# Patient Record
Sex: MALE | Race: WHITE | NOT HISPANIC OR LATINO | URBAN - METROPOLITAN AREA
[De-identification: names, ages, dates, MRNs, and addresses within clinical notes are randomized per-mention and may not be internally consistent; named-entity substitution may affect disease eponyms.]

---

## 2019-09-30 ENCOUNTER — OUTPATIENT (OUTPATIENT)
Dept: OUTPATIENT SERVICES | Facility: HOSPITAL | Age: 69
LOS: 1 days | End: 2019-09-30
Payer: MEDICARE

## 2019-09-30 ENCOUNTER — APPOINTMENT (OUTPATIENT)
Dept: RADIOLOGY | Facility: HOSPITAL | Age: 69
End: 2019-09-30
Payer: COMMERCIAL

## 2019-09-30 DIAGNOSIS — Z00.8 ENCOUNTER FOR OTHER GENERAL EXAMINATION: ICD-10-CM

## 2019-09-30 PROCEDURE — 73562 X-RAY EXAM OF KNEE 3: CPT | Mod: 26,LT

## 2019-09-30 PROCEDURE — 73562 X-RAY EXAM OF KNEE 3: CPT

## 2021-03-24 ENCOUNTER — INPATIENT (INPATIENT)
Facility: HOSPITAL | Age: 71
LOS: 8 days | Discharge: HOME | DRG: 177 | End: 2021-04-02
Attending: FAMILY MEDICINE | Admitting: INTERNAL MEDICINE
Payer: MEDICARE

## 2021-03-24 VITALS
HEIGHT: 69 IN | OXYGEN SATURATION: 95 % | TEMPERATURE: 98 F | RESPIRATION RATE: 16 BRPM | DIASTOLIC BLOOD PRESSURE: 73 MMHG | SYSTOLIC BLOOD PRESSURE: 186 MMHG | HEART RATE: 89 BPM | WEIGHT: 315 LBS

## 2021-03-24 DIAGNOSIS — R09.02 HYPOXEMIA: ICD-10-CM

## 2021-03-24 DIAGNOSIS — Z95.0 PRESENCE OF CARDIAC PACEMAKER: Chronic | ICD-10-CM

## 2021-03-24 LAB
ALBUMIN SERPL ELPH-MCNC: 2.5 G/DL — LOW (ref 3.3–5)
ALBUMIN SERPL ELPH-MCNC: 2.5 G/DL — LOW (ref 3.3–5)
ALP SERPL-CCNC: 83 U/L — SIGNIFICANT CHANGE UP (ref 40–120)
ALP SERPL-CCNC: 83 U/L — SIGNIFICANT CHANGE UP (ref 40–120)
ALT FLD-CCNC: 75 U/L — HIGH (ref 10–45)
ALT FLD-CCNC: 75 U/L — HIGH (ref 10–45)
ANION GAP SERPL CALC-SCNC: 7 MMOL/L — SIGNIFICANT CHANGE UP (ref 5–17)
AST SERPL-CCNC: 50 U/L — HIGH (ref 10–40)
AST SERPL-CCNC: 50 U/L — HIGH (ref 10–40)
BASOPHILS # BLD AUTO: 0.01 K/UL — SIGNIFICANT CHANGE UP (ref 0–0.2)
BASOPHILS NFR BLD AUTO: 0.1 % — SIGNIFICANT CHANGE UP (ref 0–2)
BILIRUB DIRECT SERPL-MCNC: 0.1 MG/DL — SIGNIFICANT CHANGE UP (ref 0–0.2)
BILIRUB INDIRECT FLD-MCNC: 0.2 MG/DL — SIGNIFICANT CHANGE UP (ref 0.2–1)
BILIRUB SERPL-MCNC: 0.3 MG/DL — SIGNIFICANT CHANGE UP (ref 0.2–1.2)
BILIRUB SERPL-MCNC: 0.3 MG/DL — SIGNIFICANT CHANGE UP (ref 0.2–1.2)
BUN SERPL-MCNC: 19 MG/DL — SIGNIFICANT CHANGE UP (ref 7–23)
CALCIUM SERPL-MCNC: 8 MG/DL — LOW (ref 8.4–10.5)
CHLORIDE SERPL-SCNC: 101 MMOL/L — SIGNIFICANT CHANGE UP (ref 96–108)
CO2 SERPL-SCNC: 27 MMOL/L — SIGNIFICANT CHANGE UP (ref 22–31)
CREAT SERPL-MCNC: 1.08 MG/DL — SIGNIFICANT CHANGE UP (ref 0.5–1.3)
CREAT SERPL-MCNC: 1.08 MG/DL — SIGNIFICANT CHANGE UP (ref 0.5–1.3)
CRP SERPL-MCNC: 54 MG/L — HIGH
D DIMER BLD IA.RAPID-MCNC: 298 NG/ML DDU — HIGH
EOSINOPHIL # BLD AUTO: 0.12 K/UL — SIGNIFICANT CHANGE UP (ref 0–0.5)
EOSINOPHIL NFR BLD AUTO: 1.6 % — SIGNIFICANT CHANGE UP (ref 0–6)
GLUCOSE BLDC GLUCOMTR-MCNC: 147 MG/DL — HIGH (ref 70–99)
GLUCOSE BLDC GLUCOMTR-MCNC: 169 MG/DL — HIGH (ref 70–99)
GLUCOSE SERPL-MCNC: 110 MG/DL — HIGH (ref 70–99)
HCT VFR BLD CALC: 40 % — SIGNIFICANT CHANGE UP (ref 39–50)
HGB BLD-MCNC: 13.2 G/DL — SIGNIFICANT CHANGE UP (ref 13–17)
IMM GRANULOCYTES NFR BLD AUTO: 0.4 % — SIGNIFICANT CHANGE UP (ref 0–1.5)
INR BLD: 1.18 RATIO — HIGH (ref 0.88–1.16)
LYMPHOCYTES # BLD AUTO: 0.85 K/UL — LOW (ref 1–3.3)
LYMPHOCYTES # BLD AUTO: 11.1 % — LOW (ref 13–44)
MCHC RBC-ENTMCNC: 29.7 PG — SIGNIFICANT CHANGE UP (ref 27–34)
MCHC RBC-ENTMCNC: 33 GM/DL — SIGNIFICANT CHANGE UP (ref 32–36)
MCV RBC AUTO: 89.9 FL — SIGNIFICANT CHANGE UP (ref 80–100)
MONOCYTES # BLD AUTO: 0.92 K/UL — HIGH (ref 0–0.9)
MONOCYTES NFR BLD AUTO: 12 % — SIGNIFICANT CHANGE UP (ref 2–14)
NEUTROPHILS # BLD AUTO: 5.73 K/UL — SIGNIFICANT CHANGE UP (ref 1.8–7.4)
NEUTROPHILS NFR BLD AUTO: 74.8 % — SIGNIFICANT CHANGE UP (ref 43–77)
NRBC # BLD: 0 /100 WBCS — SIGNIFICANT CHANGE UP (ref 0–0)
PLATELET # BLD AUTO: 205 K/UL — SIGNIFICANT CHANGE UP (ref 150–400)
POTASSIUM SERPL-MCNC: 3.6 MMOL/L — SIGNIFICANT CHANGE UP (ref 3.5–5.3)
POTASSIUM SERPL-SCNC: 3.6 MMOL/L — SIGNIFICANT CHANGE UP (ref 3.5–5.3)
PROCALCITONIN SERPL-MCNC: 0.11 NG/ML — HIGH
PROT SERPL-MCNC: 6.6 G/DL — SIGNIFICANT CHANGE UP (ref 6–8.3)
PROT SERPL-MCNC: 6.6 G/DL — SIGNIFICANT CHANGE UP (ref 6–8.3)
PROTHROM AB SERPL-ACNC: 14.2 SEC — HIGH (ref 10.6–13.6)
RBC # BLD: 4.45 M/UL — SIGNIFICANT CHANGE UP (ref 4.2–5.8)
RBC # FLD: 13.8 % — SIGNIFICANT CHANGE UP (ref 10.3–14.5)
SARS-COV-2 RNA SPEC QL NAA+PROBE: DETECTED
SODIUM SERPL-SCNC: 135 MMOL/L — SIGNIFICANT CHANGE UP (ref 135–145)
WBC # BLD: 7.66 K/UL — SIGNIFICANT CHANGE UP (ref 3.8–10.5)
WBC # FLD AUTO: 7.66 K/UL — SIGNIFICANT CHANGE UP (ref 3.8–10.5)

## 2021-03-24 PROCEDURE — 99223 1ST HOSP IP/OBS HIGH 75: CPT | Mod: CS

## 2021-03-24 PROCEDURE — 71045 X-RAY EXAM CHEST 1 VIEW: CPT | Mod: 26

## 2021-03-24 PROCEDURE — 93010 ELECTROCARDIOGRAM REPORT: CPT

## 2021-03-24 PROCEDURE — 99285 EMERGENCY DEPT VISIT HI MDM: CPT | Mod: CS

## 2021-03-24 RX ORDER — SODIUM CHLORIDE 9 MG/ML
1000 INJECTION, SOLUTION INTRAVENOUS
Refills: 0 | Status: DISCONTINUED | OUTPATIENT
Start: 2021-03-24 | End: 2021-04-02

## 2021-03-24 RX ORDER — GLUCAGON INJECTION, SOLUTION 0.5 MG/.1ML
1 INJECTION, SOLUTION SUBCUTANEOUS ONCE
Refills: 0 | Status: DISCONTINUED | OUTPATIENT
Start: 2021-03-24 | End: 2021-04-02

## 2021-03-24 RX ORDER — ENOXAPARIN SODIUM 100 MG/ML
40 INJECTION SUBCUTANEOUS EVERY 12 HOURS
Refills: 0 | Status: DISCONTINUED | OUTPATIENT
Start: 2021-03-24 | End: 2021-03-28

## 2021-03-24 RX ORDER — CHOLECALCIFEROL (VITAMIN D3) 125 MCG
4000 CAPSULE ORAL DAILY
Refills: 0 | Status: DISCONTINUED | OUTPATIENT
Start: 2021-03-24 | End: 2021-04-02

## 2021-03-24 RX ORDER — POLYETHYLENE GLYCOL 3350 17 G/17G
17 POWDER, FOR SOLUTION ORAL DAILY
Refills: 0 | Status: DISCONTINUED | OUTPATIENT
Start: 2021-03-24 | End: 2021-04-02

## 2021-03-24 RX ORDER — AZITHROMYCIN 500 MG/1
500 TABLET, FILM COATED ORAL ONCE
Refills: 0 | Status: COMPLETED | OUTPATIENT
Start: 2021-03-24 | End: 2021-03-24

## 2021-03-24 RX ORDER — ZINC SULFATE TAB 220 MG (50 MG ZINC EQUIVALENT) 220 (50 ZN) MG
220 TAB ORAL DAILY
Refills: 0 | Status: DISCONTINUED | OUTPATIENT
Start: 2021-03-24 | End: 2021-04-02

## 2021-03-24 RX ORDER — INSULIN LISPRO 100/ML
VIAL (ML) SUBCUTANEOUS
Refills: 0 | Status: DISCONTINUED | OUTPATIENT
Start: 2021-03-24 | End: 2021-03-28

## 2021-03-24 RX ORDER — ASCORBIC ACID 60 MG
500 TABLET,CHEWABLE ORAL DAILY
Refills: 0 | Status: DISCONTINUED | OUTPATIENT
Start: 2021-03-24 | End: 2021-04-02

## 2021-03-24 RX ORDER — SENNA PLUS 8.6 MG/1
2 TABLET ORAL AT BEDTIME
Refills: 0 | Status: DISCONTINUED | OUTPATIENT
Start: 2021-03-24 | End: 2021-04-02

## 2021-03-24 RX ORDER — DEXAMETHASONE 0.5 MG/5ML
10 ELIXIR ORAL ONCE
Refills: 0 | Status: COMPLETED | OUTPATIENT
Start: 2021-03-24 | End: 2021-03-24

## 2021-03-24 RX ORDER — DEXTROSE 50 % IN WATER 50 %
12.5 SYRINGE (ML) INTRAVENOUS ONCE
Refills: 0 | Status: DISCONTINUED | OUTPATIENT
Start: 2021-03-24 | End: 2021-04-02

## 2021-03-24 RX ORDER — DEXTROSE 50 % IN WATER 50 %
25 SYRINGE (ML) INTRAVENOUS ONCE
Refills: 0 | Status: DISCONTINUED | OUTPATIENT
Start: 2021-03-24 | End: 2021-04-02

## 2021-03-24 RX ORDER — CEFTRIAXONE 500 MG/1
1000 INJECTION, POWDER, FOR SOLUTION INTRAMUSCULAR; INTRAVENOUS ONCE
Refills: 0 | Status: COMPLETED | OUTPATIENT
Start: 2021-03-24 | End: 2021-03-24

## 2021-03-24 RX ORDER — FAMOTIDINE 10 MG/ML
20 INJECTION INTRAVENOUS DAILY
Refills: 0 | Status: DISCONTINUED | OUTPATIENT
Start: 2021-03-24 | End: 2021-04-02

## 2021-03-24 RX ORDER — DEXTROSE 50 % IN WATER 50 %
15 SYRINGE (ML) INTRAVENOUS ONCE
Refills: 0 | Status: DISCONTINUED | OUTPATIENT
Start: 2021-03-24 | End: 2021-04-02

## 2021-03-24 RX ORDER — DEXAMETHASONE 0.5 MG/5ML
6 ELIXIR ORAL DAILY
Refills: 0 | Status: DISCONTINUED | OUTPATIENT
Start: 2021-03-24 | End: 2021-04-02

## 2021-03-24 RX ADMIN — Medication 1 TABLET(S): at 17:02

## 2021-03-24 RX ADMIN — SENNA PLUS 2 TABLET(S): 8.6 TABLET ORAL at 21:53

## 2021-03-24 RX ADMIN — ENOXAPARIN SODIUM 40 MILLIGRAM(S): 100 INJECTION SUBCUTANEOUS at 17:01

## 2021-03-24 RX ADMIN — Medication 4000 UNIT(S): at 17:04

## 2021-03-24 RX ADMIN — Medication 102 MILLIGRAM(S): at 12:13

## 2021-03-24 RX ADMIN — Medication 6 MILLIGRAM(S): at 17:02

## 2021-03-24 RX ADMIN — POLYETHYLENE GLYCOL 3350 17 GRAM(S): 17 POWDER, FOR SOLUTION ORAL at 17:01

## 2021-03-24 RX ADMIN — CEFTRIAXONE 1000 MILLIGRAM(S): 500 INJECTION, POWDER, FOR SOLUTION INTRAMUSCULAR; INTRAVENOUS at 12:22

## 2021-03-24 RX ADMIN — AZITHROMYCIN 255 MILLIGRAM(S): 500 TABLET, FILM COATED ORAL at 12:22

## 2021-03-24 RX ADMIN — AZITHROMYCIN 500 MILLIGRAM(S): 500 TABLET, FILM COATED ORAL at 13:20

## 2021-03-24 RX ADMIN — Medication 10 MILLIGRAM(S): at 12:22

## 2021-03-24 RX ADMIN — Medication 500 MILLIGRAM(S): at 17:02

## 2021-03-24 RX ADMIN — FAMOTIDINE 20 MILLIGRAM(S): 10 INJECTION INTRAVENOUS at 17:01

## 2021-03-24 RX ADMIN — ZINC SULFATE TAB 220 MG (50 MG ZINC EQUIVALENT) 220 MILLIGRAM(S): 220 (50 ZN) TAB at 17:01

## 2021-03-24 RX ADMIN — CEFTRIAXONE 100 MILLIGRAM(S): 500 INJECTION, POWDER, FOR SOLUTION INTRAMUSCULAR; INTRAVENOUS at 12:14

## 2021-03-24 NOTE — ED PROVIDER NOTE - ATTENDING CONTRIBUTION TO CARE
52 yr old male with no pmhx presents for covid swab s/p recent travel from [place]. Pt reports needing swab to return to work. Denies any current symptoms. No fever, chills, chest pain, cough, sob or any other symptoms.    well appearing, unremarkable exam   covid swab and stable for dc  Dr. Quinones:  I have reviewed and discussed with the PA/ resident the case specifics, including the history, physical assessment, evaluation, conclusion, laboratory results, and medical plan. I agree with the contents, and conclusions. I have personally examined, and interviewed the patient.

## 2021-03-24 NOTE — PATIENT PROFILE ADULT - NSTOBACCONEVERSMOKERY/N_GEN_A
"This worker again spoke to [facility]'s pharmacy, whose pharmacy tech stated that he had recently received two separate prescriptions of phenobarbital--  one from Dr. Sol, and one from patient's \"restricted provider\" at   [facility].   This technician stated that he would reach out to provider at [facility] to see if that prescription was still valid, or if it was to be discontinued in consideration pf Dr. Sol's discharge medication order.  Awaiting return call.  " No

## 2021-03-24 NOTE — H&P ADULT - NSHPPHYSICALEXAM_GEN_ALL_CORE
Vital Signs Last 24 Hrs  T(C): 36.9 (24 Mar 2021 10:37), Max: 36.9 (24 Mar 2021 10:37)  T(F): 98.4 (24 Mar 2021 10:37), Max: 98.4 (24 Mar 2021 10:37)  HR: 81 (24 Mar 2021 12:22) (81 - 89)  BP: 132/94 (24 Mar 2021 12:22) (132/94 - 186/73)  BP(mean): 101 (24 Mar 2021 12:22) (101 - 101)  RR: 19 (24 Mar 2021 12:22) (16 - 19)  SpO2: 97% (24 Mar 2021 12:22) (95% - 97%) Vital Signs Last 24 Hrs  T(C): 36.9 (24 Mar 2021 10:37), Max: 36.9 (24 Mar 2021 10:37)  T(F): 98.4 (24 Mar 2021 10:37), Max: 98.4 (24 Mar 2021 10:37)  HR: 81 (24 Mar 2021 12:22) (81 - 89)  BP: 132/94 (24 Mar 2021 12:22) (132/94 - 186/73)  BP(mean): 101 (24 Mar 2021 12:22) (101 - 101)  RR: 19 (24 Mar 2021 12:22) (16 - 19)  SpO2: 97% (24 Mar 2021 12:22) (95% - 97%)    GENERAL: Not in distress. Alert . morbid obesity  HEENT:  Normocephalic and atraumatic. PEARLA,EOMI  NECK: Supple.  No JVD.    CARDIOVASCULAR: RRR S1, S2. No murmur/rubs/gallop  LUNGS: BLAE+, no rales, no wheezing, no rhonchi.    ABDOMEN: ND. Soft,  NT, no guarding / rebound / rigidity. BS normoactive. No CVA tenderness.    EXTREMITIES: no cyanosis, no clubbing, no edema. no leg or calf TP  SKIN: no rash.   NEUROLOGIC: AAO*3.strength is symmetric, sensation intact, speech fluent.    PSYCHIATRIC: Calm.  No agitation.

## 2021-03-24 NOTE — ED PROVIDER NOTE - OBJECTIVE STATEMENT
70 yr old male with pacemaker, positive covid, dx 1 week ago, presents today with cough, and shortness of breath for the last 2 days, worse at night and unable to sleep. Pt reports seen by pmd last week and was given prednisone, zpack and inhaler which he has completed. Laure any current fever, chills, n/v/d. Pt reports he has been taking 02 at home which has been in mid 90's

## 2021-03-24 NOTE — H&P ADULT - HISTORY OF PRESENT ILLNESS
cough and sob , tested positive for covid on Wednesday. found to have covid pneumonia hypoxia.  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 He along with his entire family was covid positive. he tested postive 1 week ago. Pt reports seen by pmd last week and was given prednisone, zpack, tensilon catrachito and inhaler which he has completed. but did not help. His O2 sat went down to 89% yesterday then mid 90s.  In ER his VS is table. O2 sat was 95% at RA. CXR showed b/l PNA. wbc normal. procal 0.11. LFT and Dddimer elevated. covid +. One doses each: dexamethasone, Ctx and zithromax was given and medical admission was called.  patient currently reported constipation, cough and mild SOB.

## 2021-03-24 NOTE — H&P ADULT - NSHPLABSRESULTS_GEN_ALL_CORE
13.2   7.66  )-----------( 205      ( 24 Mar 2021 11:32 )             40.0       03-24    135  |  101  |  19  ----------------------------<  110<H>  3.6   |  27  |  1.08    Ca    8.0<L>      24 Mar 2021 11:32    TPro  6.6  /  Alb  2.5<L>  /  TBili  0.3  /  DBili  x   /  AST  50<H>  /  ALT  75<H>  /  AlkPhos  83  03-24    < from: Xray Chest 1 View AP/PA (03.24.21 @ 10:59) >    There is mild new scattered interstitial infiltrate consistent with pneumonia or pneumonitis without segmental consolidation, layering effusion or cavitation. The heart is normal in size. A pacemaker remains in place.    < end of copied text >

## 2021-03-24 NOTE — PATIENT PROFILE ADULT - NSPROPTRIGHTNOTIFY_GEN_A_NUR
[Clinical] : TNM Stage: c [IB] : IB [TTNM] : 1b [NTNM] : 0 [MTNM] : 0 [de-identified] : 5250cGy [de-identified] : 7330cGy [de-identified] : Cervix declines

## 2021-03-24 NOTE — ED ADULT NURSE NOTE - NS ED NOTE ABUSE SUSPICION NEGLECT YN
Problem: Adult Inpatient Plan of Care  Goal: Plan of Care Review  Outcome: Ongoing (interventions implemented as appropriate)  Pt free of fall this shift. Pain assessed and pt c/o on BLE; prn norco administered and pt verbalized moderate relief. Cbg monitored to manage pt DM2 and no insulin given this shift. Cardiac monitor in place and no abnormal rhythm noted. No s/s of infection noted. Pt aaox4. Afebrile. Vss. Pt is getting discharge.       No

## 2021-03-24 NOTE — ED PROVIDER NOTE - CLINICAL SUMMARY MEDICAL DECISION MAKING FREE TEXT BOX
70 yr old male with pacemaker, positive covid, dx 1 week ago, presents today with cough, and shortness of breath for the last 2 days, worse at night and unable to sleep. Pt reports seen by pmd last week and was given prednisone, zpack and inhaler which he has completed. Laure any current fever, chills, n/v/d. Pt reports he has been taking 02 at home which has been in mid 90's   diffuse decrease lung sounds, 02 dropping to 89 on RA, covid labs sent, plan to admit to medicine for hypoxia

## 2021-03-24 NOTE — H&P ADULT - NSHPREVIEWOFSYSTEMS_GEN_ALL_CORE
no fever/chills/CP//palpitation/dizziness/ abd pain/nausea/vomiting/diarrhea/constipation/headaches. all other ROS neg

## 2021-03-24 NOTE — ED ADULT NURSE NOTE - OBJECTIVE STATEMENT
Pt presents to ED from home for SOB and cough. Pt was diagnosed with covid last Wednesday and has felt SOB x3 days worsening with activity. Denies fever. Yesterday he finished prednisone and azithromycin treatment prescribed by his PMD.

## 2021-03-24 NOTE — ED PROVIDER NOTE - CARE PLAN
Principal Discharge DX:	Hypoxia  Secondary Diagnosis:	COVID-19   Principal Discharge DX:	Hypoxia  Secondary Diagnosis:	COVID-19  Secondary Diagnosis:	Pneumonia

## 2021-03-25 LAB
A1C WITH ESTIMATED AVERAGE GLUCOSE RESULT: 6.4 % — HIGH (ref 4–5.6)
ALBUMIN SERPL ELPH-MCNC: 2.5 G/DL — LOW (ref 3.3–5)
ALBUMIN SERPL ELPH-MCNC: 2.5 G/DL — LOW (ref 3.3–5)
ALP SERPL-CCNC: 86 U/L — SIGNIFICANT CHANGE UP (ref 40–120)
ALP SERPL-CCNC: 86 U/L — SIGNIFICANT CHANGE UP (ref 40–120)
ALT FLD-CCNC: 75 U/L — HIGH (ref 10–45)
ALT FLD-CCNC: 75 U/L — HIGH (ref 10–45)
ANION GAP SERPL CALC-SCNC: 8 MMOL/L — SIGNIFICANT CHANGE UP (ref 5–17)
AST SERPL-CCNC: 43 U/L — HIGH (ref 10–40)
AST SERPL-CCNC: 43 U/L — HIGH (ref 10–40)
BASOPHILS # BLD AUTO: 0.01 K/UL — SIGNIFICANT CHANGE UP (ref 0–0.2)
BASOPHILS NFR BLD AUTO: 0.3 % — SIGNIFICANT CHANGE UP (ref 0–2)
BILIRUB DIRECT SERPL-MCNC: 0.1 MG/DL — SIGNIFICANT CHANGE UP (ref 0–0.2)
BILIRUB INDIRECT FLD-MCNC: 0.1 MG/DL — LOW (ref 0.2–1)
BILIRUB SERPL-MCNC: 0.2 MG/DL — SIGNIFICANT CHANGE UP (ref 0.2–1.2)
BILIRUB SERPL-MCNC: 0.2 MG/DL — SIGNIFICANT CHANGE UP (ref 0.2–1.2)
BUN SERPL-MCNC: 19 MG/DL — SIGNIFICANT CHANGE UP (ref 7–23)
CALCIUM SERPL-MCNC: 8.9 MG/DL — SIGNIFICANT CHANGE UP (ref 8.4–10.5)
CHLORIDE SERPL-SCNC: 103 MMOL/L — SIGNIFICANT CHANGE UP (ref 96–108)
CO2 SERPL-SCNC: 29 MMOL/L — SIGNIFICANT CHANGE UP (ref 22–31)
COVID-19 SPIKE DOMAIN AB INTERP: NEGATIVE — SIGNIFICANT CHANGE UP
COVID-19 SPIKE DOMAIN ANTIBODY RESULT: 0.4 U/ML — SIGNIFICANT CHANGE UP
CREAT SERPL-MCNC: 1.05 MG/DL — SIGNIFICANT CHANGE UP (ref 0.5–1.3)
CREAT SERPL-MCNC: 1.05 MG/DL — SIGNIFICANT CHANGE UP (ref 0.5–1.3)
CRP SERPL-MCNC: 67 MG/L — HIGH
EOSINOPHIL # BLD AUTO: 0 K/UL — SIGNIFICANT CHANGE UP (ref 0–0.5)
EOSINOPHIL NFR BLD AUTO: 0 % — SIGNIFICANT CHANGE UP (ref 0–6)
ESTIMATED AVERAGE GLUCOSE: 137 MG/DL — HIGH (ref 68–114)
FERRITIN SERPL-MCNC: 1116 NG/ML — HIGH (ref 30–400)
FERRITIN SERPL-MCNC: 937 NG/ML — HIGH (ref 30–400)
GLUCOSE BLDC GLUCOMTR-MCNC: 129 MG/DL — HIGH (ref 70–99)
GLUCOSE BLDC GLUCOMTR-MCNC: 131 MG/DL — HIGH (ref 70–99)
GLUCOSE BLDC GLUCOMTR-MCNC: 140 MG/DL — HIGH (ref 70–99)
GLUCOSE BLDC GLUCOMTR-MCNC: 154 MG/DL — HIGH (ref 70–99)
GLUCOSE SERPL-MCNC: 146 MG/DL — HIGH (ref 70–99)
HCT VFR BLD CALC: 42.5 % — SIGNIFICANT CHANGE UP (ref 39–50)
HCV AB S/CO SERPL IA: 0.08 S/CO — SIGNIFICANT CHANGE UP (ref 0–0.99)
HCV AB SERPL-IMP: SIGNIFICANT CHANGE UP
HGB BLD-MCNC: 13.8 G/DL — SIGNIFICANT CHANGE UP (ref 13–17)
IMM GRANULOCYTES NFR BLD AUTO: 0.8 % — SIGNIFICANT CHANGE UP (ref 0–1.5)
INR BLD: 1.14 RATIO — SIGNIFICANT CHANGE UP (ref 0.88–1.16)
LDH SERPL L TO P-CCNC: 491 U/L — HIGH (ref 50–242)
LYMPHOCYTES # BLD AUTO: 0.45 K/UL — LOW (ref 1–3.3)
LYMPHOCYTES # BLD AUTO: 11.4 % — LOW (ref 13–44)
MAGNESIUM SERPL-MCNC: 2.2 MG/DL — SIGNIFICANT CHANGE UP (ref 1.6–2.6)
MCHC RBC-ENTMCNC: 28.9 PG — SIGNIFICANT CHANGE UP (ref 27–34)
MCHC RBC-ENTMCNC: 32.5 GM/DL — SIGNIFICANT CHANGE UP (ref 32–36)
MCV RBC AUTO: 89.1 FL — SIGNIFICANT CHANGE UP (ref 80–100)
MONOCYTES # BLD AUTO: 0.63 K/UL — SIGNIFICANT CHANGE UP (ref 0–0.9)
MONOCYTES NFR BLD AUTO: 15.9 % — HIGH (ref 2–14)
NEUTROPHILS # BLD AUTO: 2.84 K/UL — SIGNIFICANT CHANGE UP (ref 1.8–7.4)
NEUTROPHILS NFR BLD AUTO: 71.6 % — SIGNIFICANT CHANGE UP (ref 43–77)
NRBC # BLD: 0 /100 WBCS — SIGNIFICANT CHANGE UP (ref 0–0)
NT-PROBNP SERPL-SCNC: 794 PG/ML — HIGH (ref 0–300)
PLATELET # BLD AUTO: 210 K/UL — SIGNIFICANT CHANGE UP (ref 150–400)
POTASSIUM SERPL-MCNC: 4.5 MMOL/L — SIGNIFICANT CHANGE UP (ref 3.5–5.3)
POTASSIUM SERPL-SCNC: 4.5 MMOL/L — SIGNIFICANT CHANGE UP (ref 3.5–5.3)
PROCALCITONIN SERPL-MCNC: 0.09 NG/ML — HIGH
PROT SERPL-MCNC: 7.3 G/DL — SIGNIFICANT CHANGE UP (ref 6–8.3)
PROT SERPL-MCNC: 7.3 G/DL — SIGNIFICANT CHANGE UP (ref 6–8.3)
PROTHROM AB SERPL-ACNC: 13.7 SEC — HIGH (ref 10.6–13.6)
RBC # BLD: 4.77 M/UL — SIGNIFICANT CHANGE UP (ref 4.2–5.8)
RBC # FLD: 14 % — SIGNIFICANT CHANGE UP (ref 10.3–14.5)
SARS-COV-2 IGG+IGM SERPL QL IA: 0.4 U/ML — SIGNIFICANT CHANGE UP
SARS-COV-2 IGG+IGM SERPL QL IA: NEGATIVE — SIGNIFICANT CHANGE UP
SODIUM SERPL-SCNC: 140 MMOL/L — SIGNIFICANT CHANGE UP (ref 135–145)
TROPONIN I SERPL-MCNC: 0.02 NG/ML — SIGNIFICANT CHANGE UP (ref 0.02–0.06)
WBC # BLD: 3.96 K/UL — SIGNIFICANT CHANGE UP (ref 3.8–10.5)
WBC # FLD AUTO: 3.96 K/UL — SIGNIFICANT CHANGE UP (ref 3.8–10.5)

## 2021-03-25 PROCEDURE — 99222 1ST HOSP IP/OBS MODERATE 55: CPT

## 2021-03-25 RX ORDER — REMDESIVIR 5 MG/ML
INJECTION INTRAVENOUS
Refills: 0 | Status: COMPLETED | OUTPATIENT
Start: 2021-03-25 | End: 2021-03-29

## 2021-03-25 RX ORDER — REMDESIVIR 5 MG/ML
200 INJECTION INTRAVENOUS EVERY 24 HOURS
Refills: 0 | Status: COMPLETED | OUTPATIENT
Start: 2021-03-25 | End: 2021-03-25

## 2021-03-25 RX ORDER — REMDESIVIR 5 MG/ML
100 INJECTION INTRAVENOUS EVERY 24 HOURS
Refills: 0 | Status: COMPLETED | OUTPATIENT
Start: 2021-03-26 | End: 2021-03-29

## 2021-03-25 RX ADMIN — ENOXAPARIN SODIUM 40 MILLIGRAM(S): 100 INJECTION SUBCUTANEOUS at 17:09

## 2021-03-25 RX ADMIN — Medication 500 MILLIGRAM(S): at 11:54

## 2021-03-25 RX ADMIN — POLYETHYLENE GLYCOL 3350 17 GRAM(S): 17 POWDER, FOR SOLUTION ORAL at 11:54

## 2021-03-25 RX ADMIN — REMDESIVIR 200 MILLIGRAM(S): 5 INJECTION INTRAVENOUS at 08:19

## 2021-03-25 RX ADMIN — Medication 4000 UNIT(S): at 11:54

## 2021-03-25 RX ADMIN — Medication 2: at 12:34

## 2021-03-25 RX ADMIN — ENOXAPARIN SODIUM 40 MILLIGRAM(S): 100 INJECTION SUBCUTANEOUS at 06:07

## 2021-03-25 RX ADMIN — FAMOTIDINE 20 MILLIGRAM(S): 10 INJECTION INTRAVENOUS at 11:54

## 2021-03-25 RX ADMIN — Medication 1 TABLET(S): at 11:54

## 2021-03-25 RX ADMIN — Medication 6 MILLIGRAM(S): at 07:07

## 2021-03-25 RX ADMIN — ZINC SULFATE TAB 220 MG (50 MG ZINC EQUIVALENT) 220 MILLIGRAM(S): 220 (50 ZN) TAB at 11:54

## 2021-03-25 NOTE — CHART NOTE - NSCHARTNOTEFT_GEN_A_CORE
Reviewed prior progress notes, labs and imaging.    70 yr old male with h/o bradycardia s/p pacemaker, not on any meds, p/w cough, and shortness of breath for the last 2 days. Patient and family tested positive for COVID 1 week ago, he was seen by PMD outpatient last week and was given prednisone, zpack, tessalon pearles and inhaler which did not help. His O2 sat went down to 89% yesterday then mid 90s.  In ER his VS were stable. O2 sat was 95% at RA. CXR showed b/l PNA. WBC normal. procal 0.11. LFT and D-dimer elevated. He was treated with one doses of dexamethasone, ceftriaxone and azithromycin     #Pneumonia due to COVID 19  - Supplemental O2 PRN, currently on 2L nasal cannula  - Remdesivir day 1/5  - Decadron day 2/10  - MVT, Vit c, Vit D, Zinc  - check biomarkers  - incentive spirometry    #Wide complex tachycardia, resolved  - Appreciate cardiology recs  - Monitor tele    #Elevated ddimer  #Transaminitis  - Likely due to covid  - Monitor    #Elevated BP  - no h/o HTN  - DASH diet  - anti-HTN if persistently elevated    #Constipation  - senna, miralax  - encouraged PO intake    #H/o bradycardia s/p PPM  - monitor    #Morbid obesity  - weight loss    DVT-P: lovenox BID            Primary Diagnosis:      Plan:        Anticipated Discharge:

## 2021-03-25 NOTE — PROGRESS NOTE ADULT - SUBJECTIVE AND OBJECTIVE BOX
Patient is a 70y old  Male who presents with a chief complaint of covid PNA, hypoxia (24 Mar 2021 12:56)      Patient seen and examined at bedside. + wheezing. pt reports some coughing but SOB is getting better. walking to bathroom and only has mild dyspnea.     ALLERGIES:  No Known Allergies    MEDICATIONS  (STANDING):  ascorbic acid 500 milliGRAM(s) Oral daily  cholecalciferol 4000 Unit(s) Oral daily  dexAMETHasone     Tablet 6 milliGRAM(s) Oral daily  dextrose 40% Gel 15 Gram(s) Oral once  dextrose 5%. 1000 milliLiter(s) (50 mL/Hr) IV Continuous <Continuous>  dextrose 5%. 1000 milliLiter(s) (100 mL/Hr) IV Continuous <Continuous>  dextrose 50% Injectable 25 Gram(s) IV Push once  dextrose 50% Injectable 12.5 Gram(s) IV Push once  dextrose 50% Injectable 25 Gram(s) IV Push once  enoxaparin Injectable 40 milliGRAM(s) SubCutaneous every 12 hours  famotidine    Tablet 20 milliGRAM(s) Oral daily  glucagon  Injectable 1 milliGRAM(s) IntraMuscular once  insulin lispro (ADMELOG) corrective regimen sliding scale   SubCutaneous three times a day before meals  multivitamin 1 Tablet(s) Oral daily  polyethylene glycol 3350 17 Gram(s) Oral daily  remdesivir  IVPB   IV Intermittent   senna 2 Tablet(s) Oral at bedtime  zinc sulfate 220 milliGRAM(s) Oral daily    MEDICATIONS  (PRN):    Vital Signs Last 24 Hrs  T(F): 97.6 (25 Mar 2021 09:08), Max: 98.4 (24 Mar 2021 10:37)  HR: 70 (25 Mar 2021 09:08) (70 - 89)  BP: 156/89 (25 Mar 2021 09:08) (126/68 - 186/73)  RR: 20 (25 Mar 2021 09:08) (16 - 23)  SpO2: 98% (25 Mar 2021 09:08) (95% - 100%)  I&O's Summary    PHYSICAL EXAM:  General: NAD, A/O x 3  ENT: MMM  Neck: Supple, No JVD  Lungs: diffuse expiratory wheezing. no crackles no rhonchi  Cardio: RRR, S1/S2, No murmurs  Abdomen: Soft, Nontender, Nondistended; Bowel sounds present  Extremities: No calf tenderness, 1-2+ pitting edema     LABS:                        13.8   3.96  )-----------( 210      ( 25 Mar 2021 06:15 )             42.5     03-25    140  |  103  |  19  ----------------------------<  146  4.5   |  29  |  1.05    Ca    8.9      25 Mar 2021 06:15    TPro  7.3  /  Alb  2.5  /  TBili  0.2  /  DBili  x   /  AST  43  /  ALT  75  /  AlkPhos  86  03-25    eGFR if Non African American: 72 mL/min/1.73M2 (03-25-21 @ 06:15)  eGFR if African American: 84 mL/min/1.73M2 (03-25-21 @ 06:15)    PT/INR - ( 25 Mar 2021 05:00 )   PT: 13.7 sec;   INR: 1.14 ratio                               POCT Blood Glucose.: 129 mg/dL (25 Mar 2021 08:43)  POCT Blood Glucose.: 169 mg/dL (24 Mar 2021 21:01)  POCT Blood Glucose.: 147 mg/dL (24 Mar 2021 16:58)            RADIOLOGY & ADDITIONAL TESTS:    Care Discussed with Consultants/Other Providers:

## 2021-03-25 NOTE — CHART NOTE - NSCHARTNOTEFT_GEN_A_CORE
Nutrition Initial Assessment    Nutrition Consult Received: Yes [   ]  No [ X  ]    Reason for Initial Nutrition Assessment: BMI > 40    Source of Information: Unable to conduct a fact to face interview due to limited contact restrictions related to pt's medical condition and isolation precautions. Information obtained from a phone call with the pt and from the EMR.    Admitting Diagnosis: 70y Male admitted for Hypoxia      PAST MEDICAL & SURGICAL HISTORY:  Bradycardia    Pacemaker    Pacemaker        Subjective Information:  Unable to reach pt. for interview. ate well this am as reported by RN. tolerates dash diet. no edema noted. skin intact. no n/v/ diarrhea noted.  pt. is obese. weight hx. unknown.      GI Issues: none    Current Nutrition Order: DASH  PO Intake:   Good (%) [ X  ]    Fair (50-75%) [   ]    Poor (<50%) [   ]    Skin Integrity: WDL    Labs:   03-25 Na140 mmol/L Glu 146 mg/dL<H> K+ 4.5 mmol/L Cr  1.05 mg/dL BUN 19 mg/dL Phos n/a   Alb 2.5 g/dL<L> PAB n/a           POCT Blood Glucose.: 129 mg/dL (03-25-21 @ 08:43)  POCT Blood Glucose.: 169 mg/dL (03-24-21 @ 21:01)  POCT Blood Glucose.: 147 mg/dL (03-24-21 @ 16:58)    Medications:  MEDICATIONS  (STANDING):  ascorbic acid 500 milliGRAM(s) Oral daily  cholecalciferol 4000 Unit(s) Oral daily  dexAMETHasone     Tablet 6 milliGRAM(s) Oral daily  dextrose 40% Gel 15 Gram(s) Oral once  dextrose 5%. 1000 milliLiter(s) (50 mL/Hr) IV Continuous <Continuous>  dextrose 5%. 1000 milliLiter(s) (100 mL/Hr) IV Continuous <Continuous>  dextrose 50% Injectable 25 Gram(s) IV Push once  dextrose 50% Injectable 12.5 Gram(s) IV Push once  dextrose 50% Injectable 25 Gram(s) IV Push once  enoxaparin Injectable 40 milliGRAM(s) SubCutaneous every 12 hours  famotidine    Tablet 20 milliGRAM(s) Oral daily  glucagon  Injectable 1 milliGRAM(s) IntraMuscular once  insulin lispro (ADMELOG) corrective regimen sliding scale   SubCutaneous three times a day before meals  multivitamin 1 Tablet(s) Oral daily  polyethylene glycol 3350 17 Gram(s) Oral daily  remdesivir  IVPB   IV Intermittent   senna 2 Tablet(s) Oral at bedtime  zinc sulfate 220 milliGRAM(s) Oral daily    MEDICATIONS  (PRN):    Admitted Anthropometrics:    Height (cm): 175.3 (03-24-21 @ 10:37)  Weight (kg): 145.1 (03-24-21 @ 10:37)  BMI (kg/m2): 47.2 (03-24-21 @ 10:37)    Nutrition Focused Physical Exam: Unable to complete due to limited isolation contact precautions at this time.     Estimated Energy Needs (_2175_ kcal/kg- _15__ kcal/kg):   Estimated Protein Needs (_87_ g/kg- _87__ g/kg):   Based on weight of: 145.1 kg.     [X  ] Nutrition Diagnosis: Obesity  [  ] No active nutrition diagnosis at this time  [  ] Current medical condition precludes nutrition intervention    Goal: maintain adequate nutrition/hydration    Nutrition Interventions:     Recommendations:      RD to follow-up per protocol.

## 2021-03-25 NOTE — PROGRESS NOTE ADULT - SUBJECTIVE AND OBJECTIVE BOX
Patient is a 70y old  Male who presents with a chief complaint of covid PNA, hypoxia (25 Mar 2021 09:51) Is feeling better than when he had come in. Breathing is easier. Finally slept. Appetite so so      FAMILY HISTORY:  FH: type 2 diabetes    FH: HTN (hypertension)      Vital Signs Last 24 Hrs  T(C): 36.4 (25 Mar 2021 09:08), Max: 36.9 (24 Mar 2021 10:37)  T(F): 97.6 (25 Mar 2021 09:08), Max: 98.4 (24 Mar 2021 10:37)  HR: 70 (25 Mar 2021 09:08) (70 - 89)  BP: 156/89 (25 Mar 2021 09:08) (126/68 - 186/73)  BP(mean): 101 (24 Mar 2021 12:22) (101 - 101)  RR: 20 (25 Mar 2021 09:08) (16 - 23)  SpO2: 98% (25 Mar 2021 09:08) (95% - 100%)  No Known Allergies    MEDICATIONS  (STANDING):  ascorbic acid 500 milliGRAM(s) Oral daily  cholecalciferol 4000 Unit(s) Oral daily  dexAMETHasone     Tablet 6 milliGRAM(s) Oral daily  dextrose 40% Gel 15 Gram(s) Oral once  dextrose 5%. 1000 milliLiter(s) (50 mL/Hr) IV Continuous <Continuous>  dextrose 5%. 1000 milliLiter(s) (100 mL/Hr) IV Continuous <Continuous>  dextrose 50% Injectable 25 Gram(s) IV Push once  dextrose 50% Injectable 12.5 Gram(s) IV Push once  dextrose 50% Injectable 25 Gram(s) IV Push once  enoxaparin Injectable 40 milliGRAM(s) SubCutaneous every 12 hours  famotidine    Tablet 20 milliGRAM(s) Oral daily  glucagon  Injectable 1 milliGRAM(s) IntraMuscular once  insulin lispro (ADMELOG) corrective regimen sliding scale   SubCutaneous three times a day before meals  multivitamin 1 Tablet(s) Oral daily  polyethylene glycol 3350 17 Gram(s) Oral daily  remdesivir  IVPB   IV Intermittent   senna 2 Tablet(s) Oral at bedtime  zinc sulfate 220 milliGRAM(s) Oral daily    MEDICATIONS  (PRN):      PHYSICAL EXAM:  GENERAL: NAD, well-groomed, well-developed  HEAD:  Atraumatic, Normocephalic  NERVOUS SYSTEM:  Alert & Oriented X3, Good concentration; Motor Strength 5/5 B/L upper and lower extremities; DTRs 2+ intact and symmetric  CHEST/LUNG: Wheezing at bases  HEART: Regular rate and rhythm; No murmurs, rubs, or gallops  ABDOMEN: Soft, Pendulous, Nontender, Nondistended; Bowel sounds present  EXTREMITIES:  2+ Peripheral Pulses, No clubbing, cyanosis, or edema  LYMPH: No lymphadenopathy noted  SKIN: No rashes or lesions    Consultant(s) Notes Reviewed:  [x ] YES  [ ] NO  Care Discussed with Consultants/Other Providers [ x] YES  [ ] NO    LABS:                        13.8   3.96  )-----------( 210      ( 25 Mar 2021 06:15 )             42.5     03-25    140  |  103  |  19  ----------------------------<  146<H>  4.5   |  29  |  1.05    Ca    8.9      25 Mar 2021 06:15    TPro  7.3  /  Alb  2.5<L>  /  TBili  0.2  /  DBili  x   /  AST  43<H>  /  ALT  75<H>  /  AlkPhos  86  03-25        PT/INR - ( 25 Mar 2021 05:00 )   PT: 13.7 sec;   INR: 1.14 ratio               RADIOLOGY & ADDITIONAL TESTS:    Imaging Personally Reviewed:  [ ] YES  [ x] NO

## 2021-03-26 LAB
ALBUMIN SERPL ELPH-MCNC: 2.4 G/DL — LOW (ref 3.3–5)
ALP SERPL-CCNC: 83 U/L — SIGNIFICANT CHANGE UP (ref 40–120)
ALT FLD-CCNC: 63 U/L — HIGH (ref 10–45)
ANION GAP SERPL CALC-SCNC: 6 MMOL/L — SIGNIFICANT CHANGE UP (ref 5–17)
AST SERPL-CCNC: 46 U/L — HIGH (ref 10–40)
BILIRUB DIRECT SERPL-MCNC: 0.1 MG/DL — SIGNIFICANT CHANGE UP (ref 0–0.2)
BILIRUB INDIRECT FLD-MCNC: 0.3 MG/DL — SIGNIFICANT CHANGE UP (ref 0.2–1)
BILIRUB SERPL-MCNC: 0.4 MG/DL — SIGNIFICANT CHANGE UP (ref 0.2–1.2)
BUN SERPL-MCNC: 26 MG/DL — HIGH (ref 7–23)
CALCIUM SERPL-MCNC: 8.5 MG/DL — SIGNIFICANT CHANGE UP (ref 8.4–10.5)
CHLORIDE SERPL-SCNC: 102 MMOL/L — SIGNIFICANT CHANGE UP (ref 96–108)
CO2 SERPL-SCNC: 30 MMOL/L — SIGNIFICANT CHANGE UP (ref 22–31)
CREAT SERPL-MCNC: 1.03 MG/DL — SIGNIFICANT CHANGE UP (ref 0.5–1.3)
CREAT SERPL-MCNC: 1.05 MG/DL — SIGNIFICANT CHANGE UP (ref 0.5–1.3)
GLUCOSE BLDC GLUCOMTR-MCNC: 125 MG/DL — HIGH (ref 70–99)
GLUCOSE BLDC GLUCOMTR-MCNC: 135 MG/DL — HIGH (ref 70–99)
GLUCOSE BLDC GLUCOMTR-MCNC: 94 MG/DL — SIGNIFICANT CHANGE UP (ref 70–99)
GLUCOSE SERPL-MCNC: 155 MG/DL — HIGH (ref 70–99)
HCT VFR BLD CALC: 41.9 % — SIGNIFICANT CHANGE UP (ref 39–50)
HGB BLD-MCNC: 13.5 G/DL — SIGNIFICANT CHANGE UP (ref 13–17)
INR BLD: 1.15 RATIO — SIGNIFICANT CHANGE UP (ref 0.88–1.16)
MAGNESIUM SERPL-MCNC: 1.9 MG/DL — SIGNIFICANT CHANGE UP (ref 1.6–2.6)
MCHC RBC-ENTMCNC: 29.3 PG — SIGNIFICANT CHANGE UP (ref 27–34)
MCHC RBC-ENTMCNC: 32.2 GM/DL — SIGNIFICANT CHANGE UP (ref 32–36)
MCV RBC AUTO: 90.9 FL — SIGNIFICANT CHANGE UP (ref 80–100)
NRBC # BLD: 0 /100 WBCS — SIGNIFICANT CHANGE UP (ref 0–0)
PHOSPHATE SERPL-MCNC: 2.9 MG/DL — SIGNIFICANT CHANGE UP (ref 2.5–4.5)
PLATELET # BLD AUTO: 238 K/UL — SIGNIFICANT CHANGE UP (ref 150–400)
POTASSIUM SERPL-MCNC: 4.4 MMOL/L — SIGNIFICANT CHANGE UP (ref 3.5–5.3)
POTASSIUM SERPL-SCNC: 4.4 MMOL/L — SIGNIFICANT CHANGE UP (ref 3.5–5.3)
PROT SERPL-MCNC: 6.9 G/DL — SIGNIFICANT CHANGE UP (ref 6–8.3)
PROTHROM AB SERPL-ACNC: 13.8 SEC — HIGH (ref 10.6–13.6)
RBC # BLD: 4.61 M/UL — SIGNIFICANT CHANGE UP (ref 4.2–5.8)
RBC # FLD: 14 % — SIGNIFICANT CHANGE UP (ref 10.3–14.5)
SODIUM SERPL-SCNC: 138 MMOL/L — SIGNIFICANT CHANGE UP (ref 135–145)
TROPONIN I SERPL-MCNC: 0.02 NG/ML — SIGNIFICANT CHANGE UP (ref 0.02–0.06)
WBC # BLD: 8.26 K/UL — SIGNIFICANT CHANGE UP (ref 3.8–10.5)
WBC # FLD AUTO: 8.26 K/UL — SIGNIFICANT CHANGE UP (ref 3.8–10.5)

## 2021-03-26 PROCEDURE — 93306 TTE W/DOPPLER COMPLETE: CPT | Mod: 26

## 2021-03-26 PROCEDURE — 93010 ELECTROCARDIOGRAM REPORT: CPT

## 2021-03-26 RX ORDER — METOPROLOL TARTRATE 50 MG
5 TABLET ORAL ONCE
Refills: 0 | Status: COMPLETED | OUTPATIENT
Start: 2021-03-26 | End: 2021-03-26

## 2021-03-26 RX ORDER — AMIODARONE HYDROCHLORIDE 400 MG/1
150 TABLET ORAL ONCE
Refills: 0 | Status: COMPLETED | OUTPATIENT
Start: 2021-03-26 | End: 2021-03-26

## 2021-03-26 RX ORDER — LOSARTAN POTASSIUM 100 MG/1
25 TABLET, FILM COATED ORAL DAILY
Refills: 0 | Status: ACTIVE | OUTPATIENT
Start: 2021-03-26 | End: 2022-02-22

## 2021-03-26 RX ORDER — MAGNESIUM SULFATE 500 MG/ML
1 VIAL (ML) INJECTION
Refills: 0 | Status: COMPLETED | OUTPATIENT
Start: 2021-03-26 | End: 2021-03-26

## 2021-03-26 RX ORDER — METOPROLOL TARTRATE 50 MG
25 TABLET ORAL
Refills: 0 | Status: DISCONTINUED | OUTPATIENT
Start: 2021-03-26 | End: 2021-03-27

## 2021-03-26 RX ADMIN — FAMOTIDINE 20 MILLIGRAM(S): 10 INJECTION INTRAVENOUS at 12:40

## 2021-03-26 RX ADMIN — Medication 500 MILLIGRAM(S): at 12:39

## 2021-03-26 RX ADMIN — ZINC SULFATE TAB 220 MG (50 MG ZINC EQUIVALENT) 220 MILLIGRAM(S): 220 (50 ZN) TAB at 12:39

## 2021-03-26 RX ADMIN — AMIODARONE HYDROCHLORIDE 618 MILLIGRAM(S): 400 TABLET ORAL at 19:51

## 2021-03-26 RX ADMIN — REMDESIVIR 500 MILLIGRAM(S): 5 INJECTION INTRAVENOUS at 06:59

## 2021-03-26 RX ADMIN — Medication 100 GRAM(S): at 21:51

## 2021-03-26 RX ADMIN — Medication 5 MILLIGRAM(S): at 17:28

## 2021-03-26 RX ADMIN — SENNA PLUS 2 TABLET(S): 8.6 TABLET ORAL at 21:51

## 2021-03-26 RX ADMIN — ENOXAPARIN SODIUM 40 MILLIGRAM(S): 100 INJECTION SUBCUTANEOUS at 17:30

## 2021-03-26 RX ADMIN — LOSARTAN POTASSIUM 25 MILLIGRAM(S): 100 TABLET, FILM COATED ORAL at 12:39

## 2021-03-26 RX ADMIN — Medication 100 GRAM(S): at 20:41

## 2021-03-26 RX ADMIN — ENOXAPARIN SODIUM 40 MILLIGRAM(S): 100 INJECTION SUBCUTANEOUS at 07:00

## 2021-03-26 RX ADMIN — Medication 6 MILLIGRAM(S): at 07:00

## 2021-03-26 RX ADMIN — Medication 4000 UNIT(S): at 12:39

## 2021-03-26 RX ADMIN — Medication 25 MILLIGRAM(S): at 20:41

## 2021-03-26 RX ADMIN — Medication 1 TABLET(S): at 12:39

## 2021-03-26 NOTE — PROVIDER CONTACT NOTE (EICU) - BACKGROUND
70 M admitted 3/24 for dyspnea in setting of COVID-19 pneumonia. On dexamethasone and Remdesivir. Saturating well on 2L. Had episode of NSVT associated with lightheadedness. Transferred to ICU for close monitoring.

## 2021-03-26 NOTE — CHART NOTE - NSCHARTNOTEFT_GEN_A_CORE
Called by nurse again for 40 bts of NSVT on tele monitor. Confirmed with Tele tech.   Pt. was lightheaded during episode, per nurse.  At bedside, pt sitting in recliner chair.  He denies chest pain, shortness of breath.     VS:  , 132/91, 16, afebrile.  Pt. is obese, non-labored breathing, on O2 via nc.  Lungs:  decreased breath sounds b/l  Heart:  tachycardia  Abd: obese, soft, nontender  Exts:  no pitting edema    #NSVT; multiple episodes  #possibly related to COVID infection  -Lopressor 5 mg. IVP x 1, then start on oral Lopressor if HR allows  -EKG done; noted  -spoke with Cardiology, if with persistent episodes of NSVT consider moving to ICU  -cycle trops, EKG in am  -Echo done today, normal LV function, +pulm htn

## 2021-03-26 NOTE — PROGRESS NOTE ADULT - SUBJECTIVE AND OBJECTIVE BOX
HPI: 70y Male , currently admitted to [facility] ICU with COVID-19 and NSVT    Interval Events:  -patient transferred to ICU from 2 [address] (patient is S/P right knee repalcement removal due to infection with anbx spacer in place at this time) for episode of non-sustained VT.  -per report and vital review never hypotensive during event, but did have light-headedness  -day team discussedcase with cardiology    Oxygen Support:  -Nasal canula            Vitalsigns/reviewed and physical exam performed where pertinent and urgently required.    Lab/radiology studies/ABG/Meds reviewed and interpreted into the assesment and treatment plan.    Assessment/Plan/Therapeutic interventions      Neuro: Awake and alert no current Issues    Cor:  patient sustained epiosdes of NSVT. BP maitnained during events, but did have lightheadedness. floor medical team gave patient lopressor. On ICu arival, patient with fluctuating HR, 130's donw to 80's. Day ICU team dicussed case with on-call cards, recommended amio 150 bolus, but not to start infusion at this time. Will matianin Po lopressor. If patient has repeat episodes of VT will initation amio infusion for complete 24 hour load.  -repeat labs on ICU arrival show K+ at goal, mag 1.9. Will give 2 grams of mag sulfate now    Pulm:  patient is admitted with COVID-19 PNA and is currently  on _____nasal canula____ oxygen support  -will actively titrate oxygen suppor for goal SaO2 >88%  -nebulizers as needed   -pulmonary toilet, encourage incetive spirometer use    [X   ] Decadron  [ X  ] Remdesevir    GI:  patient on __DASH______diet  [   ]protonix  [ X  ]pepcid    Renal: Renal function currently WNL,   -trend lytes/Scr daily with BMP  -I's and O's, goal UOP 0.5 cc/kg/hr      Heme:  Pharmacologic DVT Prophylaxis in addition to SCD's  [ X  ] Lovenox BID  [   ] Subcutaneous Heparin  [   ] Eliquis  [   ] Pharmacological prophylaxis currently contraindicated because of       Endo Aggressive glycemic control to limit FS glucose to < 180mg/dl.  [ X  ] Insulin sliding scale  [   ] Lantus  [   ] Currently patient does not require active management of blood sugars with insulin support       -patient will also need to be followed by ortho team while here regarding right knee anbx spacer, and subsequent surgical planning/dresing changes. Currently right leg in immobilizer        COVID 19 specific considerations and therapeuric options based on the available and rapidly changing literature    Goals of care considerations:  Ongoing assessment for patient specific treatment options based on progression or decline.  I have involved the family with updates and requests in guidance for medical decision making.      CRITICAL CARE TIME SPENT: 35 minutes of critical care time spent providing medical care for patient's acute illness/conditions that impairs at least one vital organ system and/or poses a high risk of imminent or life threatening deterioration in the patient's condition. It includes time spent evaluating and treating the patient's acute illness as well as time spent reviewing labs, radiology, discussing goals of care with patient and/or patient's family, and discussing the case with a multidisciplinary team, including the eICU, in an effort to prevent further life threatening deterioration or end organ damage. This time is independent of any procedures performed.      HPI: 70y Male , currently admitted to [facility] ICU with COVID-19 and NSVT    Interval Events:  -patient transferred to ICU from [address] [place] [address]  for episode of non-sustained VT.  -per report and vital review never hypotensive during event, but did have light-headedness  -day team discussedcase with cardiology    Oxygen Support:  -Nasal canula            Vitalsigns/reviewed and physical exam performed where pertinent and urgently required.    Lab/radiology studies/ABG/Meds reviewed and interpreted into the assesment and treatment plan.    Assessment/Plan/Therapeutic interventions      Neuro: Awake and alert no current Issues    Cor:  patient sustained epiosdes of NSVT. BP maitnained during events, but did have lightheadedness. floor medical team gave patient lopressor. On ICu arival, patient with fluctuating HR, 130's donw to 80's. Day ICU team dicussed case with on-call cards, recommended amio 150 bolus, but not to start infusion at this time. Will matianin Po lopressor. If patient has repeat episodes of VT will initation amio infusion for complete 24 hour load.  -repeat labs on ICU arrival show K+ at goal, mag 1.9. Will give 2 grams of mag sulfate now    Pulm:  patient is admitted with COVID-19 PNA and is currently  on _____nasal canula____ oxygen support  -will actively titrate oxygen suppor for goal SaO2 >88%  -nebulizers as needed   -pulmonary toilet, encourage incetive spirometer use    [X   ] Decadron  [ X  ] Remdesevir    GI:  patient on __DASH______diet  [   ]protonix  [ X  ]pepcid    Renal: Renal function currently WNL,   -trend lytes/Scr daily with BMP  -I's and O's, goal UOP 0.5 cc/kg/hr      Heme:  Pharmacologic DVT Prophylaxis in addition to SCD's  [ X  ] Lovenox BID  [   ] Subcutaneous Heparin  [   ] Eliquis  [   ] Pharmacological prophylaxis currently contraindicated because of       Endo Aggressive glycemic control to limit FS glucose to < 180mg/dl.  [ X  ] Insulin sliding scale  [   ] Lantus  [   ] Currently patient does not require active management of blood sugars with insulin support               COVID 19 specific considerations and therapeuric options based on the available and rapidly changing literature    Goals of care considerations:  Ongoing assessment for patient specific treatment options based on progression or decline.  I have involved the family with updates and requests in guidance for medical decision making.      CRITICAL CARE TIME SPENT: 35 minutes of critical care time spent providing medical care for patient's acute illness/conditions that impairs at least one vital organ system and/or poses a high risk of imminent or life threatening deterioration in the patient's condition. It includes time spent evaluating and treating the patient's acute illness as well as time spent reviewing labs, radiology, discussing goals of care with patient and/or patient's family, and discussing the case with a multidisciplinary team, including the eICU, in an effort to prevent further life threatening deterioration or end organ damage. This time is independent of any procedures performed.

## 2021-03-26 NOTE — CONSULT NOTE ADULT - ASSESSMENT
ASSESSMENT:  1. NSVT  2. COVID 19         PLAN:  - Transfer to ICU  - Zoll pads to chest  - Bedrest  - Amiodarone bolus, no gtt due to labile HR, discussed with cardiology who agrees  - Trend labs including COVID biomarker and supplement lytes  - 2-3L O2 via NC, wean as tolerated  - Covid appropriate isolation  - Chest PT/Pulm Toilet  - Consider adding bronchodilators  - Day 3 of Remdesivir  - Serial ECGs and Troponins  - Continue Lopressor with hold parameters  - PPX; Lovenox and Pepcid  - GOC: Full CODE

## 2021-03-26 NOTE — PROVIDER CONTACT NOTE (OTHER) - SITUATION
Pt told PCA that he felt dizzy, at the same time tele tech called to notify me that pt had 40 beat run of vtach
Pt was ambulating to and from bathroom, asymptomatic when tele called that patient had 18 beats Vtach.
Patient

## 2021-03-26 NOTE — CHART NOTE - NSCHARTNOTEFT_GEN_A_CORE
Reviewed prior progress notes, labs and imaging.    70 yr old male with h/o bradycardia s/p pacemaker, not on any meds, p/w cough, and shortness of breath for the last 2 days. Patient and family tested positive for COVID 1 week ago.    In ER his VS were stable. O2 sat was 95% at RA. CXR showed b/l PNA. WBC normal. procal 0.11.    #Pneumonia due to COVID 19  - Supplemental O2 PRN, currently on 2L nasal cannula sats 96%  - Remdesivir day 2/5  - Decadron day 3/10  - MVT, Vit c, Vit D, Zinc  - check biomarkers  - incentive spirometry    #Wide complex tachycardia, resolved  - Appreciate cardiology recs- no further intervention  - Monitor tele    #Elevated ddimer  #Transaminitis  - Likely due to covid  - Monitor    #Elevated BP  - no h/o HTN  - DASH diet  - anti-HTN if persistently elevated    #Constipation  - senna, miralax  - encouraged PO intake    #H/o bradycardia s/p PPM  - monitor    #Morbid obesity  - weight loss    DVT-P: lovenox BID            Primary Diagnosis:      Plan:        Anticipated Discharge:

## 2021-03-26 NOTE — CHART NOTE - NSCHARTNOTEFT_GEN_A_CORE
Called by Nurse; tele tech pt having 18 bts of NSVT.  Per nurse pt was exerting himself walking around in room and was short of breath.  Pt. here for COVID PNA, on oxygen supplementation.  Pt. now is NSR.  Spoke to Dr. Jade, Cardiology; they are following patient.  He suggested starting BB; ordered Metoprolol 25 mg. bid.  EKG and trop for the am.

## 2021-03-26 NOTE — PROGRESS NOTE ADULT - SUBJECTIVE AND OBJECTIVE BOX
Patient is a 70y old  Male who presents with a chief complaint of covid PNA, hypoxia (25 Mar 2021 16:10) Feels about the same. Still coughing. Energy level fair.     FAMILY HISTORY:  FH: type 2 diabetes    FH: HTN (hypertension)      Vital Signs Last 24 Hrs  T(C): 36.7 (26 Mar 2021 09:27), Max: 37.2 (25 Mar 2021 19:37)  T(F): 98 (26 Mar 2021 09:27), Max: 98.9 (25 Mar 2021 19:37)  HR: 74 (26 Mar 2021 09:27) (72 - 74)  BP: 158/98 (26 Mar 2021 09:27) (111/65 - 158/98)  BP(mean): --  RR: 16 (26 Mar 2021 09:27) (15 - 20)  SpO2: 97% (26 Mar 2021 09:27) (95% - 99%)  No Known Allergies    MEDICATIONS  (STANDING):  ascorbic acid 500 milliGRAM(s) Oral daily  cholecalciferol 4000 Unit(s) Oral daily  dexAMETHasone     Tablet 6 milliGRAM(s) Oral daily  dextrose 40% Gel 15 Gram(s) Oral once  dextrose 5%. 1000 milliLiter(s) (50 mL/Hr) IV Continuous <Continuous>  dextrose 5%. 1000 milliLiter(s) (100 mL/Hr) IV Continuous <Continuous>  dextrose 50% Injectable 25 Gram(s) IV Push once  dextrose 50% Injectable 12.5 Gram(s) IV Push once  dextrose 50% Injectable 25 Gram(s) IV Push once  enoxaparin Injectable 40 milliGRAM(s) SubCutaneous every 12 hours  famotidine    Tablet 20 milliGRAM(s) Oral daily  glucagon  Injectable 1 milliGRAM(s) IntraMuscular once  insulin lispro (ADMELOG) corrective regimen sliding scale   SubCutaneous three times a day before meals  multivitamin 1 Tablet(s) Oral daily  polyethylene glycol 3350 17 Gram(s) Oral daily  remdesivir  IVPB   IV Intermittent   remdesivir  IVPB 100 milliGRAM(s) IV Intermittent every 24 hours  senna 2 Tablet(s) Oral at bedtime  zinc sulfate 220 milliGRAM(s) Oral daily    MEDICATIONS  (PRN):      PHYSICAL EXAM:  GENERAL: NAD, well-groomed, well-developed  HEAD:  Atraumatic, Normocephalic  CHEST/LUNG: Decreased BS bases  HEART: Regular rate and rhythm; No murmurs, rubs, or gallops  ABDOMEN: Soft, Obese, Nontender, Nondistended; Bowel sounds present  EXTREMITIES:  2+ Peripheral Pulses, No clubbing, cyanosis, or edema  LYMPH: No lymphadenopathy noted  SKIN: No rashes or lesions    Consultant(s) Notes Reviewed:  [x ] YES  [ ] NO  Care Discussed with Consultants/Other Providers [ x] YES  [ ] NO    LABS:                        13.8   3.96  )-----------( 210      ( 25 Mar 2021 06:15 )             42.5     03-26    x   |  x   |  x   ----------------------------<  x   x    |  x   |  1.05    Ca    8.9      25 Mar 2021 06:15  Mg     2.2     03-25    TPro  6.9  /  Alb  2.4<L>  /  TBili  0.4  /  DBili  0.1  /  AST  46<H>  /  ALT  63<H>  /  AlkPhos  83  03-26        PT/INR - ( 26 Mar 2021 06:00 )   PT: 13.8 sec;   INR: 1.15 ratio               RADIOLOGY & ADDITIONAL TESTS:    Imaging Personally Reviewed:  [ ] YES  [ ] NO

## 2021-03-26 NOTE — PROVIDER CONTACT NOTE (OTHER) - ACTION/TREATMENT ORDERED:
VSS taken, BP WNL, HR sustaining 120s, Marina DIOP made aware, assessed pt at bedside, IVP metoprolol administered by Marina.  HR now in 60s, PO metoprolol to be given later.  Will continue to monitor
cont monitor
Marina DIOP called and made aware, pt asymptomatic, VSS, denies dizziness, lightheadedness, or CP.  Will continue to monitor the pt.

## 2021-03-26 NOTE — CONSULT NOTE ADULT - SUBJECTIVE AND OBJECTIVE BOX
Chief Complaint:     70 yr old male with /o bradycardia s/p pacemaker, not on any meds, p/w cough, and shortness of breath for the last 2 days, worse at night and unable to sleep. He along with his entire family was covid positive. he tested postive 1 week ago. Pt reports seen by pmd last week and was given prednisone, zpack, tensilon catrachito and inhaler which he has completed. but did not help. His O2 sat went down to 89% yesterday then mid 90s.  In ER his VS is table. O2 sat was 95% at RA. CXR showed b/l PNA. wbc normal. procal 0.11. LFT and Dddimer elevated. covid +. One doses each: dexamethasone, Ctx and zithromax was given and medical admission was called.  patient currently reported constipation, cough and mild SOB.     HPI:    PMH:   Bradycardia    Pacemaker    No pertinent past medical history      PSH:   Pacemaker    No significant past surgical history      Family History:  FAMILY HISTORY:  FH: type 2 diabetes    FH: HTN (hypertension)        Social History:  Smoking:  Alcohol:  Drugs:    Allergies:  No Known Allergies      Medications:  ascorbic acid 500 milliGRAM(s) Oral daily  cholecalciferol 4000 Unit(s) Oral daily  dexAMETHasone     Tablet 6 milliGRAM(s) Oral daily  dextrose 40% Gel 15 Gram(s) Oral once  dextrose 5%. 1000 milliLiter(s) IV Continuous <Continuous>  dextrose 5%. 1000 milliLiter(s) IV Continuous <Continuous>  dextrose 50% Injectable 25 Gram(s) IV Push once  dextrose 50% Injectable 12.5 Gram(s) IV Push once  dextrose 50% Injectable 25 Gram(s) IV Push once  enoxaparin Injectable 40 milliGRAM(s) SubCutaneous every 12 hours  famotidine    Tablet 20 milliGRAM(s) Oral daily  glucagon  Injectable 1 milliGRAM(s) IntraMuscular once  insulin lispro (ADMELOG) corrective regimen sliding scale   SubCutaneous three times a day before meals  multivitamin 1 Tablet(s) Oral daily  polyethylene glycol 3350 17 Gram(s) Oral daily  remdesivir  IVPB   IV Intermittent   senna 2 Tablet(s) Oral at bedtime  zinc sulfate 220 milliGRAM(s) Oral daily      REVIEW OF SYSTEMS:  CONSTITUTIONAL: No fever, weight loss, or fatigue  EYES: No eye pain, visual disturbances, or discharge  ENMT:  No difficulty hearing, tinnitus, vertigo; No sinus or throat pain  NECK: No pain or stiffness  BREASTS: No pain, masses, or nipple discharge  RESPIRATORY: No cough, wheezing, chills or hemoptysis; No shortness of breath  CARDIOVASCULAR: No chest pain, palpitations, dizziness, or leg swelling  GASTROINTESTINAL: No abdominal or epigastric pain. No nausea, vomiting, or hematemesis; No diarrhea or constipation. No melena or hematochezia.  GENITOURINARY: No dysuria, frequency, hematuria, or incontinence  NEUROLOGICAL: No headaches, memory loss, loss of strength, numbness, or tremors  SKIN: No itching, burning, rashes, or lesions   LYMPH NODES: No enlarged glands  ENDOCRINE: No heat or cold intolerance; No hair loss  MUSCULOSKELETAL: No joint pain or swelling; No muscle, back, or extremity pain  PSYCHIATRIC: No depression, anxiety, mood swings, or difficulty sleeping  HEME/LYMPH: No easy bruising, or bleeding gums  ALLERY AND IMMUNOLOGIC: No hives or eczema    Physical Exam:  T(C): 36.6 (03-25-21 @ 13:42), Max: 36.7 (03-24-21 @ 16:26)  HR: 72 (03-25-21 @ 13:42) (70 - 78)  BP: 143/70 (03-25-21 @ 13:42) (126/68 - 156/89)  RR: 20 (03-25-21 @ 13:42) (17 - 20)  SpO2: 99% (03-25-21 @ 13:42) (97% - 99%)  Wt(kg): --    GENERAL: NAD, obese  HEAD:  Atraumatic, Normocephalic  EYES: EOMI, conjunctiva and sclera clear  ENT: Moist mucous membranes,  NECK: Supple, No JVD, no bruits  CHEST/LUNG: Clear to percussion bilaterally; No rales, rhonchi, wheezing, or rubs  HEART: Regular rate and rhythm; No murmurs, rubs, or gallops PMI non displaced.  ABDOMEN: Soft, Nontender, Nondistended; Bowel sounds present  EXTREMITIES:  2+ Peripheral Pulses, No clubbing, cyanosis, or edema  SKIN: No rashes or lesions  NERVOUS SYSTEM:  Cranial Nerves II-XII intact     Cardiovascular Diagnostic Testing:  ECG:      < from: 12 Lead ECG (03.24.21 @ 10:52) >  Diagnosis Line Sinus rhythm with premature atrial complexes  Low voltage QRS  Otherwise normal ECG  No previous ECG available for comparison  Confirmed by JELYL PONCE MD ([id number]) on 3/25/2021 9:11:50 AM    < end of copied text >    ECHO:    10/17/19 nl lv hunter    Labs:                        13.8   3.96  )-----------( 210      ( 25 Mar 2021 06:15 )             42.5     03-25    140  |  103  |  19  ----------------------------<  146<H>  4.5   |  29  |  1.05    Ca    8.9      25 Mar 2021 06:15  Mg     2.2     03-25    TPro  7.3  /  Alb  2.5<L>  /  TBili  0.2  /  DBili  x   /  AST  43<H>  /  ALT  75<H>  /  AlkPhos  86  03-25    PT/INR - ( 25 Mar 2021 05:00 )   PT: 13.7 sec;   INR: 1.14 ratio         CARDIAC MARKERS ( 25 Mar 2021 06:15 )  .018 ng/mL / x     / x     / x     / x          Serum Pro-Brain Natriuretic Peptide: 794 pg/mL (03-25 @ 06:15)      Imaging:    < from: Xray Chest 1 View AP/PA (03.24.21 @ 10:59) >    There is mild new scattered interstitial infiltrate consistent with pneumonia or pneumonitis without segmental consolidation, layering effusion or cavitation. The heart is normal in size. A pacemaker remains in place.              SHANNAN CARUSO MD; Attending Radiologist  This document has been electronically signed. Mar 24 2021 11:43AM    < end of copied text >    discussion  wide complex rhythm idioventricular rhythm at 120 beats per min suggestive of underlying coronary disease vs metabolic derangement due to a covid pneumonia patient is asymptomatic would consider for ischemia evaluation when clinically stabilized from a covid pneumonia standpoint    pacer  will discuss recent interrogation dr harrison office    dw dr javier  
69 yo M with PMHx of obesity and PPM  presented on 3/24 for eval of cough and SOB. Patient reports his family,, including himself tested COVID-19 positive one week prior to presentation. Patient state his home physician prescribed him prednisone and zpack for symptoms. Despite therapy patient noted worsening symptoms. Patient was on the floor today when it was noted by telemetry that patient was having episodic NSVT, longest 40 beats. Patient reports only increased SOB on one occasion. Patient was also noted to have one event relasted to exertional activity. He reports having a device that measures his heart rate at home occasional has episodes of elevate heart rate that would return to baseline eventually. He denies fevers, sweats, chills, N/V/D, chest pain, abd pain, lightheadedness/dizziness.     Recent lab insignificant for acute findings. ECG without acute ischemic change. Hospitalist PA gave Lopressor under the instruction of Cardiology without improvement in condition leading to stat transfer.    PAST MEDICAL & SURGICAL HISTORY:  Bradycardia    Pacemaker    Pacemaker      MEDICATIONS  (STANDING):  ascorbic acid 500 milliGRAM(s) Oral daily  cholecalciferol 4000 Unit(s) Oral daily  dexAMETHasone     Tablet 6 milliGRAM(s) Oral daily  dextrose 40% Gel 15 Gram(s) Oral once  dextrose 5%. 1000 milliLiter(s) (50 mL/Hr) IV Continuous <Continuous>  dextrose 5%. 1000 milliLiter(s) (100 mL/Hr) IV Continuous <Continuous>  dextrose 50% Injectable 25 Gram(s) IV Push once  dextrose 50% Injectable 12.5 Gram(s) IV Push once  dextrose 50% Injectable 25 Gram(s) IV Push once  enoxaparin Injectable 40 milliGRAM(s) SubCutaneous every 12 hours  famotidine    Tablet 20 milliGRAM(s) Oral daily  glucagon  Injectable 1 milliGRAM(s) IntraMuscular once  insulin lispro (ADMELOG) corrective regimen sliding scale   SubCutaneous three times a day before meals  losartan 25 milliGRAM(s) Oral daily  metoprolol tartrate 25 milliGRAM(s) Oral two times a day  multivitamin 1 Tablet(s) Oral daily  polyethylene glycol 3350 17 Gram(s) Oral daily  remdesivir  IVPB   IV Intermittent   remdesivir  IVPB 100 milliGRAM(s) IV Intermittent every 24 hours  senna 2 Tablet(s) Oral at bedtime  zinc sulfate 220 milliGRAM(s) Oral daily    MEDICATIONS  (PRN):      Vital Signs Last 24 Hrs  T(C): 36.5 (26 Mar 2021 20:00), Max: 36.8 (26 Mar 2021 16:09)  T(F): 97.7 (26 Mar 2021 20:00), Max: 98.2 (26 Mar 2021 16:09)  HR: 62 (27 Mar 2021 06:00) (57 - 126)  BP: 138/81 (27 Mar 2021 06:00) (128/72 - 158/98)  BP(mean): 90 (27 Mar 2021 06:00) (82 - 96)  RR: 22 (27 Mar 2021 06:00) (16 - 29)  SpO2: 98% (27 Mar 2021 06:00) (92% - 99%)      PHYSICAL EXAM:  GENERAL: NAD, well-groomed, well-developed  HEAD:  Atraumatic, Normocephalic  NERVOUS SYSTEM:  Alert & Oriented X3, Good concentration; Motor Strength 5/5 B/L upper and lower extremities; DTRs 2+ intact and symmetric  CHEST/LUNGS: faint coarse breath sounDS  HEART: Regular rate and rhythm; No murmurs, rubs, or gallops  ABDOMEN: Soft,  Nontender, Nondistended; Bowel sounds present  EXTREMITIES:  2+ Peripheral Pulses, No clubbing, cyanosis, or edema  LYMPH: No lymphadenopathy noted  SKIN: No rashes or lesions

## 2021-03-27 LAB
ALBUMIN SERPL ELPH-MCNC: 2.1 G/DL — LOW (ref 3.3–5)
ALBUMIN SERPL ELPH-MCNC: 2.4 G/DL — LOW (ref 3.3–5)
ALP SERPL-CCNC: 68 U/L — SIGNIFICANT CHANGE UP (ref 40–120)
ALP SERPL-CCNC: 80 U/L — SIGNIFICANT CHANGE UP (ref 40–120)
ALT FLD-CCNC: 50 U/L — HIGH (ref 10–45)
ALT FLD-CCNC: 58 U/L — HIGH (ref 10–45)
ANION GAP SERPL CALC-SCNC: 6 MMOL/L — SIGNIFICANT CHANGE UP (ref 5–17)
AST SERPL-CCNC: 27 U/L — SIGNIFICANT CHANGE UP (ref 10–40)
AST SERPL-CCNC: 31 U/L — SIGNIFICANT CHANGE UP (ref 10–40)
BILIRUB DIRECT SERPL-MCNC: 0.1 MG/DL — SIGNIFICANT CHANGE UP (ref 0–0.2)
BILIRUB DIRECT SERPL-MCNC: 0.1 MG/DL — SIGNIFICANT CHANGE UP (ref 0–0.2)
BILIRUB INDIRECT FLD-MCNC: 0.2 MG/DL — SIGNIFICANT CHANGE UP (ref 0.2–1)
BILIRUB INDIRECT FLD-MCNC: 0.3 MG/DL — SIGNIFICANT CHANGE UP (ref 0.2–1)
BILIRUB SERPL-MCNC: 0.3 MG/DL — SIGNIFICANT CHANGE UP (ref 0.2–1.2)
BILIRUB SERPL-MCNC: 0.4 MG/DL — SIGNIFICANT CHANGE UP (ref 0.2–1.2)
BUN SERPL-MCNC: 21 MG/DL — SIGNIFICANT CHANGE UP (ref 7–23)
CALCIUM SERPL-MCNC: 7.6 MG/DL — LOW (ref 8.4–10.5)
CHLORIDE SERPL-SCNC: 109 MMOL/L — HIGH (ref 96–108)
CO2 SERPL-SCNC: 30 MMOL/L — SIGNIFICANT CHANGE UP (ref 22–31)
CREAT SERPL-MCNC: 0.94 MG/DL — SIGNIFICANT CHANGE UP (ref 0.5–1.3)
CREAT SERPL-MCNC: 1.11 MG/DL — SIGNIFICANT CHANGE UP (ref 0.5–1.3)
GLUCOSE BLDC GLUCOMTR-MCNC: 101 MG/DL — HIGH (ref 70–99)
GLUCOSE BLDC GLUCOMTR-MCNC: 109 MG/DL — HIGH (ref 70–99)
GLUCOSE BLDC GLUCOMTR-MCNC: 117 MG/DL — HIGH (ref 70–99)
GLUCOSE BLDC GLUCOMTR-MCNC: 118 MG/DL — HIGH (ref 70–99)
GLUCOSE SERPL-MCNC: 100 MG/DL — HIGH (ref 70–99)
HCT VFR BLD CALC: 40.9 % — SIGNIFICANT CHANGE UP (ref 39–50)
HGB BLD-MCNC: 13.3 G/DL — SIGNIFICANT CHANGE UP (ref 13–17)
INR BLD: 1.19 RATIO — HIGH (ref 0.88–1.16)
MAGNESIUM SERPL-MCNC: 2.2 MG/DL — SIGNIFICANT CHANGE UP (ref 1.6–2.6)
MCHC RBC-ENTMCNC: 29.5 PG — SIGNIFICANT CHANGE UP (ref 27–34)
MCHC RBC-ENTMCNC: 32.5 GM/DL — SIGNIFICANT CHANGE UP (ref 32–36)
MCV RBC AUTO: 90.7 FL — SIGNIFICANT CHANGE UP (ref 80–100)
NRBC # BLD: 0 /100 WBCS — SIGNIFICANT CHANGE UP (ref 0–0)
PHOSPHATE SERPL-MCNC: 3.1 MG/DL — SIGNIFICANT CHANGE UP (ref 2.5–4.5)
PLATELET # BLD AUTO: 238 K/UL — SIGNIFICANT CHANGE UP (ref 150–400)
POTASSIUM SERPL-MCNC: 4 MMOL/L — SIGNIFICANT CHANGE UP (ref 3.5–5.3)
POTASSIUM SERPL-SCNC: 4 MMOL/L — SIGNIFICANT CHANGE UP (ref 3.5–5.3)
PROT SERPL-MCNC: 5.9 G/DL — LOW (ref 6–8.3)
PROT SERPL-MCNC: 6.7 G/DL — SIGNIFICANT CHANGE UP (ref 6–8.3)
PROTHROM AB SERPL-ACNC: 14.3 SEC — HIGH (ref 10.6–13.6)
RBC # BLD: 4.51 M/UL — SIGNIFICANT CHANGE UP (ref 4.2–5.8)
RBC # FLD: 14.2 % — SIGNIFICANT CHANGE UP (ref 10.3–14.5)
SODIUM SERPL-SCNC: 145 MMOL/L — SIGNIFICANT CHANGE UP (ref 135–145)
TROPONIN I SERPL-MCNC: 0.03 NG/ML — SIGNIFICANT CHANGE UP (ref 0.02–0.06)
TROPONIN I SERPL-MCNC: 0.03 NG/ML — SIGNIFICANT CHANGE UP (ref 0.02–0.06)
WBC # BLD: 6.29 K/UL — SIGNIFICANT CHANGE UP (ref 3.8–10.5)
WBC # FLD AUTO: 6.29 K/UL — SIGNIFICANT CHANGE UP (ref 3.8–10.5)

## 2021-03-27 PROCEDURE — 93010 ELECTROCARDIOGRAM REPORT: CPT

## 2021-03-27 PROCEDURE — 99233 SBSQ HOSP IP/OBS HIGH 50: CPT

## 2021-03-27 RX ORDER — AMIODARONE HYDROCHLORIDE 400 MG/1
TABLET ORAL
Refills: 0 | Status: DISCONTINUED | OUTPATIENT
Start: 2021-03-27 | End: 2021-04-02

## 2021-03-27 RX ORDER — AMIODARONE HYDROCHLORIDE 400 MG/1
400 TABLET ORAL EVERY 8 HOURS
Refills: 0 | Status: COMPLETED | OUTPATIENT
Start: 2021-03-27 | End: 2021-03-31

## 2021-03-27 RX ORDER — METOPROLOL TARTRATE 50 MG
50 TABLET ORAL
Refills: 0 | Status: DISCONTINUED | OUTPATIENT
Start: 2021-03-27 | End: 2021-04-02

## 2021-03-27 RX ORDER — AMIODARONE HYDROCHLORIDE 400 MG/1
150 TABLET ORAL ONCE
Refills: 0 | Status: COMPLETED | OUTPATIENT
Start: 2021-03-27 | End: 2021-03-27

## 2021-03-27 RX ORDER — AMIODARONE HYDROCHLORIDE 400 MG/1
200 TABLET ORAL DAILY
Refills: 0 | Status: DISCONTINUED | OUTPATIENT
Start: 2021-03-31 | End: 2021-04-02

## 2021-03-27 RX ADMIN — Medication 6 MILLIGRAM(S): at 05:06

## 2021-03-27 RX ADMIN — REMDESIVIR 500 MILLIGRAM(S): 5 INJECTION INTRAVENOUS at 06:54

## 2021-03-27 RX ADMIN — ENOXAPARIN SODIUM 40 MILLIGRAM(S): 100 INJECTION SUBCUTANEOUS at 05:07

## 2021-03-27 RX ADMIN — Medication 4000 UNIT(S): at 16:23

## 2021-03-27 RX ADMIN — FAMOTIDINE 20 MILLIGRAM(S): 10 INJECTION INTRAVENOUS at 16:23

## 2021-03-27 RX ADMIN — Medication 500 MILLIGRAM(S): at 16:24

## 2021-03-27 RX ADMIN — LOSARTAN POTASSIUM 25 MILLIGRAM(S): 100 TABLET, FILM COATED ORAL at 05:13

## 2021-03-27 RX ADMIN — Medication 25 MILLIGRAM(S): at 05:08

## 2021-03-27 RX ADMIN — AMIODARONE HYDROCHLORIDE 206 MILLIGRAM(S): 400 TABLET ORAL at 22:48

## 2021-03-27 RX ADMIN — ENOXAPARIN SODIUM 40 MILLIGRAM(S): 100 INJECTION SUBCUTANEOUS at 18:17

## 2021-03-27 RX ADMIN — Medication 1 TABLET(S): at 16:24

## 2021-03-27 RX ADMIN — Medication 50 MILLIGRAM(S): at 18:17

## 2021-03-27 RX ADMIN — ZINC SULFATE TAB 220 MG (50 MG ZINC EQUIVALENT) 220 MILLIGRAM(S): 220 (50 ZN) TAB at 16:24

## 2021-03-27 RX ADMIN — AMIODARONE HYDROCHLORIDE 400 MILLIGRAM(S): 400 TABLET ORAL at 22:48

## 2021-03-27 NOTE — PROGRESS NOTE ADULT - SUBJECTIVE AND OBJECTIVE BOX
HPI: 70y Male , currently admitted to [facility] ICU with COVID-19 and NSVT    Interval Events:  -remains with intermittent cardiac arrythmias, cardiology has evaluated patient this evening     Oxygen Support:  -Nasal canula            Vitalsigns/reviewed and physical exam performed where pertinent and urgently required.    Lab/radiology studies/ABG/Meds reviewed and interpreted into the assesment and treatment plan.    Assessment/Plan/Therapeutic interventions      Neuro: Awake and alert no current Issues    Cor:  evaluated by cardiology today.  -case discussed with cardiologist Dr. lamas    -he feels that, as patient rersponded very well to intiial 150 mg bolus of amio and was actually in sinus rhythm post amio and now he is having continued arrythmia, with polymorphic Ventrivular beats, he feels patient would benefit from repeat bolus and tehn continue with PO amio load.    -I have placed these orders as discussed with cardiology.    Pulm:  patient is admitted with COVID-19 PNA and is currently  on _____nasal canula____ oxygen support  -will actively titrate oxygen suppor for goal SaO2 >88%  -nebulizers as needed   -pulmonary toilet, encourage incetive spirometer use    [X   ] Decadron  [ X  ] Remdesevir    GI:  patient on __DASH______diet  [   ]protonix  [ X  ]pepcid    Renal: Renal function currently WNL,   -trend lytes/Scr daily with BMP  -I's and O's, goal UOP 0.5 cc/kg/hr      Heme:  Pharmacologic DVT Prophylaxis in addition to SCD's  [ X  ] Lovenox BID  [   ] Subcutaneous Heparin  [   ] Eliquis  [   ] Pharmacological prophylaxis currently contraindicated because of       Endo Aggressive glycemic control to limit FS glucose to < 180mg/dl.  [ X  ] Insulin sliding scale  [   ] Lantus  [   ] Currently patient does not require active management of blood sugars with insulin support               COVID 19 specific considerations and therapeuric options based on the available and rapidly changing literature    Goals of care considerations:  Ongoing assessment for patient specific treatment options based on progression or decline.  I have involved the family with updates and requests in guidance for medical decision making.

## 2021-03-27 NOTE — PROGRESS NOTE ADULT - SUBJECTIVE AND OBJECTIVE BOX
SUBJ:    Home Medications:      MEDICATIONS  (STANDING):  ascorbic acid 500 milliGRAM(s) Oral daily  cholecalciferol 4000 Unit(s) Oral daily  dexAMETHasone     Tablet 6 milliGRAM(s) Oral daily  dextrose 40% Gel 15 Gram(s) Oral once  dextrose 5%. 1000 milliLiter(s) (50 mL/Hr) IV Continuous <Continuous>  dextrose 5%. 1000 milliLiter(s) (100 mL/Hr) IV Continuous <Continuous>  dextrose 50% Injectable 25 Gram(s) IV Push once  dextrose 50% Injectable 12.5 Gram(s) IV Push once  dextrose 50% Injectable 25 Gram(s) IV Push once  enoxaparin Injectable 40 milliGRAM(s) SubCutaneous every 12 hours  famotidine    Tablet 20 milliGRAM(s) Oral daily  glucagon  Injectable 1 milliGRAM(s) IntraMuscular once  insulin lispro (ADMELOG) corrective regimen sliding scale   SubCutaneous three times a day before meals  losartan 25 milliGRAM(s) Oral daily  metoprolol tartrate 50 milliGRAM(s) Oral two times a day  multivitamin 1 Tablet(s) Oral daily  polyethylene glycol 3350 17 Gram(s) Oral daily  remdesivir  IVPB   IV Intermittent   remdesivir  IVPB 100 milliGRAM(s) IV Intermittent every 24 hours  senna 2 Tablet(s) Oral at bedtime  zinc sulfate 220 milliGRAM(s) Oral daily    MEDICATIONS  (PRN):      Vital Signs Last 24 Hrs  T(C): 36.7 (27 Mar 2021 12:00), Max: 36.8 (26 Mar 2021 16:09)  T(F): 98.1 (27 Mar 2021 12:00), Max: 98.2 (26 Mar 2021 16:09)  HR: 103 (27 Mar 2021 14:00) (57 - 126)  BP: 168/104 (27 Mar 2021 12:00) (128/72 - 168/104)  BP(mean): 113 (27 Mar 2021 12:00) (82 - 113)  RR: 22 (27 Mar 2021 14:00) (16 - 31)  SpO2: 95% (27 Mar 2021 14:00) (92% - 99%)    REVIEW OF SYSTEMS:  As per HPI, otherwise unremarkable.     PHYSICAL EXAM:  · CONSTITUTIONAL: Well-developed, well nourished      · EYES: EOMI; PERRL; no drainage or redness  · NECK: No bruits; no thyromegaly or nodules  ·RESPIRATORY:   airway patent; breath sounds equal; good air movement; respirations non-labored; clear to auscultation bilaterally; no chest wall tenderness; no intercostal retractions; no rales,rhonchi or wheeze  · CARDIOVASCULAR: regular rate and rhythm  no rub  no murmur  normal PMI  . GASTROINTESTINAL:  no distention; no masses palpable; bowel sounds normal  · EXTREMITIES: No cyanosis, clubbing or edema  · VASCULAR:  Equal and normal pulses (radial, femoral)  ·NEUROLOGICAL:  Alert and oriented x 3; sensation intact; deep reflexes intact; cranial nerves intact; no spontaneous movement; superficial reflexes intact; normal strength  · SKIN: No lesions; no rash  . LYMPH NODES: No lymphadedenopathy  · MUSCULOSKELETAL:  No calf tenderness  no joint swelling	    TELEMETRY:    ECG:    TTE:    LABS:  CBC Full  -  ( 27 Mar 2021 05:00 )  WBC Count : 6.29 K/uL  RBC Count : 4.51 M/uL  Hemoglobin : 13.3 g/dL  Hematocrit : 40.9 %  Platelet Count - Automated : 238 K/uL  Mean Cell Volume : 90.7 fl  Mean Cell Hemoglobin : 29.5 pg  Mean Cell Hemoglobin Concentration : 32.5 gm/dL  Auto Neutrophil # : x  Auto Lymphocyte # : x  Auto Monocyte # : x  Auto Eosinophil # : x  Auto Basophil # : x  Auto Neutrophil % : x  Auto Lymphocyte % : x  Auto Monocyte % : x  Auto Eosinophil % : x  Auto Basophil % : x    03-27    145  |  109<H>  |  21  ----------------------------<  100<H>  4.0   |  30  |  0.94    Ca    7.6<L>      27 Mar 2021 05:00  Phos  3.1     03-27  Mg     2.2     03-27    TPro  5.9<L>  /  Alb  2.1<L>  /  TBili  0.3  /  DBili  0.1  /  AST  27  /  ALT  50<H>  /  AlkPhos  68  03-27    CARDIAC MARKERS ( 27 Mar 2021 05:00 )  .030 ng/mL / x     / x     / x     / x      CARDIAC MARKERS ( 27 Mar 2021 02:09 )  .030 ng/mL / x     / x     / x     / x      CARDIAC MARKERS ( 26 Mar 2021 18:15 )  .023 ng/mL / x     / x     / x     / x            RADIOLOGY & ADDITIONAL STUDIES:    IMPRESSION AND PLAN:        Pearl Kang MD, MPH, BECKY, RPVI, FAC  Cardiovascular Specialist   [facility] ([facility])  [facility]  c: [phone] (text preferred and/or call)  e: [email or web address]  For all [facility] Cardiology and Cardiovascular Surgery on-service contact/call information, go to [email or web address] and use "[email or web address]" to login.  For outpatient Cardiology appointments, call  [phone] to arrange with a colleague; I do not have outpatient Cardiology clinic.   SUBJ:  Noted increased ventricular ectopy, polymorphic, asymptomatic. Given increased frequency, transferred to cardiac ICU and given 150 mg amiodarone only with marked reduction in ectopy and maintaining rates 60s/70s. However, byt the end of the day, ectopy again increased in frequency and rate. Denies chest pain ad shortness of breath. Comfortable.     MEDICATIONS  (STANDING):  ascorbic acid 500 milliGRAM(s) Oral daily  cholecalciferol 4000 Unit(s) Oral daily  dexAMETHasone     Tablet 6 milliGRAM(s) Oral daily  dextrose 40% Gel 15 Gram(s) Oral once  dextrose 5%. 1000 milliLiter(s) (50 mL/Hr) IV Continuous <Continuous>  dextrose 5%. 1000 milliLiter(s) (100 mL/Hr) IV Continuous <Continuous>  dextrose 50% Injectable 25 Gram(s) IV Push once  dextrose 50% Injectable 12.5 Gram(s) IV Push once  dextrose 50% Injectable 25 Gram(s) IV Push once  enoxaparin Injectable 40 milliGRAM(s) SubCutaneous every 12 hours  famotidine    Tablet 20 milliGRAM(s) Oral daily  glucagon  Injectable 1 milliGRAM(s) IntraMuscular once  insulin lispro (ADMELOG) corrective regimen sliding scale   SubCutaneous three times a day before meals  losartan 25 milliGRAM(s) Oral daily  metoprolol tartrate 50 milliGRAM(s) Oral two times a day  multivitamin 1 Tablet(s) Oral daily  polyethylene glycol 3350 17 Gram(s) Oral daily  remdesivir  IVPB   IV Intermittent   remdesivir  IVPB 100 milliGRAM(s) IV Intermittent every 24 hours  senna 2 Tablet(s) Oral at bedtime  zinc sulfate 220 milliGRAM(s) Oral daily    Vital Signs Last 24 Hrs  T(C): 36.7 (27 Mar 2021 12:00), Max: 36.8 (26 Mar 2021 16:09)  T(F): 98.1 (27 Mar 2021 12:00), Max: 98.2 (26 Mar 2021 16:09)  HR: 103 (27 Mar 2021 14:00) (57 - 126)  BP: 168/104 (27 Mar 2021 12:00) (128/72 - 168/104)  BP(mean): 113 (27 Mar 2021 12:00) (82 - 113)  RR: 22 (27 Mar 2021 14:00) (16 - 31)  SpO2: 95% (27 Mar 2021 14:00) (92% - 99%)    REVIEW OF SYSTEMS:  As per HPI, otherwise unremarkable.     PHYSICAL EXAM:  · CONSTITUTIONAL: Well-developed, obese  · EYES: no drainage or redness  · NECK: Supple  ·RESPIRATORY:  respirations non-labored; reduced breath sounds   · CARDIOVASCULAR: irregularly irregular   . GASTROINTESTINAL:  no distention; no masses palpable  · EXTREMITIES: No cyanosis, clubbing. 1+ pitting edema  · VASCULAR:  Equal and normal pulses (radial  ·NEUROLOGICAL:  Alert and oriented x 3  · SKIN: No lesions; no rash  . LYMPH NODES: No lymphadedenopathy  · MUSCULOSKELETAL:  no joint swelling	    TELEMETRY: 3/27/2021  Increase ventricular ectopy, NSVT, polymorphic     TTE: 3/26/2021  Summary:   1. Left ventricular ejection fraction, by visual estimation, is 50 to 55%.   2. Mildly decreased global left ventricular systolic function.   3. Mildly increased LV wall thickness.   4. Normal left ventricular internal cavity size.   5. There is mild concentric left ventricular hypertrophy.   6. Mildly enlarged left atrium.   7. Thickening of the anterior and posterior mitral valve leaflets.   8. Trace mitral valve regurgitation.   9. Mild-moderate tricuspid regurgitation.  10. Estimated pulmonary artery systolic pressure is 51.6 mmHg assuming a right atrial pressure of 10 mmHg, which is consistent with moderate pulmonary hypertension.    LABS:  CBC Full  -  ( 27 Mar 2021 05:00 )  WBC Count : 6.29 K/uL  RBC Count : 4.51 M/uL  Hemoglobin : 13.3 g/dL  Hematocrit : 40.9 %  Platelet Count - Automated : 238 K/uL  Mean Cell Volume : 90.7 fl  Mean Cell Hemoglobin : 29.5 pg  Mean Cell Hemoglobin Concentration : 32.5 gm/dL    03-27    145  |  109<H>  |  21  ----------------------------<  100<H>  4.0   |  30  |  0.94    Ca    7.6<L>      27 Mar 2021 05:00  Phos  3.1     03-27  Mg     2.2     03-27    TPro  5.9<L>  /  Alb  2.1<L>  /  TBili  0.3  /  DBili  0.1  /  AST  27  /  ALT  50<H>  /  AlkPhos  68  03-27    CARDIAC MARKERS ( 27 Mar 2021 05:00 )  .030 ng/mL / x     / x     / x     / x      CARDIAC MARKERS ( 27 Mar 2021 02:09 )  .030 ng/mL / x     / x     / x     / x      CARDIAC MARKERS ( 26 Mar 2021 18:15 )  .023 ng/mL / x     / x     / x     / x        IMPRESSION AND PLAN:  70 yr old male with past medical history of HTN, bradycardia s/p pacemaker, not on any meds, presenting with COVID-19 pneumonia and incidentally noted to have high burden of wide complex irregular ectopy.    1) Ventricular ectopy   Increased frequency   Asymptomatic   Negative cardiac enzymes   Unknown etiology electrolyte derangements COVID-19 infection/myocarditis, CAD/ischemia, other.     ·	Start and continue amiodarone, [id number] mg again and  mg TID is adequate. Responds well.   ·	Continue metoprolol tartrate 50 mg BID.  ·	Suggest doing a nuclear pharmacology stress test early next week or when stabilized /safe fro ma COVID standpoint.   ·	Start aspirin 81 mg daily and atorvastatin 20 mg nightly with elevated ASCVD risk.     Pearl Kang MD, MPH, BECKY, RPVI, FACC  Cardiovascular Specialist   [facility] ([facility])  [facility]  c: [phone] (text preferred and/or call)  e: [email or web address]  For all [facility] Cardiology and Cardiovascular Surgery on-service contact/call information, go to [email or web address] and use "[email or web address]" to login.  For outpatient Cardiology appointments, call  [phone] to arrange with a colleague; I do not have outpatient Cardiology clinic.   SUBJ:  Noted increased ventricular ectopy, polymorphic, asymptomatic. Given increased frequency, transferred to cardiac ICU and given 150 mg amiodarone only with marked reduction in ectopy and maintaining rates 60s/70s. However, byt the end of the day, ectopy again increased in frequency and rate. Denies chest pain ad shortness of breath. Comfortable.     MEDICATIONS  (STANDING):  ascorbic acid 500 milliGRAM(s) Oral daily  cholecalciferol 4000 Unit(s) Oral daily  dexAMETHasone     Tablet 6 milliGRAM(s) Oral daily  dextrose 40% Gel 15 Gram(s) Oral once  dextrose 5%. 1000 milliLiter(s) (50 mL/Hr) IV Continuous <Continuous>  dextrose 5%. 1000 milliLiter(s) (100 mL/Hr) IV Continuous <Continuous>  dextrose 50% Injectable 25 Gram(s) IV Push once  dextrose 50% Injectable 12.5 Gram(s) IV Push once  dextrose 50% Injectable 25 Gram(s) IV Push once  enoxaparin Injectable 40 milliGRAM(s) SubCutaneous every 12 hours  famotidine    Tablet 20 milliGRAM(s) Oral daily  glucagon  Injectable 1 milliGRAM(s) IntraMuscular once  insulin lispro (ADMELOG) corrective regimen sliding scale   SubCutaneous three times a day before meals  losartan 25 milliGRAM(s) Oral daily  metoprolol tartrate 50 milliGRAM(s) Oral two times a day  multivitamin 1 Tablet(s) Oral daily  polyethylene glycol 3350 17 Gram(s) Oral daily  remdesivir  IVPB   IV Intermittent   remdesivir  IVPB 100 milliGRAM(s) IV Intermittent every 24 hours  senna 2 Tablet(s) Oral at bedtime  zinc sulfate 220 milliGRAM(s) Oral daily    Vital Signs Last 24 Hrs  T(C): 36.7 (27 Mar 2021 12:00), Max: 36.8 (26 Mar 2021 16:09)  T(F): 98.1 (27 Mar 2021 12:00), Max: 98.2 (26 Mar 2021 16:09)  HR: 103 (27 Mar 2021 14:00) (57 - 126)  BP: 168/104 (27 Mar 2021 12:00) (128/72 - 168/104)  BP(mean): 113 (27 Mar 2021 12:00) (82 - 113)  RR: 22 (27 Mar 2021 14:00) (16 - 31)  SpO2: 95% (27 Mar 2021 14:00) (92% - 99%)    REVIEW OF SYSTEMS:  As per HPI, otherwise unremarkable.     PHYSICAL EXAM:  · CONSTITUTIONAL: Well-developed, obese  · EYES: no drainage or redness  · NECK: Supple  ·RESPIRATORY:  respirations non-labored; reduced breath sounds   · CARDIOVASCULAR: irregularly irregular   . GASTROINTESTINAL:  no distention; no masses palpable  · EXTREMITIES: No cyanosis, clubbing. 1+ pitting edema  · VASCULAR:  Equal and normal pulses (radial  ·NEUROLOGICAL:  Alert and oriented x 3  · SKIN: No lesions; no rash  . LYMPH NODES: No lymphadedenopathy  · MUSCULOSKELETAL:  no joint swelling	    TELEMETRY: 3/27/2021  Increase ventricular ectopy, NSVT, polymorphic     TTE: 3/26/2021  Summary:   1. Left ventricular ejection fraction, by visual estimation, is 50 to 55%.   2. Mildly decreased global left ventricular systolic function.   3. Mildly increased LV wall thickness.   4. Normal left ventricular internal cavity size.   5. There is mild concentric left ventricular hypertrophy.   6. Mildly enlarged left atrium.   7. Thickening of the anterior and posterior mitral valve leaflets.   8. Trace mitral valve regurgitation.   9. Mild-moderate tricuspid regurgitation.  10. Estimated pulmonary artery systolic pressure is 51.6 mmHg assuming a right atrial pressure of 10 mmHg, which is consistent with moderate pulmonary hypertension.    LABS:  CBC Full  -  ( 27 Mar 2021 05:00 )  WBC Count : 6.29 K/uL  RBC Count : 4.51 M/uL  Hemoglobin : 13.3 g/dL  Hematocrit : 40.9 %  Platelet Count - Automated : 238 K/uL  Mean Cell Volume : 90.7 fl  Mean Cell Hemoglobin : 29.5 pg  Mean Cell Hemoglobin Concentration : 32.5 gm/dL    03-27    145  |  109<H>  |  21  ----------------------------<  100<H>  4.0   |  30  |  0.94    Ca    7.6<L>      27 Mar 2021 05:00  Phos  3.1     03-27  Mg     2.2     03-27    TPro  5.9<L>  /  Alb  2.1<L>  /  TBili  0.3  /  DBili  0.1  /  AST  27  /  ALT  50<H>  /  AlkPhos  68  03-27    CARDIAC MARKERS ( 27 Mar 2021 05:00 )  .030 ng/mL / x     / x     / x     / x      CARDIAC MARKERS ( 27 Mar 2021 02:09 )  .030 ng/mL / x     / x     / x     / x      CARDIAC MARKERS ( 26 Mar 2021 18:15 )  .023 ng/mL / x     / x     / x     / x        IMPRESSION AND PLAN:  70 yr old male with past medical history of HTN, bradycardia s/p pacemaker, not on any meds, presenting with COVID-19 pneumonia and incidentally noted to have high burden of wide complex irregular ectopy.    1) Ventricular ectopy   Increased frequency   Asymptomatic   Negative cardiac enzymes   Unknown etiology electrolyte derangements COVID-19 infection/myocarditis, CAD/ischemia, other.     ·	Start and continue amiodarone, [id number] mg again and  mg TID is adequate. Responds well.   ·	Continue metoprolol tartrate 50 mg BID.  ·	Suggest doing a nuclear pharmacology stress test early next week or when stabilized /safe fro ma COVID standpoint.   ·	Start aspirin 81 mg daily and atorvastatin 20 mg nightly with elevated ASCVD risk.     2) AF   new diagnosis  Elevated CHADS2-Vasc 3 (HTN, borderline DM, Age)     ·	Please obtain ECG (I asked nursing)  ·	Start A/C with rivaroxaban 20 mg daily (used in obese patients dose unchanged); easier to manage than heparin ggt in obese patient)   ·	Continue meds as above.     3) HTN   Acceptable with room for improvement.     ·	Continue medical management with losartan 25 mg daily, triturate as needed.     Pearl Kang MD, MPH, BECKY, RPVI, FACC  Cardiovascular Specialist   [facility] ([facility])  [facility]  c: [phone] (text preferred and/or call)  e: [email or web address]  For all [facility] Cardiology and Cardiovascular Surgery on-service contact/call information, go to [email or web address] and use "[email or web address]" to login.  For outpatient Cardiology appointments, call  [phone] to arrange with a colleague; I do not have outpatient Cardiology clinic.

## 2021-03-27 NOTE — CHART NOTE - NSCHARTNOTEFT_GEN_A_CORE
-case discussed with cardiologist Dr. lamas    -he feels that, as patient rersponded very well to intiial 150 mg bolus of amio and was actually in sinus rhythm post amio and now he is having continued arrythmia, with polymorphic Ventrivular beats, he feels patient would benefit from repeat bolus and tehn continue with PO amio load.    -I have placed these orders as discussed with cardiology

## 2021-03-27 NOTE — PROGRESS NOTE ADULT - SUBJECTIVE AND OBJECTIVE BOX
Follow-up Critical Care Progress Note  Chief Complaint : Hypoxemia    Admitted yesterday for NSVT. Patient is awake and alert. Not in distress    Allergies :No Known Allergies      PAST MEDICAL & SURGICAL HISTORY:  Bradycardia    Pacemaker    Pacemaker        Medications:  MEDICATIONS  (STANDING):  ascorbic acid 500 milliGRAM(s) Oral daily  cholecalciferol 4000 Unit(s) Oral daily  dexAMETHasone     Tablet 6 milliGRAM(s) Oral daily  dextrose 40% Gel 15 Gram(s) Oral once  dextrose 5%. 1000 milliLiter(s) (50 mL/Hr) IV Continuous <Continuous>  dextrose 5%. 1000 milliLiter(s) (100 mL/Hr) IV Continuous <Continuous>  dextrose 50% Injectable 25 Gram(s) IV Push once  dextrose 50% Injectable 12.5 Gram(s) IV Push once  dextrose 50% Injectable 25 Gram(s) IV Push once  enoxaparin Injectable 40 milliGRAM(s) SubCutaneous every 12 hours  famotidine    Tablet 20 milliGRAM(s) Oral daily  glucagon  Injectable 1 milliGRAM(s) IntraMuscular once  insulin lispro (ADMELOG) corrective regimen sliding scale   SubCutaneous three times a day before meals  losartan 25 milliGRAM(s) Oral daily  metoprolol tartrate 25 milliGRAM(s) Oral two times a day  multivitamin 1 Tablet(s) Oral daily  polyethylene glycol 3350 17 Gram(s) Oral daily  remdesivir  IVPB   IV Intermittent   remdesivir  IVPB 100 milliGRAM(s) IV Intermittent every 24 hours  senna 2 Tablet(s) Oral at bedtime  zinc sulfate 220 milliGRAM(s) Oral daily    MEDICATIONS  (PRN):      LABS:                        13.3   6.29  )-----------( 238      ( 27 Mar 2021 05:00 )             40.9     03-27    145  |  109<H>  |  21  ----------------------------<  100<H>  4.0   |  30  |  0.94    Ca    7.6<L>      27 Mar 2021 05:00  Phos  3.1     03-27  Mg     2.2     03-27    TPro  5.9<L>  /  Alb  2.1<L>  /  TBili  0.3  /  DBili  0.1  /  AST  27  /  ALT  50<H>  /  AlkPhos  68  03-27    HIT ab -- 03-24 @ 11:32  HIT ab EIA --  D Dimer -298    CARDIAC MARKERS ( 27 Mar 2021 05:00 )  .030 ng/mL / x     / x     / x     / x      CARDIAC MARKERS ( 27 Mar 2021 02:09 )  .030 ng/mL / x     / x     / x     / x      CARDIAC MARKERS ( 26 Mar 2021 18:15 )  .023 ng/mL / x     / x     / x     / x        PT/INR - ( 27 Mar 2021 02:09 )   PT: 14.3 sec;   INR: 1.19 ratio      Procalcitonin, Serum: 0.09 ng/mL (03-25-21 @ 06:15)    Serum Pro-Brain Natriuretic Peptide: 794 pg/mL (03-25-21 @ 06:15)    VITALS:  T(C): 36.7 (03-27-21 @ 08:00), Max: 36.8 (03-26-21 @ 16:09)  T(F): 98.1 (03-27-21 @ 08:00), Max: 98.2 (03-26-21 @ 16:09)  HR: 102 (03-27-21 @ 10:00) (57 - 126)  BP: 143/66 (03-27-21 @ 10:00) (128/72 - 154/78)  BP(mean): 86 (03-27-21 @ 10:00) (82 - 101)  ABP: --  ABP(mean): --  RR: 24 (03-27-21 @ 10:00) (16 - 31)  SpO2: 96% (03-27-21 @ 10:00) (92% - 99%)  CVP(mm Hg): --  CVP(cm H2O): --    Ins and Outs     03-26-21 @ 07:01  -  03-27-21 @ 07:00  --------------------------------------------------------  IN: 1420 mL / OUT: 2100 mL / NET: -680 mL    03-27-21 @ 07:01  -  03-27-21 @ 11:55  --------------------------------------------------------  IN: 240 mL / OUT: 0 mL / NET: 240 mL    I&O's Detail    26 Mar 2021 07:01  -  27 Mar 2021 07:00  --------------------------------------------------------  IN:    Oral Fluid: 1420 mL  Total IN: 1420 mL    OUT:    Voided (mL): 2100 mL  Total OUT: 2100 mL    Total NET: -680 mL      27 Mar 2021 07:01  -  27 Mar 2021 11:55  --------------------------------------------------------  IN:    Oral Fluid: 240 mL  Total IN: 240 mL    OUT:  Total OUT: 0 mL    Total NET: 240 mL

## 2021-03-28 LAB
ALBUMIN SERPL ELPH-MCNC: 2.5 G/DL — LOW (ref 3.3–5)
ALBUMIN SERPL ELPH-MCNC: 2.5 G/DL — LOW (ref 3.3–5)
ALP SERPL-CCNC: 76 U/L — SIGNIFICANT CHANGE UP (ref 40–120)
ALP SERPL-CCNC: 77 U/L — SIGNIFICANT CHANGE UP (ref 40–120)
ALT FLD-CCNC: 50 U/L — HIGH (ref 10–45)
ALT FLD-CCNC: 52 U/L — HIGH (ref 10–45)
ANION GAP SERPL CALC-SCNC: 5 MMOL/L — SIGNIFICANT CHANGE UP (ref 5–17)
APTT BLD: 26.3 SEC — LOW (ref 27.5–35.5)
AST SERPL-CCNC: 29 U/L — SIGNIFICANT CHANGE UP (ref 10–40)
AST SERPL-CCNC: 42 U/L — HIGH (ref 10–40)
BILIRUB DIRECT SERPL-MCNC: 0.1 MG/DL — SIGNIFICANT CHANGE UP (ref 0–0.2)
BILIRUB INDIRECT FLD-MCNC: 0.5 MG/DL — SIGNIFICANT CHANGE UP (ref 0.2–1)
BILIRUB SERPL-MCNC: 0.6 MG/DL — SIGNIFICANT CHANGE UP (ref 0.2–1.2)
BILIRUB SERPL-MCNC: 0.6 MG/DL — SIGNIFICANT CHANGE UP (ref 0.2–1.2)
BUN SERPL-MCNC: 23 MG/DL — SIGNIFICANT CHANGE UP (ref 7–23)
CALCIUM SERPL-MCNC: 8.7 MG/DL — SIGNIFICANT CHANGE UP (ref 8.4–10.5)
CHLORIDE SERPL-SCNC: 102 MMOL/L — SIGNIFICANT CHANGE UP (ref 96–108)
CO2 SERPL-SCNC: 32 MMOL/L — HIGH (ref 22–31)
CREAT SERPL-MCNC: 1.02 MG/DL — SIGNIFICANT CHANGE UP (ref 0.5–1.3)
CREAT SERPL-MCNC: 1.08 MG/DL — SIGNIFICANT CHANGE UP (ref 0.5–1.3)
GLUCOSE BLDC GLUCOMTR-MCNC: 86 MG/DL — SIGNIFICANT CHANGE UP (ref 70–99)
GLUCOSE SERPL-MCNC: 92 MG/DL — SIGNIFICANT CHANGE UP (ref 70–99)
HCT VFR BLD CALC: 42.2 % — SIGNIFICANT CHANGE UP (ref 39–50)
HGB BLD-MCNC: 14 G/DL — SIGNIFICANT CHANGE UP (ref 13–17)
INR BLD: 1.21 RATIO — HIGH (ref 0.88–1.16)
MAGNESIUM SERPL-MCNC: 2.1 MG/DL — SIGNIFICANT CHANGE UP (ref 1.6–2.6)
MCHC RBC-ENTMCNC: 29.7 PG — SIGNIFICANT CHANGE UP (ref 27–34)
MCHC RBC-ENTMCNC: 33.2 GM/DL — SIGNIFICANT CHANGE UP (ref 32–36)
MCV RBC AUTO: 89.4 FL — SIGNIFICANT CHANGE UP (ref 80–100)
NRBC # BLD: 0 /100 WBCS — SIGNIFICANT CHANGE UP (ref 0–0)
PHOSPHATE SERPL-MCNC: 3.6 MG/DL — SIGNIFICANT CHANGE UP (ref 2.5–4.5)
PLATELET # BLD AUTO: 271 K/UL — SIGNIFICANT CHANGE UP (ref 150–400)
POTASSIUM SERPL-MCNC: 4.6 MMOL/L — SIGNIFICANT CHANGE UP (ref 3.5–5.3)
POTASSIUM SERPL-SCNC: 4.6 MMOL/L — SIGNIFICANT CHANGE UP (ref 3.5–5.3)
PROT SERPL-MCNC: 6.8 G/DL — SIGNIFICANT CHANGE UP (ref 6–8.3)
PROT SERPL-MCNC: 6.9 G/DL — SIGNIFICANT CHANGE UP (ref 6–8.3)
PROTHROM AB SERPL-ACNC: 14.5 SEC — HIGH (ref 10.6–13.6)
RBC # BLD: 4.72 M/UL — SIGNIFICANT CHANGE UP (ref 4.2–5.8)
RBC # FLD: 13.9 % — SIGNIFICANT CHANGE UP (ref 10.3–14.5)
SODIUM SERPL-SCNC: 139 MMOL/L — SIGNIFICANT CHANGE UP (ref 135–145)
WBC # BLD: 13 K/UL — HIGH (ref 3.8–10.5)
WBC # FLD AUTO: 13 K/UL — HIGH (ref 3.8–10.5)

## 2021-03-28 PROCEDURE — 93010 ELECTROCARDIOGRAM REPORT: CPT

## 2021-03-28 RX ORDER — RIVAROXABAN 15 MG-20MG
20 KIT ORAL
Refills: 0 | Status: DISCONTINUED | OUTPATIENT
Start: 2021-03-28 | End: 2021-04-01

## 2021-03-28 RX ORDER — ASPIRIN/CALCIUM CARB/MAGNESIUM 324 MG
81 TABLET ORAL DAILY
Refills: 0 | Status: DISCONTINUED | OUTPATIENT
Start: 2021-03-28 | End: 2021-04-02

## 2021-03-28 RX ORDER — ATORVASTATIN CALCIUM 80 MG/1
20 TABLET, FILM COATED ORAL AT BEDTIME
Refills: 0 | Status: DISCONTINUED | OUTPATIENT
Start: 2021-03-28 | End: 2021-04-02

## 2021-03-28 RX ADMIN — Medication 50 MILLIGRAM(S): at 17:02

## 2021-03-28 RX ADMIN — LOSARTAN POTASSIUM 25 MILLIGRAM(S): 100 TABLET, FILM COATED ORAL at 05:30

## 2021-03-28 RX ADMIN — Medication 1 TABLET(S): at 11:31

## 2021-03-28 RX ADMIN — Medication 81 MILLIGRAM(S): at 12:55

## 2021-03-28 RX ADMIN — Medication 4000 UNIT(S): at 11:31

## 2021-03-28 RX ADMIN — FAMOTIDINE 20 MILLIGRAM(S): 10 INJECTION INTRAVENOUS at 11:31

## 2021-03-28 RX ADMIN — Medication 6 MILLIGRAM(S): at 05:30

## 2021-03-28 RX ADMIN — RIVAROXABAN 20 MILLIGRAM(S): KIT at 17:02

## 2021-03-28 RX ADMIN — ZINC SULFATE TAB 220 MG (50 MG ZINC EQUIVALENT) 220 MILLIGRAM(S): 220 (50 ZN) TAB at 11:31

## 2021-03-28 RX ADMIN — Medication 50 MILLIGRAM(S): at 05:30

## 2021-03-28 RX ADMIN — ENOXAPARIN SODIUM 40 MILLIGRAM(S): 100 INJECTION SUBCUTANEOUS at 05:31

## 2021-03-28 RX ADMIN — Medication 500 MILLIGRAM(S): at 11:31

## 2021-03-28 RX ADMIN — AMIODARONE HYDROCHLORIDE 400 MILLIGRAM(S): 400 TABLET ORAL at 22:00

## 2021-03-28 RX ADMIN — AMIODARONE HYDROCHLORIDE 400 MILLIGRAM(S): 400 TABLET ORAL at 15:32

## 2021-03-28 RX ADMIN — ATORVASTATIN CALCIUM 20 MILLIGRAM(S): 80 TABLET, FILM COATED ORAL at 21:59

## 2021-03-28 RX ADMIN — AMIODARONE HYDROCHLORIDE 400 MILLIGRAM(S): 400 TABLET ORAL at 07:01

## 2021-03-28 RX ADMIN — REMDESIVIR 500 MILLIGRAM(S): 5 INJECTION INTRAVENOUS at 07:15

## 2021-03-28 NOTE — PROGRESS NOTE ADULT - SUBJECTIVE AND OBJECTIVE BOX
Follow-up Critical Care Progress Note  Chief Complaint : Hypoxemia    Episodes of ectopy overnight, started on amiodarone loading PO. Respiratory status stable. Still on NC oxygen. Appears comfortable. Wants to go home.     Allergies :No Known Allergies      PAST MEDICAL & SURGICAL HISTORY:  Bradycardia    Pacemaker    Pacemaker        Medications:  MEDICATIONS  (STANDING):  aMIOdarone    Tablet   Oral   aMIOdarone    Tablet 400 milliGRAM(s) Oral every 8 hours  ascorbic acid 500 milliGRAM(s) Oral daily  cholecalciferol 4000 Unit(s) Oral daily  dexAMETHasone     Tablet 6 milliGRAM(s) Oral daily  dextrose 40% Gel 15 Gram(s) Oral once  dextrose 5%. 1000 milliLiter(s) (50 mL/Hr) IV Continuous <Continuous>  dextrose 5%. 1000 milliLiter(s) (100 mL/Hr) IV Continuous <Continuous>  dextrose 50% Injectable 25 Gram(s) IV Push once  dextrose 50% Injectable 12.5 Gram(s) IV Push once  dextrose 50% Injectable 25 Gram(s) IV Push once  enoxaparin Injectable 40 milliGRAM(s) SubCutaneous every 12 hours  famotidine    Tablet 20 milliGRAM(s) Oral daily  glucagon  Injectable 1 milliGRAM(s) IntraMuscular once  insulin lispro (ADMELOG) corrective regimen sliding scale   SubCutaneous three times a day before meals  losartan 25 milliGRAM(s) Oral daily  metoprolol tartrate 50 milliGRAM(s) Oral two times a day  multivitamin 1 Tablet(s) Oral daily  polyethylene glycol 3350 17 Gram(s) Oral daily  remdesivir  IVPB   IV Intermittent   remdesivir  IVPB 100 milliGRAM(s) IV Intermittent every 24 hours  senna 2 Tablet(s) Oral at bedtime  zinc sulfate 220 milliGRAM(s) Oral daily    MEDICATIONS  (PRN):      LABS:                        14.0   13.00 )-----------( 271      ( 28 Mar 2021 05:00 )             42.2     03-28    139  |  102  |  23  ----------------------------<  92  4.6   |  32<H>  |  1.02    Ca    8.7      28 Mar 2021 05:00  Phos  3.6     03-28  Mg     2.1     03-28    TPro  6.9  /  Alb  2.5<L>  /  TBili  0.6  /  DBili  x   /  AST  42<H>  /  ALT  50<H>  /  AlkPhos  76  03-28    HIT ab -- 03-24 @ 11:32  HIT ab EIA --  D Dimer -298    CARDIAC MARKERS ( 27 Mar 2021 05:00 )  .030 ng/mL / x     / x     / x     / x      CARDIAC MARKERS ( 27 Mar 2021 02:09 )  .030 ng/mL / x     / x     / x     / x      CARDIAC MARKERS ( 26 Mar 2021 18:15 )  .023 ng/mL / x     / x     / x     / x            PT/INR - ( 28 Mar 2021 05:00 )   PT: 14.5 sec;   INR: 1.21 ratio         PTT - ( 28 Mar 2021 05:00 )  PTT:26.3 sec            CULTURES: (if applicable)          CAPILLARY BLOOD GLUCOSE      POCT Blood Glucose.: 86 mg/dL (28 Mar 2021 05:46)      RADIOLOGY  CXR:      CT:    ECHO:      VITALS:  T(C): 36.7 (03-28-21 @ 08:00), Max: 36.8 (03-27-21 @ 16:00)  T(F): 98 (03-28-21 @ 08:00), Max: 98.3 (03-27-21 @ 16:00)  HR: 90 (03-28-21 @ 08:00) (80 - 118)  BP: 135/75 (03-28-21 @ 08:00) (126/76 - 168/104)  BP(mean): 93 (03-28-21 @ 08:00) (78 - 113)  ABP: --  ABP(mean): --  RR: 18 (03-28-21 @ 08:00) (18 - 24)  SpO2: 96% (03-28-21 @ 08:00) (94% - 99%)  CVP(mm Hg): --  CVP(cm H2O): --    Ins and Outs     03-27-21 @ 07:01  -  03-28-21 @ 07:00  --------------------------------------------------------  IN: 720 mL / OUT: 650 mL / NET: 70 mL                I&O's Detail    27 Mar 2021 07:01  -  28 Mar 2021 07:00  --------------------------------------------------------  IN:    Oral Fluid: 720 mL  Total IN: 720 mL    OUT:    Voided (mL): 650 mL  Total OUT: 650 mL    Total NET: 70 mL

## 2021-03-28 NOTE — PROGRESS NOTE ADULT - SUBJECTIVE AND OBJECTIVE BOX
Patient is a 70y old  Male who presents with a chief complaint of covid PNA, hypoxia (28 Mar 2021 11:05)      BRIEF HOSPITAL COURSE: 70y Male  ***    Events last 24 hours: ***    PAST MEDICAL & SURGICAL HISTORY:  Bradycardia    Pacemaker    Pacemaker          Hosp day #4d    Vent day #        Vital signs / Reviewed and Physical Exam Performed where pertinent and urgently required    Lab / Radiology  studies / ABG / Meds -  reviewed and interpreted into the assessment and treatment plan.      Assessment/Plan/Therapeutic interventions      Neuro - Sedation neuromuscular blockade to facilitate safe ventilation    CV -  Pressor support as needed to maintain MAP 65           Avoiding fluid challenges          QTC monitoring while on Azithromycin and Hydroxychloroquine.    Pulm -  ARDS-NET 4-6cc/kg IBW TV as able to maintain plateau pressures <30               Prone ventilation consideration as feasible  Pa02/Fi02 < 150 on Fi02 >60% and PEEP at least 5                 Vent bundle Reviewed     GI -  PPI  Enteric feeds as tolerated in tandem with NMB and prone ventilation    Renal - Even to negative fluid balance as tolerated by hemodynamics and renal fx.  Feeds to be provided in lieu of IVF.     Heme -  Pharmacologic DVT PPx  in addition to SCD's    ID - ABX discontinuation based on discussion with ID in conjunction with clinical features, culture data, and judicious procalcitonin monitoring.      Endo -  Aggressive glycemic control to limit FS glucose to < 180mg/dl.      COVID 19 specific considerations and therapeutic  options based on the available and rapidly changing literature    Goals of care considerations:  Ongoing assessment for patient specific treatment options based on progression or decline.  I have involved the family with updates and requests in guidance for medical decision making.          38  Minutes of critical care team spent in the management of this critically ill COVID-19 Patient / PUI patient with continuous assessments and interventions based on the interpretation of multiple databases.

## 2021-03-29 LAB
ALBUMIN SERPL ELPH-MCNC: 2.3 G/DL — LOW (ref 3.3–5)
ALBUMIN SERPL ELPH-MCNC: 2.3 G/DL — LOW (ref 3.3–5)
ALP SERPL-CCNC: 76 U/L — SIGNIFICANT CHANGE UP (ref 40–120)
ALP SERPL-CCNC: 76 U/L — SIGNIFICANT CHANGE UP (ref 40–120)
ALT FLD-CCNC: 57 U/L — HIGH (ref 10–45)
ALT FLD-CCNC: 58 U/L — HIGH (ref 10–45)
ANION GAP SERPL CALC-SCNC: 8 MMOL/L — SIGNIFICANT CHANGE UP (ref 5–17)
APTT BLD: 29.9 SEC — SIGNIFICANT CHANGE UP (ref 27.5–35.5)
AST SERPL-CCNC: 38 U/L — SIGNIFICANT CHANGE UP (ref 10–40)
AST SERPL-CCNC: 44 U/L — HIGH (ref 10–40)
BILIRUB DIRECT SERPL-MCNC: 0 MG/DL — SIGNIFICANT CHANGE UP (ref 0–0.2)
BILIRUB INDIRECT FLD-MCNC: 0.6 MG/DL — SIGNIFICANT CHANGE UP (ref 0.2–1)
BILIRUB SERPL-MCNC: 0.6 MG/DL — SIGNIFICANT CHANGE UP (ref 0.2–1.2)
BILIRUB SERPL-MCNC: 0.6 MG/DL — SIGNIFICANT CHANGE UP (ref 0.2–1.2)
BUN SERPL-MCNC: 32 MG/DL — HIGH (ref 7–23)
CALCIUM SERPL-MCNC: 8.7 MG/DL — SIGNIFICANT CHANGE UP (ref 8.4–10.5)
CHLORIDE SERPL-SCNC: 103 MMOL/L — SIGNIFICANT CHANGE UP (ref 96–108)
CO2 SERPL-SCNC: 30 MMOL/L — SIGNIFICANT CHANGE UP (ref 22–31)
CREAT SERPL-MCNC: 1.15 MG/DL — SIGNIFICANT CHANGE UP (ref 0.5–1.3)
CREAT SERPL-MCNC: 1.22 MG/DL — SIGNIFICANT CHANGE UP (ref 0.5–1.3)
GLUCOSE SERPL-MCNC: 106 MG/DL — HIGH (ref 70–99)
HCT VFR BLD CALC: 42.9 % — SIGNIFICANT CHANGE UP (ref 39–50)
HGB BLD-MCNC: 13.8 G/DL — SIGNIFICANT CHANGE UP (ref 13–17)
INR BLD: 1.92 RATIO — HIGH (ref 0.88–1.16)
MAGNESIUM SERPL-MCNC: 2.2 MG/DL — SIGNIFICANT CHANGE UP (ref 1.6–2.6)
MCHC RBC-ENTMCNC: 29.5 PG — SIGNIFICANT CHANGE UP (ref 27–34)
MCHC RBC-ENTMCNC: 32.2 GM/DL — SIGNIFICANT CHANGE UP (ref 32–36)
MCV RBC AUTO: 91.7 FL — SIGNIFICANT CHANGE UP (ref 80–100)
NRBC # BLD: 0 /100 WBCS — SIGNIFICANT CHANGE UP (ref 0–0)
PHOSPHATE SERPL-MCNC: 4 MG/DL — SIGNIFICANT CHANGE UP (ref 2.5–4.5)
PLATELET # BLD AUTO: 300 K/UL — SIGNIFICANT CHANGE UP (ref 150–400)
POTASSIUM SERPL-MCNC: 4.8 MMOL/L — SIGNIFICANT CHANGE UP (ref 3.5–5.3)
POTASSIUM SERPL-SCNC: 4.8 MMOL/L — SIGNIFICANT CHANGE UP (ref 3.5–5.3)
PROT SERPL-MCNC: 6.6 G/DL — SIGNIFICANT CHANGE UP (ref 6–8.3)
PROT SERPL-MCNC: 6.6 G/DL — SIGNIFICANT CHANGE UP (ref 6–8.3)
PROTHROM AB SERPL-ACNC: 22.5 SEC — HIGH (ref 10.6–13.6)
RBC # BLD: 4.68 M/UL — SIGNIFICANT CHANGE UP (ref 4.2–5.8)
RBC # FLD: 13.8 % — SIGNIFICANT CHANGE UP (ref 10.3–14.5)
SODIUM SERPL-SCNC: 141 MMOL/L — SIGNIFICANT CHANGE UP (ref 135–145)
WBC # BLD: 9.19 K/UL — SIGNIFICANT CHANGE UP (ref 3.8–10.5)
WBC # FLD AUTO: 9.19 K/UL — SIGNIFICANT CHANGE UP (ref 3.8–10.5)

## 2021-03-29 PROCEDURE — 99233 SBSQ HOSP IP/OBS HIGH 50: CPT | Mod: CS

## 2021-03-29 RX ADMIN — Medication 50 MILLIGRAM(S): at 06:50

## 2021-03-29 RX ADMIN — FAMOTIDINE 20 MILLIGRAM(S): 10 INJECTION INTRAVENOUS at 11:31

## 2021-03-29 RX ADMIN — Medication 1 TABLET(S): at 12:04

## 2021-03-29 RX ADMIN — Medication 500 MILLIGRAM(S): at 11:30

## 2021-03-29 RX ADMIN — ATORVASTATIN CALCIUM 20 MILLIGRAM(S): 80 TABLET, FILM COATED ORAL at 22:25

## 2021-03-29 RX ADMIN — AMIODARONE HYDROCHLORIDE 400 MILLIGRAM(S): 400 TABLET ORAL at 16:13

## 2021-03-29 RX ADMIN — LOSARTAN POTASSIUM 25 MILLIGRAM(S): 100 TABLET, FILM COATED ORAL at 06:50

## 2021-03-29 RX ADMIN — Medication 81 MILLIGRAM(S): at 11:31

## 2021-03-29 RX ADMIN — ZINC SULFATE TAB 220 MG (50 MG ZINC EQUIVALENT) 220 MILLIGRAM(S): 220 (50 ZN) TAB at 11:31

## 2021-03-29 RX ADMIN — POLYETHYLENE GLYCOL 3350 17 GRAM(S): 17 POWDER, FOR SOLUTION ORAL at 11:30

## 2021-03-29 RX ADMIN — REMDESIVIR 500 MILLIGRAM(S): 5 INJECTION INTRAVENOUS at 06:51

## 2021-03-29 RX ADMIN — Medication 4000 UNIT(S): at 11:30

## 2021-03-29 RX ADMIN — RIVAROXABAN 20 MILLIGRAM(S): KIT at 16:13

## 2021-03-29 RX ADMIN — Medication 6 MILLIGRAM(S): at 06:50

## 2021-03-29 RX ADMIN — AMIODARONE HYDROCHLORIDE 400 MILLIGRAM(S): 400 TABLET ORAL at 22:25

## 2021-03-29 RX ADMIN — AMIODARONE HYDROCHLORIDE 400 MILLIGRAM(S): 400 TABLET ORAL at 06:50

## 2021-03-29 NOTE — PROGRESS NOTE ADULT - SUBJECTIVE AND OBJECTIVE BOX
Follow-up Critical Care Progress Note  Chief Complaint : Hypoxemia    Awake and alert. Feels a bit better. Able to titrate down oxygen support.     Allergies :No Known Allergies      PAST MEDICAL & SURGICAL HISTORY:  Bradycardia    Pacemaker    Pacemaker  Medications:  MEDICATIONS  (STANDING):  aMIOdarone    Tablet   Oral   aMIOdarone    Tablet 400 milliGRAM(s) Oral every 8 hours  ascorbic acid 500 milliGRAM(s) Oral daily  aspirin  chewable 81 milliGRAM(s) Oral daily  atorvastatin 20 milliGRAM(s) Oral at bedtime  cholecalciferol 4000 Unit(s) Oral daily  dexAMETHasone     Tablet 6 milliGRAM(s) Oral daily  dextrose 40% Gel 15 Gram(s) Oral once  dextrose 5%. 1000 milliLiter(s) (50 mL/Hr) IV Continuous <Continuous>  dextrose 5%. 1000 milliLiter(s) (100 mL/Hr) IV Continuous <Continuous>  dextrose 50% Injectable 25 Gram(s) IV Push once  dextrose 50% Injectable 12.5 Gram(s) IV Push once  dextrose 50% Injectable 25 Gram(s) IV Push once  famotidine    Tablet 20 milliGRAM(s) Oral daily  glucagon  Injectable 1 milliGRAM(s) IntraMuscular once  losartan 25 milliGRAM(s) Oral daily  metoprolol tartrate 50 milliGRAM(s) Oral two times a day  multivitamin 1 Tablet(s) Oral daily  polyethylene glycol 3350 17 Gram(s) Oral daily  rivaroxaban 20 milliGRAM(s) Oral with dinner  senna 2 Tablet(s) Oral at bedtime  zinc sulfate 220 milliGRAM(s) Oral daily    MEDICATIONS  (PRN):      LABS:                        13.8   9.19  )-----------( 300      ( 29 Mar 2021 05:45 )             42.9     03-29    141  |  103  |  32<H>  ----------------------------<  106<H>  4.8   |  30  |  1.15    Ca    8.7      29 Mar 2021 05:45  Phos  4.0     03-29  Mg     2.2     03-29    TPro  6.6  /  Alb  2.3<L>  /  TBili  0.6  /  DBili  0.0  /  AST  44<H>  /  ALT  57<H>  /  AlkPhos  76  03-29    HIT ab -- 03-24 @ 11:32  HIT ab EIA --  D Dimer -298          PT/INR - ( 29 Mar 2021 05:45 )   PT: 22.5 sec;   INR: 1.92 ratio         PTT - ( 29 Mar 2021 05:45 )  PTT:29.9 sec    VITALS:  T(C): 36.7 (03-29-21 @ 12:00), Max: 36.7 (03-28-21 @ 16:00)  T(F): 98 (03-29-21 @ 12:00), Max: 98.1 (03-29-21 @ 08:00)  HR: 55 (03-29-21 @ 12:00) (55 - 100)  BP: 118/56 (03-29-21 @ 12:00) (118/56 - 146/67)  BP(mean): 76 (03-29-21 @ 12:00) (76 - 100)  ABP: --  ABP(mean): --  RR: 20 (03-29-21 @ 12:00) (18 - 20)  SpO2: 90% (03-29-21 @ 12:00) (90% - 96%)  CVP(mm Hg): --  CVP(cm H2O): --    Ins and Outs     03-28-21 @ 07:01  -  03-29-21 @ 07:00  --------------------------------------------------------  IN: 0 mL / OUT: 1550 mL / NET: -1550 mL    03-29-21 @ 07:01  -  03-29-21 @ 14:18  --------------------------------------------------------  IN: 380 mL / OUT: 400 mL / NET: -20 mL    I&O's Detail    28 Mar 2021 07:01  -  29 Mar 2021 07:00  --------------------------------------------------------  IN:  Total IN: 0 mL    OUT:    Voided (mL): 1550 mL  Total OUT: 1550 mL    Total NET: -1550 mL      29 Mar 2021 07:01  -  29 Mar 2021 14:18  --------------------------------------------------------  IN:    Oral Fluid: 380 mL  Total IN: 380 mL    OUT:    Voided (mL): 400 mL  Total OUT: 400 mL    Total NET: -20 mL

## 2021-03-29 NOTE — PROGRESS NOTE ADULT - SUBJECTIVE AND OBJECTIVE BOX
Follow up for vt  SUBJ: patient with severe bilateral Covid pna and wct ?af appears comfortable but still with difficulty with 02 SAT  PMH  Bradycardia    Pacemaker    No pertinent past medical history        MEDICATIONS  (STANDING):  aMIOdarone    Tablet   Oral   aMIOdarone    Tablet 400 milliGRAM(s) Oral every 8 hours  ascorbic acid 500 milliGRAM(s) Oral daily  aspirin  chewable 81 milliGRAM(s) Oral daily  atorvastatin 20 milliGRAM(s) Oral at bedtime  cholecalciferol 4000 Unit(s) Oral daily  dexAMETHasone     Tablet 6 milliGRAM(s) Oral daily  dextrose 40% Gel 15 Gram(s) Oral once  dextrose 5%. 1000 milliLiter(s) (50 mL/Hr) IV Continuous <Continuous>  dextrose 5%. 1000 milliLiter(s) (100 mL/Hr) IV Continuous <Continuous>  dextrose 50% Injectable 25 Gram(s) IV Push once  dextrose 50% Injectable 12.5 Gram(s) IV Push once  dextrose 50% Injectable 25 Gram(s) IV Push once  famotidine    Tablet 20 milliGRAM(s) Oral daily  glucagon  Injectable 1 milliGRAM(s) IntraMuscular once  losartan 25 milliGRAM(s) Oral daily  metoprolol tartrate 50 milliGRAM(s) Oral two times a day  multivitamin 1 Tablet(s) Oral daily  polyethylene glycol 3350 17 Gram(s) Oral daily  rivaroxaban 20 milliGRAM(s) Oral with dinner  senna 2 Tablet(s) Oral at bedtime  zinc sulfate 220 milliGRAM(s) Oral daily    MEDICATIONS  (PRN):        PHYSICAL EXAM:  Vital Signs Last 24 Hrs  T(C): 36.7 (29 Mar 2021 08:00), Max: 36.7 (28 Mar 2021 16:00)  T(F): 98.1 (29 Mar 2021 08:00), Max: 98.1 (29 Mar 2021 08:00)  HR: 59 (29 Mar 2021 08:00) (59 - 100)  BP: 125/63 (29 Mar 2021 08:00) (120/75 - 146/67)  BP(mean): 78 (29 Mar 2021 08:00) (78 - 100)  RR: 18 (29 Mar 2021 08:00) (18 - 18)  SpO2: 90% (29 Mar 2021 08:00) (90% - 96%)    GENERAL: NAD, well-groomed, well-developed        TELEMETRY: NOW SB    ECG:    LABS:                        13.8   9.19  )-----------( 300      ( 29 Mar 2021 05:45 )             42.9     03-29    141  |  103  |  32<H>  ----------------------------<  106<H>  4.8   |  30  |  1.15    Ca    8.7      29 Mar 2021 05:45  Phos  4.0     03-29  Mg     2.2     03-29    TPro  6.6  /  Alb  2.3<L>  /  TBili  0.6  /  DBili  0.0  /  AST  44<H>  /  ALT  57<H>  /  AlkPhos  76  03-29        PT/INR - ( 29 Mar 2021 05:45 )   PT: 22.5 sec;   INR: 1.92 ratio         PTT - ( 29 Mar 2021 05:45 )  PTT:29.9 sec    I&O's Summary    28 Mar 2021 07:01  -  29 Mar 2021 07:00  --------------------------------------------------------  IN: 0 mL / OUT: 1550 mL / NET: -1550 mL    29 Mar 2021 07:01  -  29 Mar 2021 11:41  --------------------------------------------------------  IN: 200 mL / OUT: 200 mL / NET: 0 mL      BNP    RADIOLOGY & ADDITIONAL STUDIES:    ECHO:

## 2021-03-30 ENCOUNTER — TRANSCRIPTION ENCOUNTER (OUTPATIENT)
Age: 71
End: 2021-03-30

## 2021-03-30 LAB
ALBUMIN SERPL ELPH-MCNC: 2.6 G/DL — LOW (ref 3.3–5)
ALP SERPL-CCNC: 84 U/L — SIGNIFICANT CHANGE UP (ref 40–120)
ALT FLD-CCNC: 66 U/L — HIGH (ref 10–45)
AST SERPL-CCNC: 24 U/L — SIGNIFICANT CHANGE UP (ref 10–40)
BILIRUB DIRECT SERPL-MCNC: 0.1 MG/DL — SIGNIFICANT CHANGE UP (ref 0–0.2)
BILIRUB INDIRECT FLD-MCNC: 0.4 MG/DL — SIGNIFICANT CHANGE UP (ref 0.2–1)
BILIRUB SERPL-MCNC: 0.5 MG/DL — SIGNIFICANT CHANGE UP (ref 0.2–1.2)
CREAT SERPL-MCNC: 1.23 MG/DL — SIGNIFICANT CHANGE UP (ref 0.5–1.3)
INR BLD: 1.79 RATIO — HIGH (ref 0.88–1.16)
PROT SERPL-MCNC: 6.9 G/DL — SIGNIFICANT CHANGE UP (ref 6–8.3)
PROTHROM AB SERPL-ACNC: 21.1 SEC — HIGH (ref 10.6–13.6)

## 2021-03-30 PROCEDURE — 99233 SBSQ HOSP IP/OBS HIGH 50: CPT | Mod: CS

## 2021-03-30 PROCEDURE — 99233 SBSQ HOSP IP/OBS HIGH 50: CPT

## 2021-03-30 RX ORDER — REGADENOSON 0.08 MG/ML
0.4 INJECTION, SOLUTION INTRAVENOUS ONCE
Refills: 0 | Status: COMPLETED | OUTPATIENT
Start: 2021-03-30 | End: 2021-03-31

## 2021-03-30 RX ORDER — FAMOTIDINE 10 MG/ML
1 INJECTION INTRAVENOUS
Qty: 10 | Refills: 0
Start: 2021-03-30 | End: 2021-04-08

## 2021-03-30 RX ORDER — AMIODARONE HYDROCHLORIDE 400 MG/1
1 TABLET ORAL
Qty: 30 | Refills: 0
Start: 2021-03-30 | End: 2021-04-28

## 2021-03-30 RX ORDER — LOSARTAN POTASSIUM 100 MG/1
1 TABLET, FILM COATED ORAL
Qty: 30 | Refills: 0
Start: 2021-03-30 | End: 2021-04-28

## 2021-03-30 RX ORDER — CHOLECALCIFEROL (VITAMIN D3) 125 MCG
4000 CAPSULE ORAL
Qty: 0 | Refills: 0 | DISCHARGE
Start: 2021-03-30

## 2021-03-30 RX ORDER — RIVAROXABAN 15 MG-20MG
1 KIT ORAL
Qty: 30 | Refills: 0
Start: 2021-03-30 | End: 2021-04-28

## 2021-03-30 RX ORDER — ASCORBIC ACID 60 MG
1 TABLET,CHEWABLE ORAL
Qty: 0 | Refills: 0 | DISCHARGE
Start: 2021-03-30

## 2021-03-30 RX ORDER — METOPROLOL TARTRATE 50 MG
1 TABLET ORAL
Qty: 60 | Refills: 0
Start: 2021-03-30 | End: 2021-04-28

## 2021-03-30 RX ORDER — ATORVASTATIN CALCIUM 80 MG/1
1 TABLET, FILM COATED ORAL
Qty: 30 | Refills: 0
Start: 2021-03-30 | End: 2021-04-28

## 2021-03-30 RX ORDER — DEXAMETHASONE 0.5 MG/5ML
1 ELIXIR ORAL
Qty: 4 | Refills: 0
Start: 2021-03-30 | End: 2021-04-02

## 2021-03-30 RX ORDER — ASPIRIN/CALCIUM CARB/MAGNESIUM 324 MG
1 TABLET ORAL
Qty: 30 | Refills: 0
Start: 2021-03-30 | End: 2021-04-28

## 2021-03-30 RX ADMIN — LOSARTAN POTASSIUM 25 MILLIGRAM(S): 100 TABLET, FILM COATED ORAL at 06:31

## 2021-03-30 RX ADMIN — Medication 500 MILLIGRAM(S): at 13:06

## 2021-03-30 RX ADMIN — ATORVASTATIN CALCIUM 20 MILLIGRAM(S): 80 TABLET, FILM COATED ORAL at 21:35

## 2021-03-30 RX ADMIN — AMIODARONE HYDROCHLORIDE 400 MILLIGRAM(S): 400 TABLET ORAL at 16:14

## 2021-03-30 RX ADMIN — Medication 50 MILLIGRAM(S): at 17:59

## 2021-03-30 RX ADMIN — AMIODARONE HYDROCHLORIDE 400 MILLIGRAM(S): 400 TABLET ORAL at 21:35

## 2021-03-30 RX ADMIN — Medication 1 TABLET(S): at 13:06

## 2021-03-30 RX ADMIN — Medication 50 MILLIGRAM(S): at 06:31

## 2021-03-30 RX ADMIN — RIVAROXABAN 20 MILLIGRAM(S): KIT at 17:59

## 2021-03-30 RX ADMIN — Medication 81 MILLIGRAM(S): at 13:06

## 2021-03-30 RX ADMIN — Medication 6 MILLIGRAM(S): at 06:31

## 2021-03-30 RX ADMIN — ZINC SULFATE TAB 220 MG (50 MG ZINC EQUIVALENT) 220 MILLIGRAM(S): 220 (50 ZN) TAB at 13:06

## 2021-03-30 RX ADMIN — AMIODARONE HYDROCHLORIDE 400 MILLIGRAM(S): 400 TABLET ORAL at 06:31

## 2021-03-30 RX ADMIN — Medication 4000 UNIT(S): at 13:06

## 2021-03-30 RX ADMIN — FAMOTIDINE 20 MILLIGRAM(S): 10 INJECTION INTRAVENOUS at 13:06

## 2021-03-30 NOTE — PROGRESS NOTE ADULT - SUBJECTIVE AND OBJECTIVE BOX
Patient is a 70y old  Male who presents with a chief complaint of covid PNA, hypoxia (29 Mar 2021 14:18) Eager to go home. No acute issues overnight       Patient seen and examined at bedside.    ALLERGIES:  No Known Allergies    MEDICATIONS  (STANDING):  aMIOdarone    Tablet   Oral   aMIOdarone    Tablet 400 milliGRAM(s) Oral every 8 hours  ascorbic acid 500 milliGRAM(s) Oral daily  aspirin  chewable 81 milliGRAM(s) Oral daily  atorvastatin 20 milliGRAM(s) Oral at bedtime  cholecalciferol 4000 Unit(s) Oral daily  dexAMETHasone     Tablet 6 milliGRAM(s) Oral daily  dextrose 40% Gel 15 Gram(s) Oral once  dextrose 5%. 1000 milliLiter(s) (50 mL/Hr) IV Continuous <Continuous>  dextrose 5%. 1000 milliLiter(s) (100 mL/Hr) IV Continuous <Continuous>  dextrose 50% Injectable 25 Gram(s) IV Push once  dextrose 50% Injectable 12.5 Gram(s) IV Push once  dextrose 50% Injectable 25 Gram(s) IV Push once  famotidine    Tablet 20 milliGRAM(s) Oral daily  glucagon  Injectable 1 milliGRAM(s) IntraMuscular once  losartan 25 milliGRAM(s) Oral daily  metoprolol tartrate 50 milliGRAM(s) Oral two times a day  multivitamin 1 Tablet(s) Oral daily  polyethylene glycol 3350 17 Gram(s) Oral daily  rivaroxaban 20 milliGRAM(s) Oral with dinner  senna 2 Tablet(s) Oral at bedtime  zinc sulfate 220 milliGRAM(s) Oral daily    MEDICATIONS  (PRN):    Vital Signs Last 24 Hrs  T(F): 97.3 (30 Mar 2021 08:01), Max: 98 (29 Mar 2021 12:00)  HR: 60 (30 Mar 2021 11:35) (53 - 60)  BP: 151/75 (30 Mar 2021 11:35) (111/56 - 159/89)  RR: 18 (30 Mar 2021 11:35) (15 - 20)  SpO2: 95% (30 Mar 2021 11:35) (89% - 99%)  I&O's Summary    29 Mar 2021 07:01  -  30 Mar 2021 07:00  --------------------------------------------------------  IN: 560 mL / OUT: 1200 mL / NET: -640 mL    30 Mar 2021 07:01  -  30 Mar 2021 11:52  --------------------------------------------------------  IN: 320 mL / OUT: 300 mL / NET: 20 mL        PHYSICAL EXAM:  General: NAD, A/O x 3  ENT: MMM, no thrush  Neck: Supple, No JVD  Lungs: Non labored breathing,  Clear to auscultation bilaterally,   Cardio: RRR, S1/S2, No murmurs, no pitting edema bilaterally  Abdomen: Soft, obese Nontender, Nondistended; Bowel sounds present  Extremities: No calf tenderness, moves all extremities    LABS:                        13.8   9.19  )-----------( 300      ( 29 Mar 2021 05:45 )             42.9       03-30    x   |  x   |  x   ----------------------------<  x   x    |  x   |  1.23    Ca    8.7      29 Mar 2021 05:45  Phos  4.0     03-29  Mg     2.2     03-29    TPro  6.9  /  Alb  2.6  /  TBili  0.5  /  DBili  0.1  /  AST  24  /  ALT  66  /  AlkPhos  84  03-30     eGFR if Non African American: 60 mL/min/1.73M2 (03-30-21 @ 06:56)  eGFR if : 69 mL/min/1.73M2 (03-30-21 @ 06:56)    PT/INR - ( 30 Mar 2021 06:56 )   PT: 21.1 sec;   INR: 1.79 ratio         PTT - ( 29 Mar 2021 05:45 )  PTT:29.9 sec                                 RADIOLOGY & ADDITIONAL TESTS:    Care Discussed with Consultants/Other Providers:

## 2021-03-30 NOTE — PHYSICAL THERAPY INITIAL EVALUATION ADULT - GENERAL OBSERVATIONS, REHAB EVAL
Pt received seated in recliner chair, 1L of O2 via NC, +tele, tray in front. Pt received seated in recliner chair, 1L of O2 via NC, +pacemaker, tray in front.

## 2021-03-30 NOTE — DISCHARGE NOTE PROVIDER - CARE PROVIDER_API CALL
Cheri Elizabeth)  Family Medicine  [address] [place] [address]  Phone: [phone]  Fax: [phone]  Follow Up Time:

## 2021-03-30 NOTE — DISCHARGE NOTE PROVIDER - HOSPITAL COURSE
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 He along with his entire family was covid positive. he tested positive 1 week ago. Pt reports seen by pmd last week and was given prednisone, zpack, tensilon catrachito and inhaler which he has completed. but did not help. His O2 sat went down to 89% yesterday then mid 90s.  In ER his VS is table. O2 sat was 95% at RA. CXR showed b/l PNA. wbc normal. procal 0.11. LFT and Dddimer elevated. covid +.   Admitted to medicine service for acute hypoxic respiratory failure due to Covid 19 PNA. Started on IV remdemsivir and decadron.  Given supplemental oxygen via NC.  Hospital course c/b new onset atrial fib and V tach.  Transferred to ICU for higher level of monitoring.  Cardiology consulted.  Rate controlled with metoprolol and loaded with amiodarone.  [id number] score 3. Started on xarelto.  Transferred out of ICU and onto medical floor on 3/29.  Underwent nuclear stress test for ischemic workup which revealed __________    Medically cleared for discharge home.  Patient satting 95% on room air at time of discharge. Will take PO dexamethasone until 4/3    Medications were delivered to patient via meds to bed with [facility] 70 yr old male with PMHx of bradycardia s/p pacemaker, p/w cough, and shortness of breath for 2 days, worse at night and unable to sleep.  He along with his entire family was covid positive. he tested positive 1 week ago.  Pt reports seen by pmd last week and was given prednisone, zpack, tessilon pearles and inhaler which he has completed, but did not help. His O2 sat went down to 89% in the ED, CXR showed b/l PNA. wbc normal. procal 0.11. LFT and Ddimer elevated. covid +.   Admitted to medicine service for acute hypoxic respiratory failure due to Covid 19 PNA. Started on IV remdemsivir and decadron.  Given supplemental oxygen via NC.  Hospital course c/b new onset atrial fib and V tach.  Transferred to ICU for higher level of monitoring.  Cardiology consulted.  Rate controlled with metoprolol and loaded with amiodarone.  [id number] score 3. Started on xarelto.  Transferred out of ICU and onto medical floor on 3/29.  He had a 2 part nuclear stress test for ischemic workup which revealed anterolateral ischemia and TID dilatation.  Recommendation is for Cardiac Cath.  Due to +COVID status, can be done as outpatient.  Per conversation with his PMD, Dr. Elizabeth pt will go to office on Monday, 4/5/21 for COVID pcr test.  He will f/u with [facility] own Cardiologist for referral for Cath.    Discharge Provider:  CHIKIS Garrido [phone]  Outpatient Provider Dr. Elizabeth was notified of discharge.    Medications were delivered to patient via meds to bed with [facility].

## 2021-03-30 NOTE — PROGRESS NOTE ADULT - SUBJECTIVE AND OBJECTIVE BOX
Follow up for Ventricular tachycardia  SUBJ:    No new complaints offered    [facility]  No pertinent past medical history    Pacemaker    Bradycardia        MEDICATIONS  (STANDING):  aMIOdarone    Tablet   Oral   aMIOdarone    Tablet 400 milliGRAM(s) Oral every 8 hours  ascorbic acid 500 milliGRAM(s) Oral daily  aspirin  chewable 81 milliGRAM(s) Oral daily  atorvastatin 20 milliGRAM(s) Oral at bedtime  cholecalciferol 4000 Unit(s) Oral daily  dexAMETHasone     Tablet 6 milliGRAM(s) Oral daily  dextrose 40% Gel 15 Gram(s) Oral once  dextrose 5%. 1000 milliLiter(s) (50 mL/Hr) IV Continuous <Continuous>  dextrose 5%. 1000 milliLiter(s) (100 mL/Hr) IV Continuous <Continuous>  dextrose 50% Injectable 25 Gram(s) IV Push once  dextrose 50% Injectable 12.5 Gram(s) IV Push once  dextrose 50% Injectable 25 Gram(s) IV Push once  famotidine    Tablet 20 milliGRAM(s) Oral daily  glucagon  Injectable 1 milliGRAM(s) IntraMuscular once  losartan 25 milliGRAM(s) Oral daily  metoprolol tartrate 50 milliGRAM(s) Oral two times a day  multivitamin 1 Tablet(s) Oral daily  polyethylene glycol 3350 17 Gram(s) Oral daily  regadenoson Injectable 0.4 milliGRAM(s) IV Push once  rivaroxaban 20 milliGRAM(s) Oral with dinner  senna 2 Tablet(s) Oral at bedtime  zinc sulfate 220 milliGRAM(s) Oral daily    MEDICATIONS  (PRN):        PHYSICAL EXAM:  Vital Signs Last 24 Hrs  T(C): 36.7 (30 Mar 2021 14:00), Max: 36.7 (30 Mar 2021 14:00)  T(F): 98 (30 Mar 2021 14:00), Max: 98 (30 Mar 2021 14:00)  HR: 62 (30 Mar 2021 14:00) (54 - 62)  BP: 154/89 (30 Mar 2021 14:00) (126/72 - 159/89)  BP(mean): --  RR: 18 (30 Mar 2021 14:00) (18 - 20)  SpO2: 96% (30 Mar 2021 14:00) (89% - 96%)    GENERAL: NAD, well-groomed, well-developed  HEAD:  Atraumatic, Normocephalic  EYES: EOMI, PERRLA, conjunctiva and sclera clear  ENT: Moist mucous membranes,  NECK: Supple, No JVD, no bruits  CHEST/LUNG: Clear to percussion bilaterally; No rales, rhonchi, wheezing, or rubs  HEART: Regular rate and rhythm; No murmurs, rubs, or gallops PMI non displaced.  ABDOMEN: Soft, Nontender, Nondistended; Bowel sounds present  EXTREMITIES:  2+ Peripheral Pulses, No clubbing, cyanosis, or edema  SKIN: No rashes or lesions  NERVOUS SYSTEM:  Cranial Nerves II-XII intact      TELEMETRY:    RSR    ECG:    < from: 12 Lead ECG (03.28.21 @ 11:40) >  Diagnosis Line Atrial fibrillation with occasional ventricular-paced complexesand with premature ventricular or aberrantly conducted complexes      Confirmed by ARNOL CORTEZ MD ([id number]) on 3/29/2021 9:15:36 AM    < end of copied text >    ECHO:    LABS:                        13.8   9.19  )-----------( 300      ( 29 Mar 2021 05:45 )             42.9     03-30    x   |  x   |  x   ----------------------------<  x   x    |  x   |  1.23    Ca    8.7      29 Mar 2021 05:45  Phos  4.0     03-29  Mg     2.2     03-29    TPro  6.9  /  Alb  2.6<L>  /  TBili  0.5  /  DBili  0.1  /  AST  24  /  ALT  66<H>  /  AlkPhos  84  03-30        PT/INR - ( 30 Mar 2021 06:56 )   PT: 21.1 sec;   INR: 1.79 ratio         PTT - ( 29 Mar 2021 05:45 )  PTT:29.9 sec    I&O's Summary    29 Mar 2021 07:01  -  30 Mar 2021 07:00  --------------------------------------------------------  IN: 560 mL / OUT: 1200 mL / NET: -640 mL    30 Mar 2021 07:01  -  30 Mar 2021 18:36  --------------------------------------------------------  IN: 720 mL / OUT: 700 mL / NET: 20 mL      BNP    RADIOLOGY & ADDITIONAL STUDIES:    ECHO:

## 2021-03-30 NOTE — DISCHARGE NOTE PROVIDER - NSDCMRMEDTOKEN_GEN_ALL_CORE_FT
amiodarone 200 mg oral tablet: 1 tab(s) orally once a day   ascorbic acid 500 mg oral tablet: 1 tab(s) orally once a day  aspirin 81 mg oral tablet, chewable: 1 tab(s) orally once a day  atorvastatin 20 mg oral tablet: 1 tab(s) orally once a day (at bedtime)  cholecalciferol oral tablet: 4000 unit(s) orally once a day  dexamethasone 6 mg oral tablet: 1 tab(s) orally once a day stop on 4/3  famotidine 20 mg oral tablet: 1 tab(s) orally once a day  losartan 25 mg oral tablet: 1 tab(s) orally once a day  metoprolol tartrate 50 mg oral tablet: 1 tab(s) orally 2 times a day  Multiple Vitamins oral tablet: 1 tab(s) orally once a day  rivaroxaban 20 mg oral tablet: 1 tab(s) orally once a day

## 2021-03-30 NOTE — DISCHARGE NOTE PROVIDER - NSDCCPCAREPLAN_GEN_ALL_CORE_FT
PRINCIPAL DISCHARGE DIAGNOSIS  Diagnosis: Hypoxia  Assessment and Plan of Treatment: due to Covid 19  Treated with remdemsivir and dexamethasone  You will continue dexamethasone until 4/3  Your oxygen levels were 95% on room air at time of discharge  You should quarantine at home until 10-14 days after initial Covid positive test      SECONDARY DISCHARGE DIAGNOSES  Diagnosis: COVID-19  Assessment and Plan of Treatment:     Diagnosis: Pneumonia  Assessment and Plan of Treatment:      PRINCIPAL DISCHARGE DIAGNOSIS  Diagnosis: Hypoxia  Assessment and Plan of Treatment: due to Covid 19  Treated with remdemsivir and dexamethasone  You will continue dexamethasone until 4/3  Your oxygen levels were 95% on room air at time of discharge  You should quarantine at home until 10-14 days after initial Covid positive test      SECONDARY DISCHARGE DIAGNOSES  Diagnosis: Atrial fibrillation  Assessment and Plan of Treatment: continue to take anticoagulant as prescribed:  Rivaroxaban  you were also prescribed medication for elevated heart rate:  Amiodarone and Lopressor  follow up with your Cardiologist as outpatient in 1 week    Diagnosis: Cardiac ischemia  Assessment and Plan of Treatment: you had an abnormal stress test, you will need a Cardiac Cath./Angiogram to evaluate your coronary arteries when you test negative for COVID

## 2021-03-30 NOTE — PROGRESS NOTE ADULT - SUBJECTIVE AND OBJECTIVE BOX
Follow-up Critical Care Progress Note  Chief Complaint : Hypoxemia        No new events overnight.  Denies SOB/CP. Ambulating at bedside with out oxygen support.     Allergies :No Known Allergies      PAST MEDICAL & SURGICAL HISTORY:  Pacemaker    Bradycardia    Pacemaker        Medications:  MEDICATIONS  (STANDING):  aMIOdarone    Tablet   Oral   aMIOdarone    Tablet 400 milliGRAM(s) Oral every 8 hours  ascorbic acid 500 milliGRAM(s) Oral daily  aspirin  chewable 81 milliGRAM(s) Oral daily  atorvastatin 20 milliGRAM(s) Oral at bedtime  cholecalciferol 4000 Unit(s) Oral daily  dexAMETHasone     Tablet 6 milliGRAM(s) Oral daily  dextrose 40% Gel 15 Gram(s) Oral once  dextrose 5%. 1000 milliLiter(s) (50 mL/Hr) IV Continuous <Continuous>  dextrose 5%. 1000 milliLiter(s) (100 mL/Hr) IV Continuous <Continuous>  dextrose 50% Injectable 25 Gram(s) IV Push once  dextrose 50% Injectable 12.5 Gram(s) IV Push once  dextrose 50% Injectable 25 Gram(s) IV Push once  famotidine    Tablet 20 milliGRAM(s) Oral daily  glucagon  Injectable 1 milliGRAM(s) IntraMuscular once  losartan 25 milliGRAM(s) Oral daily  metoprolol tartrate 50 milliGRAM(s) Oral two times a day  multivitamin 1 Tablet(s) Oral daily  polyethylene glycol 3350 17 Gram(s) Oral daily  regadenoson Injectable 0.4 milliGRAM(s) IV Push once  rivaroxaban 20 milliGRAM(s) Oral with dinner  senna 2 Tablet(s) Oral at bedtime  zinc sulfate 220 milliGRAM(s) Oral daily    MEDICATIONS  (PRN):      LABS:                        13.8   9.19  )-----------( 300      ( 29 Mar 2021 05:45 )             42.9     03-30    x   |  x   |  x   ----------------------------<  x   x    |  x   |  1.23    Ca    8.7      29 Mar 2021 05:45  Phos  4.0     03-29  Mg     2.2     03-29    TPro  6.9  /  Alb  2.6<L>  /  TBili  0.5  /  DBili  0.1  /  AST  24  /  ALT  66<H>  /  AlkPhos  84  03-30    HIT ab -- 03-24 @ 11:32  HIT ab EIA --  D Dimer -298          PT/INR - ( 30 Mar 2021 06:56 )   PT: 21.1 sec;   INR: 1.79 ratio         PTT - ( 29 Mar 2021 05:45 )  PTT:29.9 sec            CULTURES: (if applicable)          CAPILLARY BLOOD GLUCOSE          RADIOLOGY  CXR:      CT:    ECHO:      VITALS:  T(C): 36.7 (03-30-21 @ 14:00), Max: 36.7 (03-30-21 @ 14:00)  T(F): 98 (03-30-21 @ 14:00), Max: 98 (03-30-21 @ 14:00)  HR: 62 (03-30-21 @ 14:00) (53 - 62)  BP: 154/89 (03-30-21 @ 14:00) (111/56 - 159/89)  BP(mean): 68 (03-29-21 @ 17:19) (68 - 68)  ABP: --  ABP(mean): --  RR: 18 (03-30-21 @ 14:00) (15 - 20)  SpO2: 96% (03-30-21 @ 14:00) (89% - 96%)  CVP(mm Hg): --  CVP(cm H2O): --    Ins and Outs     03-29-21 @ 07:01  -  03-30-21 @ 07:00  --------------------------------------------------------  IN: 560 mL / OUT: 1200 mL / NET: -640 mL    03-30-21 @ 07:01  -  03-30-21 @ 16:52  --------------------------------------------------------  IN: 720 mL / OUT: 700 mL / NET: 20 mL                I&O's Detail    29 Mar 2021 07:01  -  30 Mar 2021 07:00  --------------------------------------------------------  IN:    Oral Fluid: 560 mL  Total IN: 560 mL    OUT:    Voided (mL): 1200 mL  Total OUT: 1200 mL    Total NET: -640 mL      30 Mar 2021 07:01  -  30 Mar 2021 16:52  --------------------------------------------------------  IN:    Oral Fluid: 720 mL  Total IN: 720 mL    OUT:    Voided (mL): 700 mL  Total OUT: 700 mL    Total NET: 20 mL

## 2021-03-30 NOTE — PHYSICAL THERAPY INITIAL EVALUATION ADULT - PERTINENT HX OF CURRENT PROBLEM, REHAB EVAL
70 yr old male with bradycardia s/p pacemaker, p/w cough, and shortness of breath, hypoxia. a/w covid PNA

## 2021-03-30 NOTE — PHYSICAL THERAPY INITIAL EVALUATION ADULT - ADDITIONAL COMMENTS
Pt lives with wife and children in a private home, 0 GUILLERMO, 5 steps to bedroom with rail. Pt stated he was independent with ambulation and ADLs prior to admission.

## 2021-03-30 NOTE — PROGRESS NOTE ADULT - ATTENDING COMMENTS
Pt seen and examined at bedside.  No acute events overnight  Pt denies cp, palpitations, sob, abd pain, N/V, fever, chills    Pt with covid-19 complicated by Wide complex tachycardia  Downgraded from ICU to med/surge floor  Currently saturating well on RA  Continue Decadron Day 8/10  Continue loading with Amiodarone  Follow up with Cardiology    PT recommendations noted; Home    Anticipate discharge home soon

## 2021-03-30 NOTE — CHART NOTE - NSCHARTNOTEFT_GEN_A_CORE
Patient wants to leave AMA. Reports he wants to do stress test as outpatient. I spoke with Dr Gonzalez who recommended inpatient stress test. patient with new onset A fib and had V tach during hospital, currently on amio loading dose.  Patient would benefit from ischemic workup.  Patient adament he wants to leave.  I called family (son, Eder and wife) and spoke with them and explained situation. They are in agreement for patient to stay overnight for 2 part stress test. They will try to speak with patient and then I will go speak with patient again and see if he agrees to remain in the hospital. If he were to leave, it would be an AMA.    Discussed with Dr Gonzalez

## 2021-03-31 LAB
ALBUMIN SERPL ELPH-MCNC: 2.6 G/DL — LOW (ref 3.3–5)
ALP SERPL-CCNC: 81 U/L — SIGNIFICANT CHANGE UP (ref 40–120)
ALT FLD-CCNC: 53 U/L — HIGH (ref 10–45)
AST SERPL-CCNC: 17 U/L — SIGNIFICANT CHANGE UP (ref 10–40)
BILIRUB DIRECT SERPL-MCNC: 0.1 MG/DL — SIGNIFICANT CHANGE UP (ref 0–0.2)
BILIRUB INDIRECT FLD-MCNC: 0.5 MG/DL — SIGNIFICANT CHANGE UP (ref 0.2–1)
BILIRUB SERPL-MCNC: 0.6 MG/DL — SIGNIFICANT CHANGE UP (ref 0.2–1.2)
CREAT SERPL-MCNC: 1.24 MG/DL — SIGNIFICANT CHANGE UP (ref 0.5–1.3)
INR BLD: 1.91 RATIO — HIGH (ref 0.88–1.16)
PROT SERPL-MCNC: 6.8 G/DL — SIGNIFICANT CHANGE UP (ref 6–8.3)
PROTHROM AB SERPL-ACNC: 22.4 SEC — HIGH (ref 10.6–13.6)

## 2021-03-31 PROCEDURE — 78452 HT MUSCLE IMAGE SPECT MULT: CPT | Mod: 26

## 2021-03-31 PROCEDURE — 93016 CV STRESS TEST SUPVJ ONLY: CPT

## 2021-03-31 PROCEDURE — 93018 CV STRESS TEST I&R ONLY: CPT

## 2021-03-31 PROCEDURE — 99233 SBSQ HOSP IP/OBS HIGH 50: CPT | Mod: 25

## 2021-03-31 RX ADMIN — POLYETHYLENE GLYCOL 3350 17 GRAM(S): 17 POWDER, FOR SOLUTION ORAL at 13:27

## 2021-03-31 RX ADMIN — ZINC SULFATE TAB 220 MG (50 MG ZINC EQUIVALENT) 220 MILLIGRAM(S): 220 (50 ZN) TAB at 13:28

## 2021-03-31 RX ADMIN — ATORVASTATIN CALCIUM 20 MILLIGRAM(S): 80 TABLET, FILM COATED ORAL at 22:24

## 2021-03-31 RX ADMIN — RIVAROXABAN 20 MILLIGRAM(S): KIT at 18:15

## 2021-03-31 RX ADMIN — LOSARTAN POTASSIUM 25 MILLIGRAM(S): 100 TABLET, FILM COATED ORAL at 06:21

## 2021-03-31 RX ADMIN — Medication 500 MILLIGRAM(S): at 13:28

## 2021-03-31 RX ADMIN — AMIODARONE HYDROCHLORIDE 400 MILLIGRAM(S): 400 TABLET ORAL at 06:21

## 2021-03-31 RX ADMIN — Medication 1 TABLET(S): at 13:28

## 2021-03-31 RX ADMIN — Medication 50 MILLIGRAM(S): at 06:21

## 2021-03-31 RX ADMIN — Medication 4000 UNIT(S): at 13:27

## 2021-03-31 RX ADMIN — AMIODARONE HYDROCHLORIDE 400 MILLIGRAM(S): 400 TABLET ORAL at 15:19

## 2021-03-31 RX ADMIN — Medication 81 MILLIGRAM(S): at 13:28

## 2021-03-31 RX ADMIN — Medication 50 MILLIGRAM(S): at 18:15

## 2021-03-31 RX ADMIN — REGADENOSON 0.4 MILLIGRAM(S): 0.08 INJECTION, SOLUTION INTRAVENOUS at 10:05

## 2021-03-31 RX ADMIN — FAMOTIDINE 20 MILLIGRAM(S): 10 INJECTION INTRAVENOUS at 13:27

## 2021-03-31 RX ADMIN — SENNA PLUS 2 TABLET(S): 8.6 TABLET ORAL at 22:24

## 2021-03-31 RX ADMIN — Medication 6 MILLIGRAM(S): at 06:21

## 2021-03-31 NOTE — PROGRESS NOTE ADULT - SUBJECTIVE AND OBJECTIVE BOX
`SUBJ:  Patient is a 70y old  Male who presents with a chief complaint of covid PNA, hypoxia (30 Mar 2021 18:35)  patient seen in stress lab  feeling weak... mild dysnpnea, no chest pain       PAST MEDICAL & SURGICAL HISTORY:  Pacemaker    Bradycardia    Pacemaker        MEDICATIONS  (STANDING):  aMIOdarone    Tablet   Oral   aMIOdarone    Tablet 400 milliGRAM(s) Oral every 8 hours  aMIOdarone    Tablet 200 milliGRAM(s) Oral daily  ascorbic acid 500 milliGRAM(s) Oral daily  aspirin  chewable 81 milliGRAM(s) Oral daily  atorvastatin 20 milliGRAM(s) Oral at bedtime  cholecalciferol 4000 Unit(s) Oral daily  dexAMETHasone     Tablet 6 milliGRAM(s) Oral daily  dextrose 40% Gel 15 Gram(s) Oral once  dextrose 5%. 1000 milliLiter(s) (50 mL/Hr) IV Continuous <Continuous>  dextrose 5%. 1000 milliLiter(s) (100 mL/Hr) IV Continuous <Continuous>  dextrose 50% Injectable 25 Gram(s) IV Push once  dextrose 50% Injectable 12.5 Gram(s) IV Push once  dextrose 50% Injectable 25 Gram(s) IV Push once  famotidine    Tablet 20 milliGRAM(s) Oral daily  glucagon  Injectable 1 milliGRAM(s) IntraMuscular once  losartan 25 milliGRAM(s) Oral daily  metoprolol tartrate 50 milliGRAM(s) Oral two times a day  multivitamin 1 Tablet(s) Oral daily  polyethylene glycol 3350 17 Gram(s) Oral daily  regadenoson Injectable 0.4 milliGRAM(s) IV Push once  rivaroxaban 20 milliGRAM(s) Oral with dinner  senna 2 Tablet(s) Oral at bedtime  zinc sulfate 220 milliGRAM(s) Oral daily    MEDICATIONS  (PRN):          Vital Signs Last 24 Hrs  T(C): 36.4 (31 Mar 2021 07:34), Max: 36.7 (30 Mar 2021 14:00)  T(F): 97.6 (31 Mar 2021 07:34), Max: 98 (30 Mar 2021 14:00)  HR: 50 (31 Mar 2021 07:34) (50 - 62)  BP: 140/80 (31 Mar 2021 07:34) (140/80 - 164/83)  BP(mean): --  RR: 20 (31 Mar 2021 07:34) (18 - 20)  SpO2: 91% (31 Mar 2021 07:34) (91% - 96%)    REVIEW OF SYSTEMS:  CONSTITUTIONAL: fatigue   RESPIRATORY: No cough, wheezing, chills or hemoptysis;   CARDIOVASCULAR: No chest pain or chest pressure.    No palpitations, dizziness, light headedness, syncope or near syncope.  No edema, no orthopnea.   NEUROLOGICAL: No headaches, memory loss, loss of strength, numbness, or tremors      PHYSICAL EXAM  Constitutional:  WDWN. No acute distress  HEENT: normocephalic, atraumatic.  PERRLA. EOMI  Neck : No JVD. no carotid bruits  Lungs:  decreased breath sounds bilaterally   Heart:  S1 and S2. No S3, S4. I/VI systolic murmur.  Abdomen:  soft, non tender.  Nuerologic:  A+O x 3. No focal deficits  Skin:  no rashes        LABS:    03-31    x   |  x   |  x   ----------------------------<  x   x    |  x   |  1.24      TPro  6.8  /  Alb  2.6<L>  /  TBili  0.6  /  DBili  0.1  /  AST  17  /  ALT  53<H>  /  AlkPhos  81  03-31      I&O's Summary    30 Mar 2021 07:01  -  31 Mar 2021 07:00  --------------------------------------------------------  IN: 1120 mL / OUT: 700 mL / NET: 420 mL    < from: 12 Lead ECG (03.28.21 @ 11:40) >  Atrial fibrillation with occasional ventricular-paced complexesand with premature ventricular or aberrantly conducted complexes    < end of copied text >    tele... SR, non sustained wide complex tachycardia     < from: TTE Echo Complete w/o Contrast w/ Doppler (03.26.21 @ 13:55) >   1. Left ventricular ejection fraction, by visual estimation, is 50 to 55%.   2. Mildly decreased global left ventricular systolic function.   3. Mildly increased LV wall thickness.   4. Normal left ventricular internal cavity size.   5. There is mild concentric left ventricular hypertrophy.   6. Mildly enlarged left atrium.   7. Thickening of the anterior and posterior mitral valve leaflets.   8. Trace mitral valve regurgitation.   9. Mild-moderate tricuspid regurgitation.  10. Estimated pulmonary artery systolic pressure is 51.6 mmHg assuming a right atrial pressure of 10 mmHg, which is consistent with moderate pulmonary hypertension.    < end of copied text >

## 2021-03-31 NOTE — PROGRESS NOTE ADULT - SUBJECTIVE AND OBJECTIVE BOX
Patient is a 70y old  Male who presents with a chief complaint of covid PNA, hypoxia (31 Mar 2021 10:41)Feels better with the breathing. Stomach not sore if he coughs. No more diarrhea but he also hasn't  moved his bowels. Had been afraid to eat because of the diarrhea.    Vital Signs Last 24 Hrs  T(C): 36.4 (31 Mar 2021 07:34), Max: 36.7 (30 Mar 2021 14:00)  T(F): 97.6 (31 Mar 2021 07:34), Max: 98 (30 Mar 2021 14:00)  HR: 50 (31 Mar 2021 07:34) (50 - 62)  BP: 140/80 (31 Mar 2021 07:34) (140/80 - 164/83)  BP(mean): --  RR: 20 (31 Mar 2021 07:34) (18 - 20)  SpO2: 91% (31 Mar 2021 07:34) (91% - 96%)  No Known Allergies    MEDICATIONS  (STANDING):  aMIOdarone    Tablet   Oral   aMIOdarone    Tablet 400 milliGRAM(s) Oral every 8 hours  aMIOdarone    Tablet 200 milliGRAM(s) Oral daily  ascorbic acid 500 milliGRAM(s) Oral daily  aspirin  chewable 81 milliGRAM(s) Oral daily  atorvastatin 20 milliGRAM(s) Oral at bedtime  cholecalciferol 4000 Unit(s) Oral daily  dexAMETHasone     Tablet 6 milliGRAM(s) Oral daily  dextrose 40% Gel 15 Gram(s) Oral once  dextrose 5%. 1000 milliLiter(s) (50 mL/Hr) IV Continuous <Continuous>  dextrose 5%. 1000 milliLiter(s) (100 mL/Hr) IV Continuous <Continuous>  dextrose 50% Injectable 25 Gram(s) IV Push once  dextrose 50% Injectable 12.5 Gram(s) IV Push once  dextrose 50% Injectable 25 Gram(s) IV Push once  famotidine    Tablet 20 milliGRAM(s) Oral daily  glucagon  Injectable 1 milliGRAM(s) IntraMuscular once  losartan 25 milliGRAM(s) Oral daily  metoprolol tartrate 50 milliGRAM(s) Oral two times a day  multivitamin 1 Tablet(s) Oral daily  polyethylene glycol 3350 17 Gram(s) Oral daily  regadenoson Injectable 0.4 milliGRAM(s) IV Push once  rivaroxaban 20 milliGRAM(s) Oral with dinner  senna 2 Tablet(s) Oral at bedtime  zinc sulfate 220 milliGRAM(s) Oral daily    MEDICATIONS  (PRN):      PHYSICAL EXAM:  GENERAL: NAD, well-groomed, well-developed  HEAD:  Atraumatic, Normocephalic, flushed face  CHEST/LUNG: Clear to percussion bilaterally; No rales, rhonchi, wheezing, or rubs, decreased BS at bases  HEART: Regular rate and rhythm; No murmurs, rubs, or gallops  ABDOMEN: Soft, Nontender, Nondistended; Bowel sounds present  EXTREMITIES:  2+ Peripheral Pulses, No clubbing, cyanosis, or edema  LYMPH: No lymphadenopathy noted  SKIN: No rashes or lesions    Consultant(s) Notes Reviewed:  [x ] YES  [ ] NO  Care Discussed with Consultants/Other Providers [ x] YES  [ ] NO    LABS:    03-31    x   |  x   |  x   ----------------------------<  x   x    |  x   |  1.24      TPro  6.8  /  Alb  2.6<L>  /  TBili  0.6  /  DBili  0.1  /  AST  17  /  ALT  53<H>  /  AlkPhos  81  03-31        PT/INR - ( 31 Mar 2021 05:00 )   PT: 22.4 sec;   INR: 1.91 ratio               RADIOLOGY & ADDITIONAL TESTS:    Imaging Personally Reviewed:  [ ] YES  [ ] NO

## 2021-03-31 NOTE — CHART NOTE - NSCHARTNOTEFT_GEN_A_CORE
Labs and imaging reviewed:      70 yr old male with h/o bradycardia s/p pacemaker, not on any meds, p/w cough, and shortness of breath for the last 2 days due to Covid PNA.  Hospital course c/b Atrial fib.  Patient transferred to ICU for higher care and downgraded to medical floor on 3/29.     Acute hypoxic Respiratory Failure:  Covid 19:  Currently satting well on RA --satting 93-94% with ambulation on room air  Completed remdemsivir  Cont dexamethasone until 4/3  DVT ppx  Encourage ambulation  PT eval-rec home    Atrial Fib:  Troponins negative  Cardio following  currently in sinus  Monitored on tele  Cont amiodarone  and metoprolol 25 BID  Elevated CHADS2-Vasc 3 (HTN, borderline DM, Age) -cont Xarelto   TTE with EF 55%, mildly decreased LV function, mod pulm HTN   2 part stress test--part 2 tomorrow (NPO @ [facility])    Ventricular ectopy   Noted to have wide complex tachycardia --improving   Asymptomatic   Negative cardiac enzymes   Unknown etiology electrolyte derangements COVID-19 infection/myocarditis, CAD/ischemia, other.   Inpatient ischemic workup-part 2 of stress test tomorrow     HTN:  stable  Cont metoprolol and losartan    Obesity:  weight loss advised    Patient will update family  DVT ppx: xarelto   Dispo: home pending cardiac clearance-suspect 4/1 . Wife at home with covid as well.    Case d/w Dr Selby and Dr Ngo Labs and imaging reviewed:      70 yr old male with h/o bradycardia s/p pacemaker, not on any meds, p/w cough, and shortness of breath for the last 2 days due to Covid PNA.  Hospital course c/b Atrial fib.  Patient transferred to ICU for higher care and downgraded to medical floor on 3/29.     *Acute hypoxic Respiratory Failure:  *Covid 19:  Currently satting well on RA --satting 93-94% with ambulation on room air  Completed remdemsivir  Cont dexamethasone until 4/3  DVT ppx  Encourage ambulation  PT eval-rec home    *Atrial Fib:  Troponins negative  Cardio following  currently in sinus  Monitored on tele  Cont amiodarone  and metoprolol 25 BID  Elevated CHADS2-Vasc 3 (HTN, borderline DM, Age) -cont Xarelto   TTE with EF 55%, mildly decreased LV function, mod pulm HTN   2 part stress test--part 2 tomorrow (NPO @ [facility])    *Ventricular ectopy   Noted to have wide complex tachycardia --improving   Asymptomatic   Negative cardiac enzymes   Unknown etiology electrolyte derangements COVID-19 infection/myocarditis, CAD/ischemia, other.   Inpatient ischemic workup-part 2 of stress test tomorrow     *HTN:  stable  Cont metoprolol and losartan      DVT ppx: xarelto   Dispo: home pending cardiac clearance-suspect 4/1 . Wife at home with covid as well.  Medications delivered to the bedside today.    Case d/w Dr Selby and Dr Ngo

## 2021-03-31 NOTE — PROGRESS NOTE ADULT - SUBJECTIVE AND OBJECTIVE BOX
Follow-up Critical Care Progress Note  Chief Complaint : Hypoxemia        No new events overnight.  Denies SOB/CP.     Allergies :No Known Allergies      PAST MEDICAL & SURGICAL HISTORY:  Pacemaker    Bradycardia    Pacemaker        Medications:  MEDICATIONS  (STANDING):  aMIOdarone    Tablet   Oral   aMIOdarone    Tablet 200 milliGRAM(s) Oral daily  ascorbic acid 500 milliGRAM(s) Oral daily  aspirin  chewable 81 milliGRAM(s) Oral daily  atorvastatin 20 milliGRAM(s) Oral at bedtime  cholecalciferol 4000 Unit(s) Oral daily  dexAMETHasone     Tablet 6 milliGRAM(s) Oral daily  dextrose 40% Gel 15 Gram(s) Oral once  dextrose 5%. 1000 milliLiter(s) (50 mL/Hr) IV Continuous <Continuous>  dextrose 5%. 1000 milliLiter(s) (100 mL/Hr) IV Continuous <Continuous>  dextrose 50% Injectable 25 Gram(s) IV Push once  dextrose 50% Injectable 12.5 Gram(s) IV Push once  dextrose 50% Injectable 25 Gram(s) IV Push once  famotidine    Tablet 20 milliGRAM(s) Oral daily  glucagon  Injectable 1 milliGRAM(s) IntraMuscular once  losartan 25 milliGRAM(s) Oral daily  metoprolol tartrate 50 milliGRAM(s) Oral two times a day  multivitamin 1 Tablet(s) Oral daily  polyethylene glycol 3350 17 Gram(s) Oral daily  rivaroxaban 20 milliGRAM(s) Oral with dinner  senna 2 Tablet(s) Oral at bedtime  zinc sulfate 220 milliGRAM(s) Oral daily    MEDICATIONS  (PRN):      LABS:    03-31    x   |  x   |  x   ----------------------------<  x   x    |  x   |  1.24      TPro  6.8  /  Alb  2.6<L>  /  TBili  0.6  /  DBili  0.1  /  AST  17  /  ALT  53<H>  /  AlkPhos  81  03-31            PT/INR - ( 31 Mar 2021 05:00 )   PT: 22.4 sec;   INR: 1.91 ratio                     CULTURES: (if applicable)          CAPILLARY BLOOD GLUCOSE          RADIOLOGY  CXR:      CT:    ECHO:      VITALS:  T(C): 36.4 (03-31-21 @ 07:34), Max: 36.6 (03-31-21 @ 05:12)  T(F): 97.6 (03-31-21 @ 07:34), Max: 97.8 (03-31-21 @ 05:12)  HR: 50 (03-31-21 @ 07:34) (50 - 55)  BP: 140/80 (03-31-21 @ 07:34) (140/80 - 164/83)  BP(mean): --  ABP: --  ABP(mean): --  RR: 20 (03-31-21 @ 07:34) (18 - 20)  SpO2: 91% (03-31-21 @ 07:34) (91% - 95%)  CVP(mm Hg): --  CVP(cm H2O): --    Ins and Outs     03-30-21 @ 07:01  -  03-31-21 @ 07:00  --------------------------------------------------------  IN: 1120 mL / OUT: 700 mL / NET: 420 mL                I&O's Detail    30 Mar 2021 07:01  -  31 Mar 2021 07:00  --------------------------------------------------------  IN:    Oral Fluid: 1120 mL  Total IN: 1120 mL    OUT:    Voided (mL): 700 mL  Total OUT: 700 mL    Total NET: 420 mL

## 2021-04-01 LAB — SARS-COV-2 RNA SPEC QL NAA+PROBE: DETECTED

## 2021-04-01 PROCEDURE — 99233 SBSQ HOSP IP/OBS HIGH 50: CPT

## 2021-04-01 RX ORDER — AMLODIPINE BESYLATE 2.5 MG/1
2.5 TABLET ORAL DAILY
Refills: 0 | Status: DISCONTINUED | OUTPATIENT
Start: 2021-04-01 | End: 2021-04-02

## 2021-04-01 RX ADMIN — AMIODARONE HYDROCHLORIDE 200 MILLIGRAM(S): 400 TABLET ORAL at 05:45

## 2021-04-01 RX ADMIN — Medication 500 MILLIGRAM(S): at 13:26

## 2021-04-01 RX ADMIN — FAMOTIDINE 20 MILLIGRAM(S): 10 INJECTION INTRAVENOUS at 13:26

## 2021-04-01 RX ADMIN — Medication 4000 UNIT(S): at 13:26

## 2021-04-01 RX ADMIN — ATORVASTATIN CALCIUM 20 MILLIGRAM(S): 80 TABLET, FILM COATED ORAL at 23:20

## 2021-04-01 RX ADMIN — RIVAROXABAN 20 MILLIGRAM(S): KIT at 17:48

## 2021-04-01 RX ADMIN — Medication 1 TABLET(S): at 13:26

## 2021-04-01 RX ADMIN — ZINC SULFATE TAB 220 MG (50 MG ZINC EQUIVALENT) 220 MILLIGRAM(S): 220 (50 ZN) TAB at 13:26

## 2021-04-01 RX ADMIN — LOSARTAN POTASSIUM 25 MILLIGRAM(S): 100 TABLET, FILM COATED ORAL at 05:45

## 2021-04-01 RX ADMIN — Medication 50 MILLIGRAM(S): at 17:48

## 2021-04-01 RX ADMIN — Medication 6 MILLIGRAM(S): at 05:45

## 2021-04-01 RX ADMIN — Medication 81 MILLIGRAM(S): at 13:26

## 2021-04-01 RX ADMIN — AMLODIPINE BESYLATE 2.5 MILLIGRAM(S): 2.5 TABLET ORAL at 13:26

## 2021-04-01 NOTE — CHART NOTE - NSCHARTNOTESELECT_GEN_ALL_CORE
Hospitalist PA/Event Note
Event Note
Event Note
Hospitalist PA/Off Service Note
Nutrition Services
Nutrition Services
Off Service Note
Off Service Note
PA Hospitalist/Event Note
PA Hospitalist/Off Service Note

## 2021-04-01 NOTE — PHARMACOTHERAPY INTERVENTION NOTE - COMMENTS
Met with patient and counseled on new medications. Discussed reasons for use, directions for use and possible side effects. Discussed signs of bleeding to watch for and advised pt to avoid NSAIDs. Recommended pt take Xarelto with his largest meal. Pt verbalized understanding and all questions answered.
Coordinated delivery of prescription medications to bedside prior to patient discharge. Pt received rivaroxaban, aspirin, metoprolol, amiodarone, famotidine, atorvastatin, losartan, and dexamethasone.

## 2021-04-01 NOTE — CHART NOTE - NSCHARTNOTEFT_GEN_A_CORE
NUTRITION FOLLOW UP    SOURCE: Patient [X)   Family [ ]     other [ ]    DIET: Regular dash    PATIENT REPORT[ ] nausea  [ ] vomiting [ ] diarrhea [ ] constipation  [ ]chewing problems [ ] swallowing issues  [ ] other: no GI distress    PO INTAKE:  < 50% [ ]   50-75%  [ ]   %  [X]  other :    SOURCE: for PO intake [X] Patient [ ] family [ ] chart [ ] staff [ ] other    ENTERAL/PARENTERAL NUTRITION: n/a    CURRENT WEIGHT:  3/24  145.1 kg.    PERTINENT LABS:      Creatinine    ACCUCHECK      SKIN: intact, no edema noted, last bm was 3/30    ESTIMATED NEEDS:   [X] no change since previous assessment  [ ] recalculated:     PREVIOUS NUTRITION DIAGNOSIS: obesity    NUTRITION DIAGNOSIS is   [X] ongoing    NEW NUTRITION DIAGNOSIS: [X] not applicable    MONITORING AND EVALUATION:   Current diet order is appropriate and is well tolerated, but will monitor for any changes that may be needed    [X] PO intake [X] Tolerance to diet prescription [X] weights [X] follow up per protocol    NUTRITION RECOMMENDATIONS: maintain adequate nutrition/hydration    RD remains available LILIANA Bernstein RD CDE

## 2021-04-01 NOTE — PROGRESS NOTE ADULT - SUBJECTIVE AND OBJECTIVE BOX
Patient is a 70y old  Male who presents with a chief complaint of covid PNA, hypoxia (31 Mar 2021 15:22) Feeling pretty good. NPO for second part which is scheduled for 4. +BM        Vital Signs Last 24 Hrs  T(C): 36.4 (01 Apr 2021 05:02), Max: 36.6 (31 Mar 2021 22:19)  T(F): 97.6 (01 Apr 2021 05:02), Max: 97.9 (31 Mar 2021 22:19)  HR: 52 (01 Apr 2021 05:43) (52 - 58)  BP: 151/84 (01 Apr 2021 05:43) (132/71 - 181/91)  BP(mean): --  RR: 20 (01 Apr 2021 05:02) (18 - 22)  SpO2: 93% (01 Apr 2021 05:02) (92% - 94%)  No Known Allergies    MEDICATIONS  (STANDING):  aMIOdarone    Tablet   Oral   aMIOdarone    Tablet 200 milliGRAM(s) Oral daily  ascorbic acid 500 milliGRAM(s) Oral daily  aspirin  chewable 81 milliGRAM(s) Oral daily  atorvastatin 20 milliGRAM(s) Oral at bedtime  cholecalciferol 4000 Unit(s) Oral daily  dexAMETHasone     Tablet 6 milliGRAM(s) Oral daily  dextrose 40% Gel 15 Gram(s) Oral once  dextrose 5%. 1000 milliLiter(s) (50 mL/Hr) IV Continuous <Continuous>  dextrose 5%. 1000 milliLiter(s) (100 mL/Hr) IV Continuous <Continuous>  dextrose 50% Injectable 25 Gram(s) IV Push once  dextrose 50% Injectable 12.5 Gram(s) IV Push once  dextrose 50% Injectable 25 Gram(s) IV Push once  famotidine    Tablet 20 milliGRAM(s) Oral daily  glucagon  Injectable 1 milliGRAM(s) IntraMuscular once  metoprolol tartrate 50 milliGRAM(s) Oral two times a day  multivitamin 1 Tablet(s) Oral daily  polyethylene glycol 3350 17 Gram(s) Oral daily  rivaroxaban 20 milliGRAM(s) Oral with dinner  senna 2 Tablet(s) Oral at bedtime  zinc sulfate 220 milliGRAM(s) Oral daily    MEDICATIONS  (PRN):      PHYSICAL EXAM:  GENERAL: NAD, well-groomed, well-developed  HEAD:  Atraumatic, Normocephalic  EYES: EOMI, PERRLA, conjunctiva and sclera clear  ENMT: No tonsillar erythema, exudates, or enlargement; Moist mucous membranes, Good dentition, No lesions  NECK: Supple, No JVD, Normal thyroid  NERVOUS SYSTEM:  Alert & Oriented X3, Good concentration; Motor Strength 5/5 B/L upper and lower extremities; DTRs 2+ intact and symmetric  CHEST/LUNG: Clear to percussion bilaterally; No rales, rhonchi, wheezing, or rubs  HEART: Regular rate and rhythm; No murmurs, rubs, or gallops  ABDOMEN: Soft, Nontender, Nondistended; Bowel sounds present  EXTREMITIES:  2+ Peripheral Pulses, No clubbing, cyanosis, or edema  LYMPH: No lymphadenopathy noted  SKIN: No rashes or lesions    Consultant(s) Notes Reviewed:  [x ] YES  [ ] NO  Care Discussed with Consultants/Other Providers [ x] YES  [ ] NO    LABS:    04-01    x   |  x   |  x   ----------------------------<  x   x    |  x   |  1.29      TPro  7.0  /  Alb  2.7<L>  /  TBili  0.6  /  DBili  0.1  /  AST  23  /  ALT  52<H>  /  AlkPhos  77  04-01        PT/INR - ( 01 Apr 2021 07:15 )   PT: 20.5 sec;   INR: 1.74 ratio               RADIOLOGY & ADDITIONAL TESTS:    Imaging Personally Reviewed:  [ ] YES  [ ] NO

## 2021-04-01 NOTE — CHART NOTE - NSCHARTNOTEFT_GEN_A_CORE
Labs and imaging reviewed:    70 yr old male with h/o bradycardia s/p pacemaker, not on any meds, p/w cough, and shortness of breath for the last 2 days due to Covid PNA.  Hospital course c/b Atrial fib.  Patient transferred to ICU for higher care and downgraded to medical floor on 3/29.   Pt. now with abnormal NST, recommendation from Cardiology is Cardiac Cath.    *Acute hypoxic Respiratory Failure:  *Covid 19:  Currently satting well on RA --satting 93-94% with ambulation on room air  Completed remdemsivir  Cont dexamethasone until 4/3  DVT ppx  Encourage ambulation  PT eval-rec home    *Atrial Fib:  Troponins negative  Cardio following  currently in sinus  Monitored on tele  Cont amiodarone  and metoprolol 25 BID  Elevated CHADS2-Vasc 3 (HTN, borderline DM, Age) -cont Xarelto   TTE with EF 55%, mildly decreased LV function, mod pulm HTN     *Ventricular ectopy   Noted to have wide complex tachycardia --improving   Asymptomatic   Negative cardiac enzymes   Unknown etiology electrolyte derangements COVID-19 infection/myocarditis, CAD/ischemia, other.     *HTN:  stable  Cont metoprolol and losartan      DVT ppx: xarelto   Dispo: home pending cardiac clearance-suspect 4/1 . Wife at home with covid as well.  Medications delivered to the bedside today.    Case d/w Dr Selby and Dr Ngo

## 2021-04-02 ENCOUNTER — TRANSCRIPTION ENCOUNTER (OUTPATIENT)
Age: 71
End: 2021-04-02

## 2021-04-02 VITALS
SYSTOLIC BLOOD PRESSURE: 134 MMHG | DIASTOLIC BLOOD PRESSURE: 74 MMHG | RESPIRATION RATE: 18 BRPM | OXYGEN SATURATION: 95 % | HEART RATE: 58 BPM | TEMPERATURE: 97 F

## 2021-04-02 LAB
ALBUMIN SERPL ELPH-MCNC: 2.8 G/DL — LOW (ref 3.3–5)
ALP SERPL-CCNC: 80 U/L — SIGNIFICANT CHANGE UP (ref 40–120)
ALT FLD-CCNC: 51 U/L — HIGH (ref 10–45)
ANION GAP SERPL CALC-SCNC: 6 MMOL/L — SIGNIFICANT CHANGE UP (ref 5–17)
AST SERPL-CCNC: 19 U/L — SIGNIFICANT CHANGE UP (ref 10–40)
BILIRUB DIRECT SERPL-MCNC: 0.2 MG/DL — SIGNIFICANT CHANGE UP (ref 0–0.2)
BILIRUB INDIRECT FLD-MCNC: 0.6 MG/DL — SIGNIFICANT CHANGE UP (ref 0.2–1)
BILIRUB SERPL-MCNC: 0.8 MG/DL — SIGNIFICANT CHANGE UP (ref 0.2–1.2)
BUN SERPL-MCNC: 45 MG/DL — HIGH (ref 7–23)
CALCIUM SERPL-MCNC: 8.5 MG/DL — SIGNIFICANT CHANGE UP (ref 8.4–10.5)
CHLORIDE SERPL-SCNC: 103 MMOL/L — SIGNIFICANT CHANGE UP (ref 96–108)
CO2 SERPL-SCNC: 30 MMOL/L — SIGNIFICANT CHANGE UP (ref 22–31)
CREAT SERPL-MCNC: 1.49 MG/DL — HIGH (ref 0.5–1.3)
CREAT SERPL-MCNC: 1.5 MG/DL — HIGH (ref 0.5–1.3)
GLUCOSE SERPL-MCNC: 117 MG/DL — HIGH (ref 70–99)
HCT VFR BLD CALC: 48.3 % — SIGNIFICANT CHANGE UP (ref 39–50)
HGB BLD-MCNC: 15.9 G/DL — SIGNIFICANT CHANGE UP (ref 13–17)
INR BLD: 1.95 RATIO — HIGH (ref 0.88–1.16)
MCHC RBC-ENTMCNC: 29.8 PG — SIGNIFICANT CHANGE UP (ref 27–34)
MCHC RBC-ENTMCNC: 32.9 GM/DL — SIGNIFICANT CHANGE UP (ref 32–36)
MCV RBC AUTO: 90.6 FL — SIGNIFICANT CHANGE UP (ref 80–100)
NRBC # BLD: 0 /100 WBCS — SIGNIFICANT CHANGE UP (ref 0–0)
PLATELET # BLD AUTO: 374 K/UL — SIGNIFICANT CHANGE UP (ref 150–400)
POTASSIUM SERPL-MCNC: 5.1 MMOL/L — SIGNIFICANT CHANGE UP (ref 3.5–5.3)
POTASSIUM SERPL-SCNC: 5.1 MMOL/L — SIGNIFICANT CHANGE UP (ref 3.5–5.3)
PROT SERPL-MCNC: 6.9 G/DL — SIGNIFICANT CHANGE UP (ref 6–8.3)
PROTHROM AB SERPL-ACNC: 22.9 SEC — HIGH (ref 10.6–13.6)
RBC # BLD: 5.33 M/UL — SIGNIFICANT CHANGE UP (ref 4.2–5.8)
RBC # FLD: 13.7 % — SIGNIFICANT CHANGE UP (ref 10.3–14.5)
SODIUM SERPL-SCNC: 139 MMOL/L — SIGNIFICANT CHANGE UP (ref 135–145)
WBC # BLD: 12.2 K/UL — HIGH (ref 3.8–10.5)
WBC # FLD AUTO: 12.2 K/UL — HIGH (ref 3.8–10.5)

## 2021-04-02 PROCEDURE — 99233 SBSQ HOSP IP/OBS HIGH 50: CPT

## 2021-04-02 RX ADMIN — Medication 6 MILLIGRAM(S): at 06:25

## 2021-04-02 RX ADMIN — Medication 500 MILLIGRAM(S): at 11:35

## 2021-04-02 RX ADMIN — ZINC SULFATE TAB 220 MG (50 MG ZINC EQUIVALENT) 220 MILLIGRAM(S): 220 (50 ZN) TAB at 11:35

## 2021-04-02 RX ADMIN — Medication 1 TABLET(S): at 11:35

## 2021-04-02 RX ADMIN — AMLODIPINE BESYLATE 2.5 MILLIGRAM(S): 2.5 TABLET ORAL at 06:25

## 2021-04-02 RX ADMIN — Medication 81 MILLIGRAM(S): at 11:36

## 2021-04-02 RX ADMIN — Medication 4000 UNIT(S): at 11:35

## 2021-04-02 RX ADMIN — FAMOTIDINE 20 MILLIGRAM(S): 10 INJECTION INTRAVENOUS at 11:34

## 2021-04-02 NOTE — PROGRESS NOTE ADULT - ASSESSMENT
70 yr old male with /o bradycardia s/p pacemaker, not on any meds, p/w cough, and shortness of breath for the last 2 days, hypoxia. a/w covid PNA, hypoxia      >Acute hypoxic resp failure,   > Pneumonia due to COVID 19  - admit to med/surg  - tele monitor/pulse ox  - currently on 2L. wean off as able   - remdisivir for 5 days  - famotidine  - decadron for 10 days. switch to PO in 24-48 hrs  - MVT, Vit c, Vit D, Zinc  - check biomarkers  - incentive spirometry    > elevated ddimer  > transaminitis  -  likely due to covid  - monitor    > elevated BP  - no h/o HTN  - DASh diet  - anti-HTN if persistently elevated    > constipation  - senna, miralax  - encouraged PO intake    > h/o bradycardia s/p PPM  - monitor    > Morbid obesity  - weight loss    DVT-P: lovenox BID    updated Son Edre Cruz [phone]      
70 yr old male with h/o bradycardia s/p pacemaker, not on any meds, p/w cough, and shortness of breath for the last 2 days due to Covid PNA.  Hospital course c/b Atrial fib.  Patient transferred to ICU for higher care and downgraded to medical floor on 3/29.     Acute hypoxic Respiratory Failure:  Covid 19:  Currently satting well on RA --satting 93-94% with ambulation on room air  Completed remdemsivir  Cont dexamethasone until 4/3  DVT ppx  Encourage ambulation  PT eval-rec home    Atrial Fib:  Troponins negative  Cardio following--recommended ischemic workup--inpatient vs outpatient   currently in sinus  Monitored on tele: overnight SB/SR 49-87  Cont amiodarone load and metoprolol 25 BID  Elevated CHADS2-Vasc 3 (HTN, borderline DM, Age) -cont Xarelto   TTE with EF 55%, mildly decreased LV function, mod pulm HTN       Ventricular ectopy   Noted to have wide complex tachycardia last week --> now resolved  Asymptomatic   Negative cardiac enzymes   Unknown etiology electrolyte derangements COVID-19 infection/myocarditis, CAD/ischemia, other.   Inpatient vs outpatient ischemic workup    HTN:  stable  Cont metoprolol and losartan    Obesity:  weight loss advised    Patient will update family  DVT ppx: xarelto   Dispo: home pending cardiac clearance . Wife at home with covid as well.  
Covid pneumonia with hypoxia - improved. Completed decadron and rendesmevir  s/p SVT - nuclear stress test +for ischemia. Needs cardiac cath but he continues to have a positive PCR. So will discharge patient, have him contact his cardiologist Dr Smith and schedule the procedure as an outpatient. Not to do anything strenuous. Should take it easy.   ROMY - off losartan. Has been NPO for two mornings and feel that has contributed. Encouraged patient to hydrate when home, 
PHYSICAL EXAM:  GENERAL: NAD, well-groomed, well-developed  HEAD:  Atraumatic, Normocephalic  NERVOUS SYSTEM:  Alert & Oriented X3, Good concentration; Motor Strength 5/5 B/L upper and lower extremities; DTRs 2+ intact and symmetric  CHEST/LUNGS: Clear to auscultation bilaterally, no wheezing or ronchi  HEART: Regular rate and rhythm; No murmurs, rubs, or gallops  ABDOMEN: Soft,  Nontender, Nondistended; Bowel sounds present  EXTREMITIES:  2+ Peripheral Pulses, No clubbing, cyanosis, or edema  LYMPH: No lymphadenopathy noted  SKIN: No rashes or lesions    Assessment:  1. Wide complex tachycardia  2. Acute hypoxia  3. COVID 19 infection   4. Obesity   5. Moderate pulmonary hypertension     Plan  - Cont to monitor for cardiac arrythmia   - Not in distress  - Hemodynamically stable   - Cardiology follow up requested  - cont. beta blockers and antihypertensives  - Oxygen support to maintain saturation > 90%  - Cont. Remdesivir and dexamethasone  - OOB to chair  - Oral diet  - DVT and stress ulcer prophylaxis 
PHYSICAL EXAM:  GENERAL: NAD, well-groomed, well-developed  HEAD:  Atraumatic, Normocephalic  NERVOUS SYSTEM:  Alert & Oriented X3, Good concentration; Motor Strength 5/5 B/L upper and lower extremities; DTRs 2+ intact and symmetric  CHEST/LUNGS: Clear to auscultation bilaterally, no wheezing or ronchi or rales  HEART: Regular rate and rhythm; No murmurs, rubs, or gallops  ABDOMEN: Soft,  Nontender, Nondistended; Bowel sounds present  EXTREMITIES:  2+ Peripheral Pulses, No clubbing, cyanosis, or edema  LYMPH: No lymphadenopathy noted  SKIN: No rashes or lesions    Assessment:  1. Wide complex tachycardia  2. Acute hypoxia  3. COVID 19 infection   4. Obesity   5. Moderate pulmonary hypertension     Plan  - Cont to monitor for cardiac arrythmia   - Cont. Beta blockers and amiodarone  - Cardiology follow up, for ischemic work up with possible stress test  - Hemodynamically stable   - cont. antihypertensives  - Cont. Asprin, statin and Xarelto as per cardiology  - Monitor oxygen level and supplement to maintain saturation > 90%  - Completed. Remdesivir, Cont. dexamethasone  - OOB to chair  - Oral diet  - DVT and stress ulcer prophylaxis 
PHYSICAL EXAM:  GENERAL: NAD, well-groomed, well-developed  HEAD:  Atraumatic, Normocephalic  NERVOUS SYSTEM:  Alert & Oriented X3, Good concentration; Motor Strength 5/5 B/L upper and lower extremities; DTRs 2+ intact and symmetric  CHEST/LUNGS: Clear to auscultation bilaterally, no wheezing or ronchi or rales  HEART: Regular rate and rhythm; No murmurs, rubs, or gallops  ABDOMEN: Soft,  Nontender, Nondistended; Bowel sounds present  EXTREMITIES:  2+ Peripheral Pulses, No clubbing, cyanosis, or edema  LYMPH: No lymphadenopathy noted  SKIN: No rashes or lesions    Assessment:  1. Wide complex tachycardia  2. Acute hypoxia  3. COVID 19 infection   4. Obesity   5. Moderate pulmonary hypertension     Plan  - Cont to monitor for cardiac arrythmia   - Cont. Beta blockers and amiodarone  - Cardiology follow up, for ischemic work up with stress test  - Hemodynamically stable   - cont. antihypertensives  - Cont. Asprin, statin and Xarelto as per cardiology  - Monitor oxygen level and supplement to maintain saturation > 90%  - Completed. Remdesivir, Cont. dexamethasone for 10 days  - OOB to chair  - Oral diet  - DVT and stress ulcer prophylaxis   - At this time no active critical care issues, reconsult if needed
PHYSICAL EXAM:  GENERAL: NAD, well-groomed, well-developed  HEAD:  Atraumatic, Normocephalic  NERVOUS SYSTEM:  Alert & Oriented X3, Good concentration; Motor Strength 5/5 B/L upper and lower extremities; DTRs 2+ intact and symmetric  CHEST/LUNGS: Clear to auscultation bilaterally, no wheezing or ronchi or rales  HEART: Regular rate and rhythm; No murmurs, rubs, or gallops  ABDOMEN: Soft,  Nontender, Nondistended; Bowel sounds present  EXTREMITIES:  2+ Peripheral Pulses, No clubbing, cyanosis, or edema  LYMPH: No lymphadenopathy noted  SKIN: No rashes or lesions    Assessment:  1. Wide complex tachycardia  2. Acute hypoxia  3. COVID 19 infection   4. Obesity   5. Moderate pulmonary hypertension     Plan  - Cont to monitor for cardiac arrythmia   - Cont. Beta blockers and amiodarone  - Interrogate PPM  - Cardiology follow up  - Not in distress  - Hemodynamically stable   - cont. antihypertensives  - Cont. Asprin, statin and Xarelto as per cardiology  - Oxygen support to maintain saturation > 90%, titrate down as tolerated, Noted to be down to 88% on room air at rest  - Cont. Remdesivir and dexamethasone  - OOB to chair  - Oral diet  - DVT and stress ulcer prophylaxis   - Pending transfer out of ICU today
70 year old man admitted with dyspnea related to COVID pneumonia.  New AF on admission. s/p ICU for increased ventricular ectopy and hypoxia... s/p amiodarone.  Now on medical floor.  Prior cardiac history of bradycardia, s/p PPM   Currently in sinus rhythm.  Pharmacologic nuclear stress test to rule out significant CAD    Plan  - continue telemetry  - continue amiodarone  - continue metoprolol  - continue anticoagulation  - followup results of nuclear stress test... part 2 is scheduled for tomorrow    discussed with patient and with Medicine team       
Covid PNA with hypoxia  Appreciate Dr Gonzalez's note, had wide complex tachy. Has not had cardiac workup in more than a year. Will need outpatient w/u once improved from his covid. Pressure is up so will start him on cozaar with parameters. 
PHYSICAL EXAM:  GENERAL: NAD, well-groomed, well-developed  HEAD:  Atraumatic, Normocephalic  NERVOUS SYSTEM:  Alert & Oriented X3, Good concentration; Motor Strength 5/5 B/L upper and lower extremities; DTRs 2+ intact and symmetric  CHEST/LUNGS: Clear to auscultation bilaterally, no wheezing or ronchi or rales  HEART: Regular rate and rhythm; No murmurs, rubs, or gallops  ABDOMEN: Soft,  Nontender, Nondistended; Bowel sounds present  EXTREMITIES:  2+ Peripheral Pulses, No clubbing, cyanosis, or edema  LYMPH: No lymphadenopathy noted  SKIN: No rashes or lesions    Assessment:  1. Wide complex tachycardia  2. Acute hypoxia  3. COVID 19 infection   4. Obesity   5. Moderate pulmonary hypertension     Plan  - Cont to monitor for cardiac arrythmia   - Cont. Beta blockers, started on amiodarone  - Interrogate PPM  - Cardiology follow up  - Not in distress  - Hemodynamically stable   - cont. antihypertensives  - Start Asprin, statin and Xarelto as per cardiology  - Oxygen support to maintain saturation > 90%, titrate down as tolerated  - Cont. Remdesivir and dexamethasone  - OOB to chair  - Oral diet  - DVT and stress ulcer prophylaxis   - Transfer out of ICU today
covid PNA - Remdesivir and dexamethasone as ordered, oxygen. on pepcid. Sugars getting checked, running stable on the steroids and the lispro premeal.
iMP    patient with severe bilateral Covid pna  now improved from cardiac perspective    however-he is being loaded with amio/ he is still having difficulty oxygenating    he is a high risk covid pt based on both oxygenation anf from an arrythmic and vascular standpoint. he should continue to be observed with a cardiac monitor    suggest repeat cxr as last one is 3/24
Covid pna with hypoxia - improving  s/p SVT - part 1 of nuclear stress test done, second tomorrow. on amiodorarone and metoprolol\  HTN losartan  hyperlipidemia - lipitor
Covid PNA/hypoxia - improved. On baby asa  SVT - controlled on metoprolol and amiodarone. For part 2 of nuclear stress test. Xarelto also added. Pending test results possible discharge after,  HTN - stopped losartan as renal function has trended down. Will add on norvasc.

## 2021-04-02 NOTE — PROGRESS NOTE ADULT - SUBJECTIVE AND OBJECTIVE BOX
Patient is a 70y old  Male who presents with a chief complaint of covid PNA, hypoxia (02 Apr 2021 09:52) Has no new complaints      Vital Signs Last 24 Hrs  T(C): 36.3 (02 Apr 2021 05:57), Max: 36.6 (01 Apr 2021 11:31)  T(F): 97.4 (02 Apr 2021 05:57), Max: 97.9 (01 Apr 2021 11:31)  HR: 52 (02 Apr 2021 05:57) (50 - 60)  BP: 155/83 (02 Apr 2021 05:57) (138/70 - 155/83)  BP(mean): --  RR: 20 (02 Apr 2021 05:57) (18 - 20)  SpO2: 95% (02 Apr 2021 05:57) (92% - 95%)  No Known Allergies    MEDICATIONS  (STANDING):  aMIOdarone    Tablet   Oral   aMIOdarone    Tablet 200 milliGRAM(s) Oral daily  amLODIPine   Tablet 2.5 milliGRAM(s) Oral daily  ascorbic acid 500 milliGRAM(s) Oral daily  aspirin  chewable 81 milliGRAM(s) Oral daily  atorvastatin 20 milliGRAM(s) Oral at bedtime  cholecalciferol 4000 Unit(s) Oral daily  dexAMETHasone     Tablet 6 milliGRAM(s) Oral daily  dextrose 40% Gel 15 Gram(s) Oral once  dextrose 5%. 1000 milliLiter(s) (50 mL/Hr) IV Continuous <Continuous>  dextrose 5%. 1000 milliLiter(s) (100 mL/Hr) IV Continuous <Continuous>  dextrose 50% Injectable 25 Gram(s) IV Push once  dextrose 50% Injectable 12.5 Gram(s) IV Push once  dextrose 50% Injectable 25 Gram(s) IV Push once  famotidine    Tablet 20 milliGRAM(s) Oral daily  glucagon  Injectable 1 milliGRAM(s) IntraMuscular once  metoprolol tartrate 50 milliGRAM(s) Oral two times a day  multivitamin 1 Tablet(s) Oral daily  polyethylene glycol 3350 17 Gram(s) Oral daily  senna 2 Tablet(s) Oral at bedtime  zinc sulfate 220 milliGRAM(s) Oral daily    MEDICATIONS  (PRN):      PHYSICAL EXAM:  GENERAL: NAD, well-groomed, well-developed  HEAD:  Atraumatic, Normocephalic  CHEST/LUNG: Clear to percussion bilaterally; No rales, rhonchi, wheezing, or rubs  HEART: Regular rate and rhythm; No murmurs, rubs, or gallops  ABDOMEN: Soft, Nontender, Nondistended; Bowel sounds present  EXTREMITIES:  2+ Peripheral Pulses, No clubbing, cyanosis, or edema  LYMPH: No lymphadenopathy noted  SKIN: No rashes or lesions    Consultant(s) Notes Reviewed:  [x ] YES  [ ] NO  Care Discussed with Consultants/Other Providers [ x] YES  [ ] NO    LABS:                        15.9   12.20 )-----------( 374      ( 02 Apr 2021 08:10 )             48.3     04-02    139  |  103  |  45<H>  ----------------------------<  117<H>  5.1   |  30  |  1.49<H>    Ca    8.5      02 Apr 2021 08:10    TPro  6.9  /  Alb  2.8<L>  /  TBili  0.8  /  DBili  0.2  /  AST  19  /  ALT  51<H>  /  AlkPhos  80  04-02        PT/INR - ( 02 Apr 2021 08:10 )   PT: 22.9 sec;   INR: 1.95 ratio               RADIOLOGY & ADDITIONAL TESTS:    Imaging Personally Reviewed:  [ ] YES  [ ] NO

## 2021-04-02 NOTE — PROGRESS NOTE ADULT - REASON FOR ADMISSION
covid PNA, hypoxia

## 2021-04-02 NOTE — PROGRESS NOTE ADULT - SUBJECTIVE AND OBJECTIVE BOX
Follow up for abnormal stress test  SUBJ:    comfortable    PMH  No pertinent past medical history    Pacemaker    Bradycardia        MEDICATIONS  (STANDING):  aMIOdarone    Tablet   Oral   aMIOdarone    Tablet 200 milliGRAM(s) Oral daily  amLODIPine   Tablet 2.5 milliGRAM(s) Oral daily  ascorbic acid 500 milliGRAM(s) Oral daily  aspirin  chewable 81 milliGRAM(s) Oral daily  atorvastatin 20 milliGRAM(s) Oral at bedtime  cholecalciferol 4000 Unit(s) Oral daily  dexAMETHasone     Tablet 6 milliGRAM(s) Oral daily  dextrose 40% Gel 15 Gram(s) Oral once  dextrose 5%. 1000 milliLiter(s) (50 mL/Hr) IV Continuous <Continuous>  dextrose 5%. 1000 milliLiter(s) (100 mL/Hr) IV Continuous <Continuous>  dextrose 50% Injectable 25 Gram(s) IV Push once  dextrose 50% Injectable 12.5 Gram(s) IV Push once  dextrose 50% Injectable 25 Gram(s) IV Push once  famotidine    Tablet 20 milliGRAM(s) Oral daily  glucagon  Injectable 1 milliGRAM(s) IntraMuscular once  metoprolol tartrate 50 milliGRAM(s) Oral two times a day  multivitamin 1 Tablet(s) Oral daily  polyethylene glycol 3350 17 Gram(s) Oral daily  senna 2 Tablet(s) Oral at bedtime  zinc sulfate 220 milliGRAM(s) Oral daily    MEDICATIONS  (PRN):        PHYSICAL EXAM:  Vital Signs Last 24 Hrs  T(C): 36.3 (02 Apr 2021 05:57), Max: 36.6 (01 Apr 2021 11:31)  T(F): 97.4 (02 Apr 2021 05:57), Max: 97.9 (01 Apr 2021 11:31)  HR: 52 (02 Apr 2021 05:57) (50 - 60)  BP: 155/83 (02 Apr 2021 05:57) (138/70 - 155/83)  BP(mean): --  RR: 20 (02 Apr 2021 05:57) (18 - 20)  SpO2: 95% (02 Apr 2021 05:57) (92% - 95%)    GENERAL: NAD, well-groomed, well-developed, moderate to severely over weigt  HEAD:  Atraumatic, Normocephalic  EYES: EOMI, PERRLA, conjunctiva and sclera clear  ENT: Moist mucous membranes,  NECK: Supple, No JVD, no bruits  CHEST/LUNG: Clear to percussion bilaterally; No rales, rhonchi, wheezing, or rubs  HEART: Regular rate and rhythm; No murmurs, rubs, or gallops PMI non displaced.  ABDOMEN: Soft, Nontender, Nondistended; Bowel sounds present  EXTREMITIES:  2+ Peripheral Pulses, No clubbing, cyanosis, or edema  SKIN: No rashes or lesions  NERVOUS SYSTEM:  Cranial Nerves II-XII intact      TELEMETRY:    ECG:    < from: 12 Lead ECG (03.28.21 @ 11:40) >  Diagnosis Line Atrial fibrillation with occasional ventricular-paced complexesand with premature ventricular or aberrantly conducted complexes      Confirmed by ARNOL CORTEZ MD ([id number]) on 3/29/2021 9:15:36 AM    < end of copied text >    ECHO:    LABS:    04-01    x   |  x   |  x   ----------------------------<  x   x    |  x   |  1.29      TPro  7.0  /  Alb  2.7<L>  /  TBili  0.6  /  DBili  0.1  /  AST  23  /  ALT  52<H>  /  AlkPhos  77  04-01        PT/INR - ( 01 Apr 2021 07:15 )   PT: 20.5 sec;   INR: 1.74 ratio             I&O's Summary    01 Apr 2021 07:01  -  02 Apr 2021 07:00  --------------------------------------------------------  IN: 118 mL / OUT: 1100 mL / NET: -982 mL      BNP    RADIOLOGY & ADDITIONAL STUDIES:    < from: NM Nuclear Stress Pharmacologic Multiple (04.01.21 @ 14:37) >  IMPRESSION:  Abnormal  SPECT Myocardial Perfusion Imaging post rest and post vasodilator suspicious for mild anterolateral ischemia.         Normal left ventricular wall motion with ejection fraction of 52 % (normal: 50% or greater).       No regional wall motion abnormalities.       TID present, may represent subendocardial ischemia      Please refer to cardiac stress test report.    Comparison: No prior study available.              LEON VILLALPANDO M.D., ATTENDING CARDIOLOGIST  This document has been electronically signed. Apr 1 2021  5:47PM    < end of copied text >      ECHO:       Follow up for abnormal stress test  SUBJ:    comfortable patient seen 4/1/21 7pm    PMH  No pertinent past medical history    Pacemaker    Bradycardia        MEDICATIONS  (STANDING):  aMIOdarone    Tablet   Oral   aMIOdarone    Tablet 200 milliGRAM(s) Oral daily  amLODIPine   Tablet 2.5 milliGRAM(s) Oral daily  ascorbic acid 500 milliGRAM(s) Oral daily  aspirin  chewable 81 milliGRAM(s) Oral daily  atorvastatin 20 milliGRAM(s) Oral at bedtime  cholecalciferol 4000 Unit(s) Oral daily  dexAMETHasone     Tablet 6 milliGRAM(s) Oral daily  dextrose 40% Gel 15 Gram(s) Oral once  dextrose 5%. 1000 milliLiter(s) (50 mL/Hr) IV Continuous <Continuous>  dextrose 5%. 1000 milliLiter(s) (100 mL/Hr) IV Continuous <Continuous>  dextrose 50% Injectable 25 Gram(s) IV Push once  dextrose 50% Injectable 12.5 Gram(s) IV Push once  dextrose 50% Injectable 25 Gram(s) IV Push once  famotidine    Tablet 20 milliGRAM(s) Oral daily  glucagon  Injectable 1 milliGRAM(s) IntraMuscular once  metoprolol tartrate 50 milliGRAM(s) Oral two times a day  multivitamin 1 Tablet(s) Oral daily  polyethylene glycol 3350 17 Gram(s) Oral daily  senna 2 Tablet(s) Oral at bedtime  zinc sulfate 220 milliGRAM(s) Oral daily    MEDICATIONS  (PRN):        PHYSICAL EXAM:  Vital Signs Last 24 Hrs  T(C): 36.3 (02 Apr 2021 05:57), Max: 36.6 (01 Apr 2021 11:31)  T(F): 97.4 (02 Apr 2021 05:57), Max: 97.9 (01 Apr 2021 11:31)  HR: 52 (02 Apr 2021 05:57) (50 - 60)  BP: 155/83 (02 Apr 2021 05:57) (138/70 - 155/83)  BP(mean): --  RR: 20 (02 Apr 2021 05:57) (18 - 20)  SpO2: 95% (02 Apr 2021 05:57) (92% - 95%)    GENERAL: NAD, well-groomed, well-developed, moderate to severely over weigt  HEAD:  Atraumatic, Normocephalic  EYES: EOMI, PERRLA, conjunctiva and sclera clear  ENT: Moist mucous membranes,  NECK: Supple, No JVD, no bruits  CHEST/LUNG: Clear to percussion bilaterally; No rales, rhonchi, wheezing, or rubs  HEART: Regular rate and rhythm; No murmurs, rubs, or gallops PMI non displaced.  ABDOMEN: Soft, Nontender, Nondistended; Bowel sounds present  EXTREMITIES:  2+ Peripheral Pulses, No clubbing, cyanosis, or edema  SKIN: No rashes or lesions  NERVOUS SYSTEM:  Cranial Nerves II-XII intact      TELEMETRY:    ECG:    < from: 12 Lead ECG (03.28.21 @ 11:40) >  Diagnosis Line Atrial fibrillation with occasional ventricular-paced complexesand with premature ventricular or aberrantly conducted complexes      Confirmed by ARNOL CORTEZ MD ([id number]) on 3/29/2021 9:15:36 AM    < end of copied text >    ECHO:    LABS:    04-01    x   |  x   |  x   ----------------------------<  x   x    |  x   |  1.29      TPro  7.0  /  Alb  2.7<L>  /  TBili  0.6  /  DBili  0.1  /  AST  23  /  ALT  52<H>  /  AlkPhos  77  04-01        PT/INR - ( 01 Apr 2021 07:15 )   PT: 20.5 sec;   INR: 1.74 ratio             I&O's Summary    01 Apr 2021 07:01  -  02 Apr 2021 07:00  --------------------------------------------------------  IN: 118 mL / OUT: 1100 mL / NET: -982 mL      BNP    RADIOLOGY & ADDITIONAL STUDIES:    < from: NM Nuclear Stress Pharmacologic Multiple (04.01.21 @ 14:37) >  IMPRESSION:  Abnormal  SPECT Myocardial Perfusion Imaging post rest and post vasodilator suspicious for mild anterolateral ischemia.         Normal left ventricular wall motion with ejection fraction of 52 % (normal: 50% or greater).       No regional wall motion abnormalities.       TID present, may represent subendocardial ischemia      Please refer to cardiac stress test report.    Comparison: No prior study available.              LEON VILLALPANDO M.D., ATTENDING CARDIOLOGIST  This document has been electronically signed. Apr 1 2021  5:47PM    < end of copied text >      ECHO:

## 2021-04-02 NOTE — DISCHARGE NOTE NURSING/CASE MANAGEMENT/SOCIAL WORK - NSDCFUADDAPPT_GEN_ALL_CORE_FT
Follow up telehealth appointment with Dr Elizabeth on 4/8/2021 at 11:30AM    Follow up with cardiology

## 2021-04-02 NOTE — PROGRESS NOTE ADULT - TIME BILLING
complexity of case including Af, COVID 19, WCT, use of anticoagulants, + stress testing and communication with other providers as above and patient.

## 2021-04-02 NOTE — DISCHARGE NOTE NURSING/CASE MANAGEMENT/SOCIAL WORK - PATIENT PORTAL LINK FT
You can access the FollowMyHealth Patient Portal offered by [facility] by registering at the following website: [email or web address]. By joining [facility]’s FollowMyHealth portal, you will also be able to view your health information using other applications (apps) compatible with our system.

## 2021-04-02 NOTE — PROGRESS NOTE ADULT - SUBJECTIVE AND OBJECTIVE BOX
Patient seen.  Case d/w Dr Cali, cath  [facility]. Patient asymptomatic, no c/p sob, palpitations.   PCR+ still.  Not accepting non urgent/emergent COLVID+ cases to lab as  cath labs are + pressure, relates to ID issues.  Could tx for care at [facility]  D/w patient in detail, and with Dr. Elizabeth, Dr. Gonzalez and CAROLYNE DIOP.    Patient opts for discharge, limited activities, medical rx and then elective cath when PCR negative.  Risks, benefits and alternatives discussed in detail, questions addressed.    Continue current cardiac rx. Could add statin as well.    Total time of encounter/coordination of care and communication with other providers 45 minutes

## 2021-04-02 NOTE — PROGRESS NOTE ADULT - NUTRITIONAL ASSESSMENT
Wide-complex tachycardia  Dr. Kang's noted appreciated Will proceed with pharmacological stress testing given possible underlying coronary disease the setting of occult illnessalso consider myocarditis due to viral causes. Currently on amiodarone hope to taper off given young age a patient when possible      care coordinate with wife dr jones and Kat Villavicencio called placed to Dr. Smith await callback
wct high risk stress test afib  An assessment of both structural and functional heart disease was recommended to the patient. In this regard, an echocardiogram and a stress test were advised to the patient. the stress test demonstrates highr risk features including ischemia and TID dilatation. We discussed the pros and cons of plain treadmill stress testing nuclear stress testing and angiography including a sensitivity analysis. This represents a high amount of medical decision making based upon  the recommendation for an invasive imaging technique with dye a cardiac catheterization. rba discussed     call placed to dr harrison await call back lindy mast and patient

## 2021-04-05 ENCOUNTER — TRANSCRIPTION ENCOUNTER (OUTPATIENT)
Age: 71
End: 2021-04-05

## 2021-04-07 ENCOUNTER — INPATIENT (INPATIENT)
Facility: HOSPITAL | Age: 71
LOS: 5 days | Discharge: HOME HEALTH | End: 2021-04-13
Attending: INTERNAL MEDICINE | Admitting: INTERNAL MEDICINE
Payer: MEDICARE

## 2021-04-07 VITALS
DIASTOLIC BLOOD PRESSURE: 51 MMHG | SYSTOLIC BLOOD PRESSURE: 137 MMHG | TEMPERATURE: 98 F | HEIGHT: 69 IN | RESPIRATION RATE: 18 BRPM | HEART RATE: 62 BPM | OXYGEN SATURATION: 99 %

## 2021-04-07 DIAGNOSIS — R94.39 ABNORMAL RESULT OF OTHER CARDIOVASCULAR FUNCTION STUDY: ICD-10-CM

## 2021-04-07 DIAGNOSIS — U07.1 COVID-19: ICD-10-CM

## 2021-04-07 DIAGNOSIS — R42 DIZZINESS AND GIDDINESS: ICD-10-CM

## 2021-04-07 DIAGNOSIS — I48.91 UNSPECIFIED ATRIAL FIBRILLATION: ICD-10-CM

## 2021-04-07 DIAGNOSIS — Z95.0 PRESENCE OF CARDIAC PACEMAKER: Chronic | ICD-10-CM

## 2021-04-07 DIAGNOSIS — I47.1 SUPRAVENTRICULAR TACHYCARDIA: ICD-10-CM

## 2021-04-07 PROBLEM — R00.1 BRADYCARDIA, UNSPECIFIED: Chronic | Status: ACTIVE | Noted: 2021-03-24

## 2021-04-07 LAB
ALBUMIN SERPL ELPH-MCNC: 3.2 G/DL — LOW (ref 3.3–5)
ALP SERPL-CCNC: 73 U/L — SIGNIFICANT CHANGE UP (ref 40–120)
ALT FLD-CCNC: 40 U/L — SIGNIFICANT CHANGE UP (ref 4–41)
ANION GAP SERPL CALC-SCNC: 11 MMOL/L — SIGNIFICANT CHANGE UP (ref 7–14)
APTT BLD: 33.4 SEC — SIGNIFICANT CHANGE UP (ref 27–36.3)
AST SERPL-CCNC: 38 U/L — SIGNIFICANT CHANGE UP (ref 4–40)
BASOPHILS # BLD AUTO: 0.03 K/UL — SIGNIFICANT CHANGE UP (ref 0–0.2)
BASOPHILS NFR BLD AUTO: 0.2 % — SIGNIFICANT CHANGE UP (ref 0–2)
BILIRUB SERPL-MCNC: 0.6 MG/DL — SIGNIFICANT CHANGE UP (ref 0.2–1.2)
BUN SERPL-MCNC: 32 MG/DL — HIGH (ref 7–23)
CALCIUM SERPL-MCNC: 8.2 MG/DL — LOW (ref 8.4–10.5)
CHLORIDE SERPL-SCNC: 100 MMOL/L — SIGNIFICANT CHANGE UP (ref 98–107)
CO2 SERPL-SCNC: 21 MMOL/L — LOW (ref 22–31)
CREAT SERPL-MCNC: 1.12 MG/DL — SIGNIFICANT CHANGE UP (ref 0.5–1.3)
EOSINOPHIL # BLD AUTO: 0.05 K/UL — SIGNIFICANT CHANGE UP (ref 0–0.5)
EOSINOPHIL NFR BLD AUTO: 0.3 % — SIGNIFICANT CHANGE UP (ref 0–6)
GLUCOSE SERPL-MCNC: 96 MG/DL — SIGNIFICANT CHANGE UP (ref 70–99)
HCT VFR BLD CALC: 43.7 % — SIGNIFICANT CHANGE UP (ref 39–50)
HGB BLD-MCNC: 14.3 G/DL — SIGNIFICANT CHANGE UP (ref 13–17)
IANC: 13.87 K/UL — HIGH (ref 1.5–8.5)
IMM GRANULOCYTES NFR BLD AUTO: 0.6 % — SIGNIFICANT CHANGE UP (ref 0–1.5)
INR BLD: 2.23 RATIO — HIGH (ref 0.88–1.16)
LYMPHOCYTES # BLD AUTO: 1.4 K/UL — SIGNIFICANT CHANGE UP (ref 1–3.3)
LYMPHOCYTES # BLD AUTO: 8 % — LOW (ref 13–44)
MCHC RBC-ENTMCNC: 29.4 PG — SIGNIFICANT CHANGE UP (ref 27–34)
MCHC RBC-ENTMCNC: 32.7 GM/DL — SIGNIFICANT CHANGE UP (ref 32–36)
MCV RBC AUTO: 89.7 FL — SIGNIFICANT CHANGE UP (ref 80–100)
MONOCYTES # BLD AUTO: 2.01 K/UL — HIGH (ref 0–0.9)
MONOCYTES NFR BLD AUTO: 11.5 % — SIGNIFICANT CHANGE UP (ref 2–14)
NEUTROPHILS # BLD AUTO: 13.87 K/UL — HIGH (ref 1.8–7.4)
NEUTROPHILS NFR BLD AUTO: 79.4 % — HIGH (ref 43–77)
NRBC # BLD: 0 /100 WBCS — SIGNIFICANT CHANGE UP
NRBC # FLD: 0 K/UL — SIGNIFICANT CHANGE UP
NT-PROBNP SERPL-SCNC: 277 PG/ML — SIGNIFICANT CHANGE UP
PLATELET # BLD AUTO: 235 K/UL — SIGNIFICANT CHANGE UP (ref 150–400)
POTASSIUM SERPL-MCNC: SIGNIFICANT CHANGE UP MMOL/L (ref 3.5–5.3)
POTASSIUM SERPL-SCNC: SIGNIFICANT CHANGE UP MMOL/L (ref 3.5–5.3)
PROT SERPL-MCNC: 6.5 G/DL — SIGNIFICANT CHANGE UP (ref 6–8.3)
PROTHROM AB SERPL-ACNC: 24.5 SEC — HIGH (ref 10.6–13.6)
RBC # BLD: 4.87 M/UL — SIGNIFICANT CHANGE UP (ref 4.2–5.8)
RBC # FLD: 13.8 % — SIGNIFICANT CHANGE UP (ref 10.3–14.5)
SARS-COV-2 RNA SPEC QL NAA+PROBE: DETECTED
SODIUM SERPL-SCNC: 132 MMOL/L — LOW (ref 135–145)
TROPONIN T, HIGH SENSITIVITY RESULT: 27 NG/L — SIGNIFICANT CHANGE UP
TROPONIN T, HIGH SENSITIVITY RESULT: 27 NG/L — SIGNIFICANT CHANGE UP
WBC # BLD: 17.46 K/UL — HIGH (ref 3.8–10.5)
WBC # FLD AUTO: 17.46 K/UL — HIGH (ref 3.8–10.5)

## 2021-04-07 PROCEDURE — 99285 EMERGENCY DEPT VISIT HI MDM: CPT | Mod: CS,25

## 2021-04-07 PROCEDURE — 93010 ELECTROCARDIOGRAM REPORT: CPT

## 2021-04-07 PROCEDURE — 99223 1ST HOSP IP/OBS HIGH 75: CPT | Mod: CS

## 2021-04-07 PROCEDURE — 71045 X-RAY EXAM CHEST 1 VIEW: CPT | Mod: 26

## 2021-04-07 RX ORDER — CHOLECALCIFEROL (VITAMIN D3) 125 MCG
2000 CAPSULE ORAL DAILY
Refills: 0 | Status: DISCONTINUED | OUTPATIENT
Start: 2021-04-07 | End: 2021-04-13

## 2021-04-07 RX ORDER — ATORVASTATIN CALCIUM 80 MG/1
20 TABLET, FILM COATED ORAL AT BEDTIME
Refills: 0 | Status: DISCONTINUED | OUTPATIENT
Start: 2021-04-07 | End: 2021-04-13

## 2021-04-07 RX ORDER — METOPROLOL TARTRATE 50 MG
50 TABLET ORAL
Refills: 0 | Status: DISCONTINUED | OUTPATIENT
Start: 2021-04-07 | End: 2021-04-13

## 2021-04-07 RX ORDER — ASPIRIN/CALCIUM CARB/MAGNESIUM 324 MG
162 TABLET ORAL ONCE
Refills: 0 | Status: COMPLETED | OUTPATIENT
Start: 2021-04-07 | End: 2021-04-07

## 2021-04-07 RX ORDER — LOSARTAN POTASSIUM 100 MG/1
25 TABLET, FILM COATED ORAL DAILY
Refills: 0 | Status: DISCONTINUED | OUTPATIENT
Start: 2021-04-07 | End: 2021-04-13

## 2021-04-07 RX ORDER — ASCORBIC ACID 60 MG
500 TABLET,CHEWABLE ORAL DAILY
Refills: 0 | Status: DISCONTINUED | OUTPATIENT
Start: 2021-04-07 | End: 2021-04-13

## 2021-04-07 RX ORDER — ONDANSETRON 8 MG/1
4 TABLET, FILM COATED ORAL EVERY 6 HOURS
Refills: 0 | Status: DISCONTINUED | OUTPATIENT
Start: 2021-04-07 | End: 2021-04-13

## 2021-04-07 RX ORDER — HEPARIN SODIUM 5000 [USP'U]/ML
5000 INJECTION INTRAVENOUS; SUBCUTANEOUS EVERY 6 HOURS
Refills: 0 | Status: DISCONTINUED | OUTPATIENT
Start: 2021-04-07 | End: 2021-04-12

## 2021-04-07 RX ORDER — ASPIRIN/CALCIUM CARB/MAGNESIUM 324 MG
81 TABLET ORAL DAILY
Refills: 0 | Status: DISCONTINUED | OUTPATIENT
Start: 2021-04-07 | End: 2021-04-13

## 2021-04-07 RX ORDER — AMIODARONE HYDROCHLORIDE 400 MG/1
200 TABLET ORAL DAILY
Refills: 0 | Status: DISCONTINUED | OUTPATIENT
Start: 2021-04-07 | End: 2021-04-13

## 2021-04-07 RX ORDER — HEPARIN SODIUM 5000 [USP'U]/ML
INJECTION INTRAVENOUS; SUBCUTANEOUS
Qty: 25000 | Refills: 0 | Status: DISCONTINUED | OUTPATIENT
Start: 2021-04-07 | End: 2021-04-12

## 2021-04-07 RX ORDER — HEPARIN SODIUM 5000 [USP'U]/ML
10000 INJECTION INTRAVENOUS; SUBCUTANEOUS EVERY 6 HOURS
Refills: 0 | Status: DISCONTINUED | OUTPATIENT
Start: 2021-04-07 | End: 2021-04-12

## 2021-04-07 RX ORDER — FAMOTIDINE 10 MG/ML
20 INJECTION INTRAVENOUS DAILY
Refills: 0 | Status: DISCONTINUED | OUTPATIENT
Start: 2021-04-07 | End: 2021-04-13

## 2021-04-07 RX ADMIN — HEPARIN SODIUM 2400 UNIT(S)/HR: 5000 INJECTION INTRAVENOUS; SUBCUTANEOUS at 18:47

## 2021-04-07 RX ADMIN — ATORVASTATIN CALCIUM 20 MILLIGRAM(S): 80 TABLET, FILM COATED ORAL at 22:42

## 2021-04-07 RX ADMIN — Medication 162 MILLIGRAM(S): at 13:52

## 2021-04-07 RX ADMIN — Medication 50 MILLIGRAM(S): at 18:47

## 2021-04-07 NOTE — ED PROVIDER NOTE - ATTENDING CONTRIBUTION TO CARE
70 yr old male with PMHx of bradycardia s/p pacemaker, recent admission to [facility] for covid/hypoxia, during admission had episode fo afib and then vtach, cardiac cath deferred due to covid positive status, pt had episodes of exertional sob without chest pain yesterday and was told by his cardiologist to come to [facility]. pt currently asymptomatic, will check labs, trop, dw cards re dispo for cath  ekg nsr, no signs of ischemia   cv rrr, lungs clear, no leg swelling or tendenress 70 yr old male with PMHx of bradycardia s/p pacemaker, recent admission to [facility] for covid/hypoxia, during admission had episode fo afib and then vtach, and and abnormal stress, cardiac cath deferred due to covid positive status, pt had episodes of exertional sob without chest pain yesterday and was told by his cardiologist to come to [facility]. pt currently asymptomatic, will check labs, trop, dw cards re dispo for cath  ekg nsr, no signs of ischemia   cv rrr, lungs clear, no leg swelling or tendenress

## 2021-04-07 NOTE — ED ADULT NURSE NOTE - CHIEF COMPLAINT QUOTE
states " I am having SOB since few days" denies CP.  COVID  positive on  March 17 and was seen at [facility] and sent home." h/o pacemaker for slow heart rate

## 2021-04-07 NOTE — H&P ADULT - NSHPPHYSICALEXAM_GEN_ALL_CORE
T(C): 36.7 (04-07-21 @ 15:38), Max: 36.8 (04-07-21 @ 12:07)  HR: 99 (04-07-21 @ 15:38) (62 - 99)  BP: 145/58 (04-07-21 @ 15:38) (137/51 - 145/76)  RR: 22 (04-07-21 @ 15:38) (18 - 22)  SpO2: 98% (04-07-21 @ 15:38) (97% - 99%)    GENERAL: No acute distress, well-developed  HEAD:  Atraumatic, Normocephalic  ENT: EOMI, PERRLA, conjunctiva and sclera clear, Neck supple, No JVD, moist mucosa, no pharyngeal erythema, no tonsillar enlargement or exudate  CHEST/LUNG: Clear to auscultation bilaterally; No wheeze, equal breath sounds bilaterally   HEART: Regular rate and rhythm; No murmurs, rubs, or gallops  ABDOMEN: Soft, Nontender, Nondistended; Bowel sounds present, no organomegaly  EXTREMITIES:  2+ Peripheral Pulses, No clubbing, cyanosis, or edema  PSYCH: AAOx3, normal affect, normal behavior   NEUROLOGY: non-focal, cranial nerves intact  SKIN: Normal color, No rashes or lesions

## 2021-04-07 NOTE — ED ADULT TRIAGE NOTE - CHIEF COMPLAINT QUOTE
states " I am having SOB since few days" denies CP.  COVID  positive on  March 17 and was seen at [place] and sent home." h/o pacemaker for slow heart rate

## 2021-04-07 NOTE — ED PROVIDER NOTE - PROGRESS NOTE DETAILS
CHIKIS Diaz: Tele doc consulted for general cards and recs appreciated, pt to be admitted to Dr. Flowers. EP to be consulted (Dr. Gonzalez). MAR text paged at this time.

## 2021-04-07 NOTE — H&P ADULT - NSHPLABSRESULTS_GEN_ALL_CORE
.  LABS:                         14.3   17.46 )-----------( 235      ( 07 Apr 2021 14:22 )             43.7     04-07    132<L>  |  100  |  32<H>  ----------------------------<  96  TNP   |  21<L>  |  1.12    Ca    8.2<L>      07 Apr 2021 14:22    TPro  6.5  /  Alb  3.2<L>  /  TBili  0.6  /  DBili  x   /  AST  38  /  ALT  40  /  AlkPhos  73  04-07    PT/INR - ( 07 Apr 2021 14:22 )   PT: 24.5 sec;   INR: 2.23 ratio         PTT - ( 07 Apr 2021 14:22 )  PTT:33.4 sec        Serum Pro-Brain Natriuretic Peptide: 277 pg/mL (04-07 @ 14:22)        RADIOLOGY, EKG & ADDITIONAL TESTS: Reviewed.

## 2021-04-07 NOTE — ED PROVIDER NOTE - CLINICAL SUMMARY MEDICAL DECISION MAKING FREE TEXT BOX
70 year old male with PMH of Bradycardia (w/ pacemaker) presents to the ED complaining of shortness of breath and dizziness since last night. Recently admitted at [place] for Covid-19 hypoxia and his hospital course was complicated by A. Fib and Vtach, he had an abnormal nuclear stress test which was abnormal and a Cardiac Cath was being considered on a non-emergent basis. HD stable, no hypoxia. EKG non-ischemic. Concern for ACS. Plan for labs, cardiac monitor and Tele doc consult.

## 2021-04-07 NOTE — ED PROVIDER NOTE - PHYSICAL EXAMINATION
Gen: Well appearing in NAD  Head: NC/AT  Neck: trachea midline  cv: rrr, no peripheral edema  Resp:  No distress, lungs clear  abd: nontender  Ext: no deformities  Neuro:  A&O appears non focal  Skin:  Warm and dry as visualized  Psych:  Normal affect and mood

## 2021-04-07 NOTE — CONSULT NOTE ADULT - SUBJECTIVE AND OBJECTIVE BOX
HISTORY OF PRESENT ILLNESS: HPI:    70 year old male PMH PPM 2/2 bradycardia, otherwise no prior hx, recently admitted to [facility] on march 24th with COVID PNA, was found in [facility] AF and started on Xarelto.  During that hospitalization he was noted with 18 beats NSVT and underwent a NST on 4/1 which revealed mild anterolateral ischemia and TID.  He was not a candidate for Trx for [facility] given COVID+ status so he started on medical management.  He was discharged home on 4/2 and presented today to the ER with SOB and dizziness since last night.  He denies chest pain.  He denies LOC, fever, chills, cough or bleeding.      PAST MEDICAL & SURGICAL HISTORY:  Pacemaker    Bradycardia      MEDICATIONS  (STANDING): Xarelto 15, Asa 81, Amio, Lipitor, Lopressor, Losartan, decadron, Pepcid      Allergies  No Known Allergies      FAMILY HISTORY:  FH: HTN (hypertension)  FH: type 2 diabetes  Noncontributory for premature coronary disease or sudden cardiac death    SOCIAL HISTORY:    [ x] Non-smoker  [ ] Smoker  [ ] Alcohol    FLU VACCINE THIS YEAR STARTS IN AUGUST:  [ ] Yes    [ ] No    IF OVER 65 HAVE YOU EVER HAD A PNA VACCINE:  [ ] Yes    [ ] No       [ ] N/A      REVIEW OF SYSTEMS:  [ ]chest pain  [ x ]shortness of breath  [  ]palpitations  [  ]syncope  [ ]near syncope [ ]upper extremity weakness   [ ] lower extremity weakness  [  ]diplopia  [  ]altered mental status   [  ]fevers  [ ]chills [ ]nausea  [ ]vomitting  [  ]dysphagia    [ ]abdominal pain  [ ]melena  [ ]BRBPR    [  ]epistaxis  [  ]rash    [ ]lower extremity edema        [x ] All others negative	  [ ] Unable to obtain      LABS:	 	    CARDIAC MARKERS:  Trop T 27, 27                            14.3   17.46 )-----------( 235      ( 07 Apr 2021 14:22 )             43.7     132<L>  |  100  |  32<H>  ----------------------------<  96  TNP   |  21<L>  |  1.12    Ca    8.2<L>      07 Apr 2021 14:22    TPro  6.5  /  Alb  3.2<L>  /  TBili  0.6  /  DBili  x   /  AST  38  /  ALT  40  /  AlkPhos  73  04-07    Creatinine Trend: 1.12<--, 1.49<--, 1.29<--, 1.24<--, 1.23<--, 1.15<--    Coags:  PT/INR - ( 07 Apr 2021 14:22 )   PT: 24.5 sec;   INR: 2.23 ratio      PTT - ( 07 Apr 2021 14:22 )  PTT:33.4 sec    proBNP: Serum Pro-Brain Natriuretic Peptide: 277 pg/mL (04-07 @ 14:22)    PHYSICAL EXAM:  T(C): 36.7 (04-07-21 @ 15:38), Max: 36.8 (04-07-21 @ 12:07)  HR: 99 (04-07-21 @ 15:38) (62 - 99)  BP: 145/58 (04-07-21 @ 15:38) (137/51 - 145/76)  RR: 22 (04-07-21 @ 15:38) (18 - 22)  SpO2: 98% (04-07-21 @ 15:38) (97% - 99%)  Wt(kg): --   BMI (kg/m2): 47.2 (04-07-21 @ 12:07)    Gen: Appears well in NAD  HEENT:  (-)icterus (-)pallor  CV: N S1 S2 1/6 MICHELLE (+)2 Pulses B/l  Resp:  coarse BS BL  GI: (+) BS Soft, NT, ND  Lymph:  (-)Edema, (-)obvious lymphadenopathy  Skin: Warm to touch, Normal turgor  Psych: Appropriate mood and affect	      ECG:  	NSR     < from: NM Nuclear Stress Pharmacologic Multiple (04.01.21 @ 14:37) >  IMPRESSION:  Abnormal  SPECT Myocardial Perfusion Imaging post rest and post vasodilator suspicious for mild anterolateral ischemia.         Normal left ventricular wall motion with ejection fraction of 52 % (normal: 50% or greater).       No regional wall motion abnormalities.       TID present, may represent subendocardial ischemia      Please refer to cardiac stress test report.    Comparison: No prior study available.    < end of copied text >    < from: TTE Echo Complete w/o Contrast w/ Doppler (03.26.21 @ 13:55) >  Summary:   1. Left ventricular ejection fraction, by visual estimation, is 50 to 55%.   2. Mildly decreased global left ventricular systolic function.   3. Mildly increased LV wall thickness.   4. Normal left ventricular internal cavity size.   5. There is mild concentric left ventricular hypertrophy.   6. Mildly enlarged left atrium.   7. Thickening of the anterior and posterior mitral valve leaflets.   8. Trace mitral valve regurgitation.   9. Mild-moderate tricuspid regurgitation.  10. Estimated pulmonary artery systolic pressure is 51.6 mmHg assuming a right atrial pressure of 10 mmHg, which is consistent with moderate pulmonary hypertension.    < end of copied text >      ASSESSMENT/PLAN: 	70 year old male PMH PPM 2/2 bradycardia, otherwise no prior hx, recently admitted to [facility] on march 24th with COVID PNA, was found in NEW AF and started on Xarelto.  During that hospitalization he was noted with 18 beats NSVT and underwent a NST on 4/1 which revealed mild anterolateral ischemia and TID.  He was not a candidate for Trx for [facility] given COVID+ status so he started on medical management.  He was discharged home on 4/2 and presented today to the ER with SOB and dizziness since last night.     --Admit to tele  --Pt with indeterminate Trop T but no acute ischemic EKG changes  --monitor 02 sats  --ID re eval given CXR findings and prior covid PNA  --repeat COVID test  --Cardio chart note from 4/2* noted  --repeat TTE  --HOld Xarelto and start Heparin Gtt in anticipation of further cardiac work up pending above  --eventual C

## 2021-04-07 NOTE — H&P ADULT - ASSESSMENT
70 M with h/o bradycardia s/p ppm, recent admission for hypoxia secondary to COVID-19 complicated by Afib/V tach presenting with shortness of breath and lightheadedness.

## 2021-04-07 NOTE — ED PROVIDER NOTE - OBJECTIVE STATEMENT
70 year old male with PMH of Bradycardia (w/ pacemaker) presents to the ED complaining of shortness of breath and dizziness since last night. Pt states he was recently admitted at [place] for Covid-19 hypoxia and his hospital course was complicated by A. Fib and Vtach, he had an abnormal nuclear stress test which was abnormal and a Cardiac Cath was being considered on a non-emergent basis. Cardiologist Mohamud Gaston referred to the ED. Denies chest pain, syncope, weakness, numbness or tingling sensation. Denies any other complaints.

## 2021-04-07 NOTE — H&P ADULT - PROBLEM SELECTOR PLAN 3
- Completed course Decadron and Remdesivir  - Symptoms improved. No longer hypoxic  - Will continue to monitor. Supportive care

## 2021-04-07 NOTE — H&P ADULT - PROBLEM SELECTOR PLAN 2
- Pt declined cardiac cath last admission but amenable now   - Monitor on tele   - Cardiology consult in AM

## 2021-04-07 NOTE — ED ADULT NURSE NOTE - OBJECTIVE STATEMENT
pt received to room 10, a&ox 4, ambulatory, pmh of HTN, PPM, HLD, p/w dizziness and SOB since last night. Pt breathing even and unlabored on room air, satting 97-99%. NSR, paced on demand on cardiac monitor. Denies fever, chills, cough, chest pain, palpitations, dizziness, N/V/D, constipation, numbness, tingling. Abdomen soft, nontender. EKG in chart. pending MD cochran and orders.

## 2021-04-07 NOTE — H&P ADULT - NSHPREVIEWOFSYSTEMS_GEN_ALL_CORE
REVIEW OF SYSTEMS:    CONSTITUTIONAL: No weakness, fevers or chills, no weight loss  EYES/ENT: No visual changes;  No dysphagia or odynophagia, no tinnitus  NECK: No pain or stiffness  RESPIRATORY: No cough, wheezing, hemoptysis; + shortness of breath  CARDIOVASCULAR: No chest pain or palpitations; No lower extremity edema  GASTROINTESTINAL: No abdominal or epigastric pain. No nausea, vomiting, or hematemesis; No diarrhea or constipation. No melena or hematochezia.  MUSCULOSKELETAL: No joint pain, swelling, erythema or warmth, no back pain  GENITOURINARY: No dysuria, frequency or hematuria, no suprapubic pain  NEUROLOGICAL: No numbness or weakness, no headache, no syncope, no gait abnormalities   SKIN: No itching, burning, rashes, or lesions   All other review of systems is negative unless indicated above.

## 2021-04-07 NOTE — H&P ADULT - HISTORY OF PRESENT ILLNESS
70 M with h/o bradycardia s/p ppm, recent admission for hypoxia secondary to COVID-19 complicated by Afib and SVT presenting with shortness of breath and lightheadedness. Pt was treated for COVID with decadron and Remdesivir and symptoms improved besides some residual shortness of breath. Last night he felt lightheaded and called his cardiologist who advised him to come to the ED. He denies any chest pain or palpitations, no fevers or chills, no cough, no GI or  symptoms.      In ED pt was given ASA 162mg   VS:  145/58  99  98.0  22  98% on RA

## 2021-04-07 NOTE — H&P ADULT - PROBLEM SELECTOR PLAN 1
- Etiology unclear. Pt had recent abnormal stress. Also possible this may be arrhythmia related  - Trops flat  - Monitor on tele   - Consult cardiology in AM

## 2021-04-08 LAB
ALBUMIN SERPL ELPH-MCNC: 3.1 G/DL — LOW (ref 3.3–5)
ALP SERPL-CCNC: 71 U/L — SIGNIFICANT CHANGE UP (ref 40–120)
ALT FLD-CCNC: 31 U/L — SIGNIFICANT CHANGE UP (ref 4–41)
ANION GAP SERPL CALC-SCNC: 10 MMOL/L — SIGNIFICANT CHANGE UP (ref 7–14)
APTT BLD: 154.4 SEC — CRITICAL HIGH (ref 27–36.3)
APTT BLD: >200 SEC — CRITICAL HIGH (ref 27–36.3)
APTT BLD: >200 SEC — CRITICAL HIGH (ref 27–36.3)
AST SERPL-CCNC: 12 U/L — SIGNIFICANT CHANGE UP (ref 4–40)
BASOPHILS # BLD AUTO: 0.02 K/UL — SIGNIFICANT CHANGE UP (ref 0–0.2)
BASOPHILS NFR BLD AUTO: 0.1 % — SIGNIFICANT CHANGE UP (ref 0–2)
BILIRUB SERPL-MCNC: 0.7 MG/DL — SIGNIFICANT CHANGE UP (ref 0.2–1.2)
BUN SERPL-MCNC: 29 MG/DL — HIGH (ref 7–23)
CALCIUM SERPL-MCNC: 8.3 MG/DL — LOW (ref 8.4–10.5)
CHLORIDE SERPL-SCNC: 101 MMOL/L — SIGNIFICANT CHANGE UP (ref 98–107)
CO2 SERPL-SCNC: 25 MMOL/L — SIGNIFICANT CHANGE UP (ref 22–31)
COVID-19 SPIKE DOMAIN AB INTERP: POSITIVE
COVID-19 SPIKE DOMAIN ANTIBODY RESULT: >250 U/ML — HIGH
CREAT SERPL-MCNC: 1.3 MG/DL — SIGNIFICANT CHANGE UP (ref 0.5–1.3)
EOSINOPHIL # BLD AUTO: 0.15 K/UL — SIGNIFICANT CHANGE UP (ref 0–0.5)
EOSINOPHIL NFR BLD AUTO: 1 % — SIGNIFICANT CHANGE UP (ref 0–6)
GLUCOSE SERPL-MCNC: 96 MG/DL — SIGNIFICANT CHANGE UP (ref 70–99)
HCT VFR BLD CALC: 39.1 % — SIGNIFICANT CHANGE UP (ref 39–50)
HCT VFR BLD CALC: 40 % — SIGNIFICANT CHANGE UP (ref 39–50)
HGB BLD-MCNC: 12.8 G/DL — LOW (ref 13–17)
HGB BLD-MCNC: 13 G/DL — SIGNIFICANT CHANGE UP (ref 13–17)
IANC: 11.12 K/UL — HIGH (ref 1.5–8.5)
IMM GRANULOCYTES NFR BLD AUTO: 0.8 % — SIGNIFICANT CHANGE UP (ref 0–1.5)
LYMPHOCYTES # BLD AUTO: 1.58 K/UL — SIGNIFICANT CHANGE UP (ref 1–3.3)
LYMPHOCYTES # BLD AUTO: 10.7 % — LOW (ref 13–44)
MAGNESIUM SERPL-MCNC: 2.2 MG/DL — SIGNIFICANT CHANGE UP (ref 1.6–2.6)
MCHC RBC-ENTMCNC: 29.1 PG — SIGNIFICANT CHANGE UP (ref 27–34)
MCHC RBC-ENTMCNC: 29.5 PG — SIGNIFICANT CHANGE UP (ref 27–34)
MCHC RBC-ENTMCNC: 32.5 GM/DL — SIGNIFICANT CHANGE UP (ref 32–36)
MCHC RBC-ENTMCNC: 32.7 GM/DL — SIGNIFICANT CHANGE UP (ref 32–36)
MCV RBC AUTO: 89.5 FL — SIGNIFICANT CHANGE UP (ref 80–100)
MCV RBC AUTO: 90.1 FL — SIGNIFICANT CHANGE UP (ref 80–100)
MONOCYTES # BLD AUTO: 1.83 K/UL — HIGH (ref 0–0.9)
MONOCYTES NFR BLD AUTO: 12.3 % — SIGNIFICANT CHANGE UP (ref 2–14)
NEUTROPHILS # BLD AUTO: 11.12 K/UL — HIGH (ref 1.8–7.4)
NEUTROPHILS NFR BLD AUTO: 75.1 % — SIGNIFICANT CHANGE UP (ref 43–77)
NRBC # BLD: 0 /100 WBCS — SIGNIFICANT CHANGE UP
NRBC # BLD: 0 /100 WBCS — SIGNIFICANT CHANGE UP
NRBC # FLD: 0 K/UL — SIGNIFICANT CHANGE UP
NRBC # FLD: 0 K/UL — SIGNIFICANT CHANGE UP
PHOSPHATE SERPL-MCNC: 3.3 MG/DL — SIGNIFICANT CHANGE UP (ref 2.5–4.5)
PLATELET # BLD AUTO: 197 K/UL — SIGNIFICANT CHANGE UP (ref 150–400)
PLATELET # BLD AUTO: 207 K/UL — SIGNIFICANT CHANGE UP (ref 150–400)
POTASSIUM SERPL-MCNC: 4.2 MMOL/L — SIGNIFICANT CHANGE UP (ref 3.5–5.3)
POTASSIUM SERPL-SCNC: 4.2 MMOL/L — SIGNIFICANT CHANGE UP (ref 3.5–5.3)
PROT SERPL-MCNC: 5.9 G/DL — LOW (ref 6–8.3)
RBC # BLD: 4.34 M/UL — SIGNIFICANT CHANGE UP (ref 4.2–5.8)
RBC # BLD: 4.47 M/UL — SIGNIFICANT CHANGE UP (ref 4.2–5.8)
RBC # FLD: 13.8 % — SIGNIFICANT CHANGE UP (ref 10.3–14.5)
RBC # FLD: 13.9 % — SIGNIFICANT CHANGE UP (ref 10.3–14.5)
SARS-COV-2 IGG+IGM SERPL QL IA: >250 U/ML — HIGH
SARS-COV-2 IGG+IGM SERPL QL IA: POSITIVE
SODIUM SERPL-SCNC: 136 MMOL/L — SIGNIFICANT CHANGE UP (ref 135–145)
WBC # BLD: 14.82 K/UL — HIGH (ref 3.8–10.5)
WBC # BLD: 15.19 K/UL — HIGH (ref 3.8–10.5)
WBC # FLD AUTO: 14.82 K/UL — HIGH (ref 3.8–10.5)
WBC # FLD AUTO: 15.19 K/UL — HIGH (ref 3.8–10.5)

## 2021-04-08 PROCEDURE — 71275 CT ANGIOGRAPHY CHEST: CPT | Mod: 26

## 2021-04-08 PROCEDURE — 93306 TTE W/DOPPLER COMPLETE: CPT | Mod: 26

## 2021-04-08 RX ADMIN — Medication 81 MILLIGRAM(S): at 12:49

## 2021-04-08 RX ADMIN — Medication 500 MILLIGRAM(S): at 12:49

## 2021-04-08 RX ADMIN — HEPARIN SODIUM 1600 UNIT(S)/HR: 5000 INJECTION INTRAVENOUS; SUBCUTANEOUS at 20:30

## 2021-04-08 RX ADMIN — Medication 1 TABLET(S): at 12:49

## 2021-04-08 RX ADMIN — HEPARIN SODIUM 0 UNIT(S)/HR: 5000 INJECTION INTRAVENOUS; SUBCUTANEOUS at 11:14

## 2021-04-08 RX ADMIN — Medication 50 MILLIGRAM(S): at 17:40

## 2021-04-08 RX ADMIN — HEPARIN SODIUM 0 UNIT(S)/HR: 5000 INJECTION INTRAVENOUS; SUBCUTANEOUS at 19:31

## 2021-04-08 RX ADMIN — Medication 2000 UNIT(S): at 12:49

## 2021-04-08 RX ADMIN — FAMOTIDINE 20 MILLIGRAM(S): 10 INJECTION INTRAVENOUS at 12:50

## 2021-04-08 RX ADMIN — ATORVASTATIN CALCIUM 20 MILLIGRAM(S): 80 TABLET, FILM COATED ORAL at 22:37

## 2021-04-08 RX ADMIN — HEPARIN SODIUM 2000 UNIT(S)/HR: 5000 INJECTION INTRAVENOUS; SUBCUTANEOUS at 12:19

## 2021-04-08 RX ADMIN — AMIODARONE HYDROCHLORIDE 200 MILLIGRAM(S): 400 TABLET ORAL at 06:11

## 2021-04-08 RX ADMIN — LOSARTAN POTASSIUM 25 MILLIGRAM(S): 100 TABLET, FILM COATED ORAL at 06:11

## 2021-04-08 NOTE — PROGRESS NOTE ADULT - SUBJECTIVE AND OBJECTIVE BOX
chief complaint: SOB    extended hpi:  70 year old male PMH PPM 2/2 bradycardia, otherwise no prior hx, recently admitted to [facility] on march 24th with COVID PNA, was found in NEW AF and started on Xarelto.  During that hospitalization he was noted with 18 beats NSVT and underwent a NST on 4/1 which revealed mild anterolateral ischemia and TID.  He was not a candidate for Trx for [facility] given COVID+ status so he started on medical management.  He was discharged home on 4/2 and presented today to the ER with SOB and dizziness since last night.     Today denies CP SOB or palps.  ROS otherwise -    Review of Systems:   Constitutional: [ ] fevers, [ ] chills.   Skin: [ ] dry skin. [ ] rashes.  Psychiatric: [ ] depression, [ ] anxiety.   Gastrointestinal: [ ] BRBPR, [ ] melena.   Neurological: [ ] confusion. [ ] seizures. [ ] shuffling gait.   Ears,Nose,Mouth and Throat: [ ] ear pain [ ] sore throat.   Eyes: [ ] diplopia.   Respiratory: [ ] hemoptysis. [ ] shortness of breath  Cardiovascular: See HPI above  Hematologic/Lymphatic: [ ] anemia. [ ] painful nodes. [ ] prolonged bleeding.   Genitourinary: [ ] hematuria. [ ] flank pain.   Endocrine: [ ] significant change in weight. [ ] intolerance to heat and cold.     Review of systems [x ] otherwise negative, [ ] otherwise unable to obtain    FH: no family history of sudden cardiac death in first degree relatives    SH: [ ] tobacco, [ ] alcohol, [ ] drugs    aMIOdarone    Tablet 200 milliGRAM(s) Oral daily  ascorbic acid 500 milliGRAM(s) Oral daily  aspirin  chewable 81 milliGRAM(s) Oral daily  atorvastatin 20 milliGRAM(s) Oral at bedtime  cholecalciferol 2000 Unit(s) Oral daily  famotidine    Tablet 20 milliGRAM(s) Oral daily  heparin   Injectable 69087 Unit(s) IV Push every 6 hours PRN  heparin   Injectable 5000 Unit(s) IV Push every 6 hours PRN  heparin  Infusion.  Unit(s)/Hr IV Continuous <Continuous>  losartan 25 milliGRAM(s) Oral daily  metoprolol tartrate 50 milliGRAM(s) Oral two times a day  multivitamin 1 Tablet(s) Oral daily  ondansetron Injectable 4 milliGRAM(s) IV Push every 6 hours PRN                            13.0   14.82 )-----------( 197      ( 08 Apr 2021 07:31 )             40.0       04-08    136  |  101  |  29<H>  ----------------------------<  96  4.2   |  25  |  1.30    Ca    8.3<L>      08 Apr 2021 07:31  Phos  3.3     04-08  Mg     2.2     04-08    TPro  5.9<L>  /  Alb  3.1<L>  /  TBili  0.7  /  DBili  x   /  AST  12  /  ALT  31  /  AlkPhos  71  04-08      T(C): 36.7 (04-08-21 @ 09:55), Max: 37.1 (04-07-21 @ 21:00)  HR: 58 (04-08-21 @ 09:55) (54 - 67)  BP: 124/67 (04-08-21 @ 09:55) (107/58 - 142/63)  RR: 18 (04-08-21 @ 09:55) (18 - 18)  SpO2: 99% (04-08-21 @ 09:55) (98% - 99%)    General: Well nourished in no acute distress. Alert and Oriented * 3.   Head: Normocephalic and atraumatic.   Neck: No JVD. No bruits. Supple. Does not appear to be enlarged.   Cardiovascular: + S1,S2 ; RRR Soft systolic murmur at the left lower sternal border. No rubs noted.    Lungs: CTA b/l. No rhonchi, rales or wheezes.   Abdomen: + BS, soft. Non tender. Non distended. No rebound. No guarding.   Extremities: No clubbing/cyanosis/edema.   Neurologic: Moves all four extremities. Full range of motion.   Skin: Warm and moist. The patient's skin has normal elasticity and good skin turgor.   Psychiatric: Appropriate mood and affect.  Musculoskeletal: Normal range of motion, normal strength    DATA:    tele- AF 60s     < from: NM Nuclear Stress Pharmacologic Multiple (04.01.21 @ 14:37) >  IMPRESSION:  Abnormal  SPECT Myocardial Perfusion Imaging post rest and post vasodilator suspicious for mild anterolateral ischemia.         Normal left ventricular wall motion with ejection fraction of 52 % (normal: 50% or greater).       No regional wall motion abnormalities.       TID present, may represent subendocardial ischemia      Please refer to cardiac stress test report.    Comparison: No prior study available.    < end of copied text >    < from: TTE Echo Complete w/o Contrast w/ Doppler (03.26.21 @ 13:55) >  Summary:   1. Left ventricular ejection fraction, by visual estimation, is 50 to 55%.   2. Mildly decreased global left ventricular systolic function.   3. Mildly increased LV wall thickness.   4. Normal left ventricular internal cavity size.   5. There is mild concentric left ventricular hypertrophy.   6. Mildly enlarged left atrium.   7. Thickening of the anterior and posterior mitral valve leaflets.   8. Trace mitral valve regurgitation.   9. Mild-moderate tricuspid regurgitation.  10. Estimated pulmonary artery systolic pressure is 51.6 mmHg assuming a right atrial pressure of 10 mmHg, which is consistent with moderate pulmonary hypertension.    < end of copied text >    < from: TTE with Doppler (w/Cont) (04.08.21 @ 12:34) >  CONCLUSIONS:  Technically difficult study.  1. Mitral annular calcification, otherwise normal mitral  valve. Minimal mitral regurgitation.  2. Aortic valve leaflet morphology not well visualized.  Mild aortic regurgitation.  3. Endocardium not well visualized; grossly normal left  ventricular systolic function.  Endocardial visualization  enhanced with intravenous injection of echo contrast  (Definity).  4. Unable to accurately evaluate right ventricular size or  systolic function.  A device wire is noted in the right  heart.  ------------------------------------------------------------------------  Confirmed on  4/8/2021 - 13:48:17 by Jhonatan Chavez M.D.,  [facility]    < end of copied text >      < from: CT Angio Chest w/ IV Cont (04.08.21 @ 15:37) >  IMPRESSION:    No pulmonary embolism.      Diffuse bilateral peripheral opacities suggestive of COVID19.    < end of copied text >      ASSESSMENT/PLAN: 	70 year old male PMH PPM 2/2 bradycardia, otherwise no prior hx, recently admitted to [facility] on march 24th with COVID PNA, was found in NEW AF and started on Xarelto.  During that hospitalization he was noted with 18 beats NSVT and underwent a NST on 4/1 which revealed mild anterolateral ischemia and TID.  He was not a candidate for Trx for [facility] given COVID+ status so he started on medical management.  He was discharged home on 4/2 and presented today to the ER with SOB and dizziness since last night.     --Pt with indeterminate Trop T but no acute ischemic EKG changes  --monitor 02 sats  --ID re eval given CXR findings and prior covid PNA  --Cardio chart note from 4/2* noted  --repeat TTE noted above  --Held Xarelto and start Heparin Gtt in anticipation of further cardiac work up pending above  --eventual LHC when optimized

## 2021-04-08 NOTE — PROGRESS NOTE ADULT - ASSESSMENT
· Assessment	  70 M with h/o bradycardia s/p ppm, recent admission for hypoxia secondary to COVID-19 complicated by Afib/V tach presenting with shortness of breath and lightheadedness.    Problem/Plan - 1:  ·  Problem: Lightheadedness.  Plan: - Etiology unclear. Pt had recent abnormal stress. Also possible this may be arrhythmia related  - Trops flat  - Monitor on tele   - management as per cards    Problem/Plan - 2:  ·  Problem: Abnormal stress test.  Plan: - Pt declined cardiac cath last admission but amenable now   - Monitor on tele   - Cardiology consult appreciated    Problem/Plan - 3:  ·  Problem: COVID-19.  Plan: - Completed course Decadron and Remdesivir  - Symptoms improved. No longer hypoxic  - Will continue to monitor. Supportive care.     Problem/Plan - 4:  ·  Problem: Atrial fibrillation, unspecified type.  Plan: - Continue Metoprolol and Xarelto.     Problem/Plan - 5:  ·  Problem: SVT (supraventricular tachycardia).  Plan: - Continue Amiodarone.

## 2021-04-08 NOTE — PROGRESS NOTE ADULT - SUBJECTIVE AND OBJECTIVE BOX
Patient is a 70y old  Male who presents with a chief complaint of shortness of breath (08 Apr 2021 16:40)    Date of servie : 04-08-21 @ 20:18  INTERVAL HPI/OVERNIGHT EVENTS:  T(C): 36.7 (04-08-21 @ 09:55), Max: 37.1 (04-07-21 @ 21:00)  HR: 58 (04-08-21 @ 09:55) (54 - 58)  BP: 124/67 (04-08-21 @ 09:55) (107/58 - 132/65)  RR: 18 (04-08-21 @ 09:55) (18 - 18)  SpO2: 99% (04-08-21 @ 09:55) (98% - 99%)  Wt(kg): --  I&O's Summary      LABS:                        13.0   14.82 )-----------( 197      ( 08 Apr 2021 07:31 )             40.0     04-08    136  |  101  |  29<H>  ----------------------------<  96  4.2   |  25  |  1.30    Ca    8.3<L>      08 Apr 2021 07:31  Phos  3.3     04-08  Mg     2.2     04-08    TPro  5.9<L>  /  Alb  3.1<L>  /  TBili  0.7  /  DBili  x   /  AST  12  /  ALT  31  /  AlkPhos  71  04-08    PT/INR - ( 07 Apr 2021 14:22 )   PT: 24.5 sec;   INR: 2.23 ratio         PTT - ( 08 Apr 2021 18:23 )  PTT:>200.0 sec    CAPILLARY BLOOD GLUCOSE                MEDICATIONS  (STANDING):  aMIOdarone    Tablet 200 milliGRAM(s) Oral daily  ascorbic acid 500 milliGRAM(s) Oral daily  aspirin  chewable 81 milliGRAM(s) Oral daily  atorvastatin 20 milliGRAM(s) Oral at bedtime  cholecalciferol 2000 Unit(s) Oral daily  famotidine    Tablet 20 milliGRAM(s) Oral daily  heparin  Infusion.  Unit(s)/Hr (24 mL/Hr) IV Continuous <Continuous>  losartan 25 milliGRAM(s) Oral daily  metoprolol tartrate 50 milliGRAM(s) Oral two times a day  multivitamin 1 Tablet(s) Oral daily    MEDICATIONS  (PRN):  heparin   Injectable 89435 Unit(s) IV Push every 6 hours PRN For aPTT less than 40  heparin   Injectable 5000 Unit(s) IV Push every 6 hours PRN For aPTT between 40 - 57  ondansetron Injectable 4 milliGRAM(s) IV Push every 6 hours PRN Nausea          PHYSICAL EXAM:  GENERAL: NAD, well-groomed, well-developed  HEAD:  Atraumatic, Normocephalic  CHEST/LUNG: Clear to percussion bilaterally; No rales, rhonchi, wheezing, or rubs  HEART: Regular rate and rhythm; No murmurs, rubs, or gallops  ABDOMEN: Soft, Nontender, Nondistended; Bowel sounds present  EXTREMITIES:  2+ Peripheral Pulses, No clubbing, cyanosis, or edema  LYMPH: No lymphadenopathy noted  SKIN: No rashes or lesions    Care Discussed with Consultants/Other Providers [ ] YES  [ ] NO

## 2021-04-09 LAB
ALBUMIN SERPL ELPH-MCNC: 2.7 G/DL — LOW (ref 3.3–5)
ALP SERPL-CCNC: 73 U/L — SIGNIFICANT CHANGE UP (ref 40–120)
ALT FLD-CCNC: 28 U/L — SIGNIFICANT CHANGE UP (ref 4–41)
ANION GAP SERPL CALC-SCNC: 9 MMOL/L — SIGNIFICANT CHANGE UP (ref 7–14)
APTT BLD: 171.2 SEC — CRITICAL HIGH (ref 27–36.3)
APTT BLD: 63.1 SEC — HIGH (ref 27–36.3)
APTT BLD: 81.3 SEC — HIGH (ref 27–36.3)
AST SERPL-CCNC: 12 U/L — SIGNIFICANT CHANGE UP (ref 4–40)
BASOPHILS # BLD AUTO: 0.02 K/UL — SIGNIFICANT CHANGE UP (ref 0–0.2)
BASOPHILS NFR BLD AUTO: 0.2 % — SIGNIFICANT CHANGE UP (ref 0–2)
BILIRUB SERPL-MCNC: 0.4 MG/DL — SIGNIFICANT CHANGE UP (ref 0.2–1.2)
BUN SERPL-MCNC: 26 MG/DL — HIGH (ref 7–23)
CALCIUM SERPL-MCNC: 8.4 MG/DL — SIGNIFICANT CHANGE UP (ref 8.4–10.5)
CHLORIDE SERPL-SCNC: 102 MMOL/L — SIGNIFICANT CHANGE UP (ref 98–107)
CO2 SERPL-SCNC: 24 MMOL/L — SIGNIFICANT CHANGE UP (ref 22–31)
CREAT SERPL-MCNC: 1.29 MG/DL — SIGNIFICANT CHANGE UP (ref 0.5–1.3)
EOSINOPHIL # BLD AUTO: 0.15 K/UL — SIGNIFICANT CHANGE UP (ref 0–0.5)
EOSINOPHIL NFR BLD AUTO: 1.2 % — SIGNIFICANT CHANGE UP (ref 0–6)
GLUCOSE SERPL-MCNC: 100 MG/DL — HIGH (ref 70–99)
HCT VFR BLD CALC: 39.8 % — SIGNIFICANT CHANGE UP (ref 39–50)
HGB BLD-MCNC: 12.9 G/DL — LOW (ref 13–17)
IANC: 8.81 K/UL — HIGH (ref 1.5–8.5)
IMM GRANULOCYTES NFR BLD AUTO: 0.5 % — SIGNIFICANT CHANGE UP (ref 0–1.5)
LYMPHOCYTES # BLD AUTO: 1.8 K/UL — SIGNIFICANT CHANGE UP (ref 1–3.3)
LYMPHOCYTES # BLD AUTO: 14.1 % — SIGNIFICANT CHANGE UP (ref 13–44)
MAGNESIUM SERPL-MCNC: 2.3 MG/DL — SIGNIFICANT CHANGE UP (ref 1.6–2.6)
MCHC RBC-ENTMCNC: 29.3 PG — SIGNIFICANT CHANGE UP (ref 27–34)
MCHC RBC-ENTMCNC: 32.4 GM/DL — SIGNIFICANT CHANGE UP (ref 32–36)
MCV RBC AUTO: 90.2 FL — SIGNIFICANT CHANGE UP (ref 80–100)
MONOCYTES # BLD AUTO: 1.96 K/UL — HIGH (ref 0–0.9)
MONOCYTES NFR BLD AUTO: 15.3 % — HIGH (ref 2–14)
NEUTROPHILS # BLD AUTO: 8.81 K/UL — HIGH (ref 1.8–7.4)
NEUTROPHILS NFR BLD AUTO: 68.7 % — SIGNIFICANT CHANGE UP (ref 43–77)
NRBC # BLD: 0 /100 WBCS — SIGNIFICANT CHANGE UP
NRBC # FLD: 0 K/UL — SIGNIFICANT CHANGE UP
PHOSPHATE SERPL-MCNC: 3.3 MG/DL — SIGNIFICANT CHANGE UP (ref 2.5–4.5)
PLATELET # BLD AUTO: 182 K/UL — SIGNIFICANT CHANGE UP (ref 150–400)
POTASSIUM SERPL-MCNC: 4.2 MMOL/L — SIGNIFICANT CHANGE UP (ref 3.5–5.3)
POTASSIUM SERPL-SCNC: 4.2 MMOL/L — SIGNIFICANT CHANGE UP (ref 3.5–5.3)
PROT SERPL-MCNC: 6 G/DL — SIGNIFICANT CHANGE UP (ref 6–8.3)
RBC # BLD: 4.41 M/UL — SIGNIFICANT CHANGE UP (ref 4.2–5.8)
RBC # FLD: 14 % — SIGNIFICANT CHANGE UP (ref 10.3–14.5)
SODIUM SERPL-SCNC: 135 MMOL/L — SIGNIFICANT CHANGE UP (ref 135–145)
WBC # BLD: 12.81 K/UL — HIGH (ref 3.8–10.5)
WBC # FLD AUTO: 12.81 K/UL — HIGH (ref 3.8–10.5)

## 2021-04-09 PROCEDURE — 99222 1ST HOSP IP/OBS MODERATE 55: CPT | Mod: CS

## 2021-04-09 RX ADMIN — FAMOTIDINE 20 MILLIGRAM(S): 10 INJECTION INTRAVENOUS at 12:06

## 2021-04-09 RX ADMIN — HEPARIN SODIUM 1200 UNIT(S)/HR: 5000 INJECTION INTRAVENOUS; SUBCUTANEOUS at 11:37

## 2021-04-09 RX ADMIN — Medication 50 MILLIGRAM(S): at 05:07

## 2021-04-09 RX ADMIN — Medication 50 MILLIGRAM(S): at 17:45

## 2021-04-09 RX ADMIN — ATORVASTATIN CALCIUM 20 MILLIGRAM(S): 80 TABLET, FILM COATED ORAL at 21:16

## 2021-04-09 RX ADMIN — HEPARIN SODIUM 0 UNIT(S)/HR: 5000 INJECTION INTRAVENOUS; SUBCUTANEOUS at 04:07

## 2021-04-09 RX ADMIN — Medication 2000 UNIT(S): at 12:07

## 2021-04-09 RX ADMIN — Medication 81 MILLIGRAM(S): at 12:06

## 2021-04-09 RX ADMIN — Medication 1 TABLET(S): at 12:06

## 2021-04-09 RX ADMIN — HEPARIN SODIUM 1200 UNIT(S)/HR: 5000 INJECTION INTRAVENOUS; SUBCUTANEOUS at 05:07

## 2021-04-09 RX ADMIN — HEPARIN SODIUM 1200 UNIT(S)/HR: 5000 INJECTION INTRAVENOUS; SUBCUTANEOUS at 18:47

## 2021-04-09 RX ADMIN — LOSARTAN POTASSIUM 25 MILLIGRAM(S): 100 TABLET, FILM COATED ORAL at 05:07

## 2021-04-09 RX ADMIN — Medication 500 MILLIGRAM(S): at 12:08

## 2021-04-09 RX ADMIN — AMIODARONE HYDROCHLORIDE 200 MILLIGRAM(S): 400 TABLET ORAL at 05:07

## 2021-04-09 NOTE — CONSULT NOTE ADULT - SUBJECTIVE AND OBJECTIVE BOX
The patient is a 70 year old male who presents with a chief complaint of shortness of breath (08 Apr 2021 20:18)    HPI:  The patient is a 70 year old male with past medical history of bradycardia s/p PPM and recent admission for hypoxia secondary to COVID-19 complicated by atrial fibrillation and SVT who presented with shortness of breath and lightheadedness. He was completed therapy for COVID-19 with intravenous dexamethasone and intravenous remdesivir and symptoms improved besides some residual shortness of breath. He was recently admitted on 3/24 to 4/2 for COVID-19 pneumonia. On the night prior to admission, he felt lightheaded and called his cardiologist who advised him to come to the emergency department. He denies any fever, chills, chest pain, palpitations, cough, abdominal pain, nausea, vomiting, diarrhea, or constipation.     CBC showed neutrophilic leukocytosis with normocytic anemia. CMP showed elevated BUN with hyperglycemia.   Troponin T and BNP were normal. Hypoproteinemia and hypoalbuminemia were noted. INR was 2.23. COVID-19 PCR was still positive on 4/7/21 after initially testing positive on 3/24/21. COVID-19 Beto Ab was also positive. Blood cultures are unavailable at this time. CXR showed pulmonary vascular congestion. CT angiogram of the chest showed diffuse bilateral peripheral opacities suggestive of COVID19. Infectious disease was consulted for worsening COVID-19 pneumonia.        prior hospital charts reviewed [x]  primary team notes reviewed [x]  other consultant notes reviewed [x]    PAST MEDICAL & SURGICAL HISTORY:  Pacemaker  Bradycardia          Allergies  No Known Allergies    ANTIMICROBIALS (past 90 days)  MEDICATIONS  (STANDING):          OTHER MEDS: MEDICATIONS  (STANDING):  aMIOdarone    Tablet 200 daily  aspirin  chewable 81 daily  atorvastatin 20 at bedtime  famotidine    Tablet 20 daily  heparin   Injectable 59831 every 6 hours PRN  heparin   Injectable 5000 every 6 hours PRN  heparin  Infusion.  <Continuous>  losartan 25 daily  metoprolol tartrate 50 two times a day  ondansetron Injectable 4 every 6 hours PRN    SOCIAL HISTORY:   Unknown    FAMILY HISTORY:  FH: HTN (hypertension)    FH: type 2 diabetes      REVIEW OF SYSTEMS  [  ] ROS unobtainable because:    [x] All other systems negative except as noted below:	    Constitutional:  [ ] fever [ ] chills  [ ] weight loss  [ ] weakness  Skin:  [ ] rash [ ] phlebitis	  Eyes: [ ] icterus [ ] pain  [ ] discharge	  ENMT: [ ] sore throat  [ ] thrush [ ] ulcers [ ] exudates  Respiratory: [x] dyspnea [ ] hemoptysis [ ] cough [ ] sputum	  Cardiovascular:  [ ] chest pain [ ] palpitations [ ] edema	  Gastrointestinal:  [ ] nausea [ ] vomiting [ ] diarrhea [ ] constipation [ ] pain	  Genitourinary:  [ ] dysuria [ ] frequency [ ] hematuria [ ] discharge [ ] flank pain  [ ] incontinence  Musculoskeletal:  [ ] myalgias [ ] arthralgias [ ] arthritis  [ ] back pain  Neurological:  [ ] headache [ ] seizures  [ ] confusion/altered mental status  Psychiatric:  [ ] anxiety [ ] depression	  Hematology/Lymphatics:  [ ] lymphadenopathy  Endocrine:  [ ] adrenal [ ] thyroid  Allergic/Immunologic:	 [ ] transplant [ ] seasonal    Vital Signs Last 24 Hrs  T(F): 97.4 (04-09-21 @ 09:43), Max: 98.7 (04-07-21 @ 21:00)  Vital Signs Last 24 Hrs  HR: 55 (04-09-21 @ 09:43) (54 - 58)  BP: 114/69 (04-09-21 @ 09:43) (106/85 - 124/67)  RR: 18 (04-09-21 @ 09:43)  SpO2: 96% (04-09-21 @ 09:43) (96% - 99%)  Wt(kg): --    PHYSICAL EXAM:  Constitutional: non-toxic, no distress  HEAD/EYES: anicteric, no conjunctival injection  ENT:  supple, no thrush  Cardiovascular:   normal S1, S2, no murmur, no edema  Respiratory:  + decreased breath sounds bilaterally  GI:  soft, non-tender, normal bowel sounds  :  no calles, no CVA tenderness  Musculoskeletal:  no synovitis, normal ROM  Neurologic: awake and alert, normal strength, no focal findings  Skin:  no rash, no erythema, no phlebitis  Heme/Onc: no lymphadenopathy   Psychiatric:  awake, alert, appropriate mood                            12.9   12.81 )-----------( 182      ( 09 Apr 2021 03:21 )             39.8   04-09    135  |  102  |  26<H>  ----------------------------<  100<H>  4.2   |  24  |  1.29    Ca    8.4      09 Apr 2021 03:21  Phos  3.3     04-09  Mg     2.3     04-09    TPro  6.0  /  Alb  2.7<L>  /  TBili  0.4  /  DBili  x   /  AST  12  /  ALT  28  /  AlkPhos  73  04-09    MICROBIOLOGY:    COVID-19 PCR positive     COVID-19 Beto Ab positive           RADIOLOGY:    < from: CT Angio Chest w/ IV Cont (04.08.21 @ 15:37) >  FINDINGS:    Tubes/Lines: Left-sided cardiac pacemaker tip in the right ventricle.    Mediastinum/Vessels/Heart: No lymphadenopathy. Heart size is mildly enlarged. No pericardial effusion. No definitive pulmonary embolism. Apparent filling defect within the segmental branch of the left upper lobe, likely secondary to poor contrast opacification mixing artifact.    Lungs/Pleura/Airways: No endobronchial lesion. Diffuse bilateral patchy opacities.    Visualized abdomen: Unremarkable.    Bones and soft tissues: Degenerative changes of the spine.    IMPRESSION:    No pulmonary embolism.      Diffuse bilateral peripheral opacities suggestive of COVID19.    < end of copied text >    < from: Xray Chest 1 View- PORTABLE-Urgent (Xray Chest 1 View- PORTABLE-Urgent .) (04.07.21 @ 13:49) >  FINDINGS:    Apacemaker overlies the left hemithorax.  Cardiac silhouette is within normal limits.  Pulmonary vascular congestion.  No pleural effusions or pneumothorax.  No acute osseous abnormality.      IMPRESSION:    Pulmonary vascular congestion.    < end of copied text >   The patient is a 70 year old male who presents with a chief complaint of shortness of breath (08 Apr 2021 20:18)    HPI:  The patient is a 70 year old male with past medical history of bradycardia s/p PPM and recent admission for hypoxia secondary to COVID-19 complicated by atrial fibrillation and SVT who presented with shortness of breath and lightheadedness. He was completed therapy for COVID-19 with intravenous dexamethasone and intravenous remdesivir and symptoms improved besides some residual shortness of breath. He was recently admitted to on 3/24 to 4/2 for COVID-19 pneumonia. He was admitted to the MICU during that time for COVID-19 and NSVT. On the night prior to admission, he felt lightheaded and called his cardiologist who advised him to come to the emergency department. He denies any fever, chills, chest pain, palpitations, cough, abdominal pain, nausea, vomiting, diarrhea, or constipation.     CBC showed neutrophilic leukocytosis with normocytic anemia. CMP showed elevated BUN with hyperglycemia.   Troponin T and BNP were normal. Hypoproteinemia and hypoalbuminemia were noted. INR was 2.23. COVID-19 PCR was still positive on 4/7/21 after initially testing positive on 3/24/21. COVID-19 Beto Ab was also positive. Blood cultures are unavailable at this time. CXR showed pulmonary vascular congestion. CT angiogram of the chest showed diffuse bilateral peripheral opacities suggestive of COVID19. Infectious disease was consulted for worsening COVID-19 pneumonia.        prior hospital charts reviewed [x]  primary team notes reviewed [x]  other consultant notes reviewed [x]    PAST MEDICAL & SURGICAL HISTORY:  Pacemaker  Bradycardia          Allergies  No Known Allergies    ANTIMICROBIALS (past 90 days)  MEDICATIONS  (STANDING):          OTHER MEDS: MEDICATIONS  (STANDING):  aMIOdarone    Tablet 200 daily  aspirin  chewable 81 daily  atorvastatin 20 at bedtime  famotidine    Tablet 20 daily  heparin   Injectable 40194 every 6 hours PRN  heparin   Injectable 5000 every 6 hours PRN  heparin  Infusion.  <Continuous>  losartan 25 daily  metoprolol tartrate 50 two times a day  ondansetron Injectable 4 every 6 hours PRN    SOCIAL HISTORY:   Unknown    FAMILY HISTORY:  FH: HTN (hypertension)    FH: type 2 diabetes      REVIEW OF SYSTEMS  [  ] ROS unobtainable because:    [x] All other systems negative except as noted below:	    Constitutional:  [ ] fever [ ] chills  [ ] weight loss  [ ] weakness  Skin:  [ ] rash [ ] phlebitis	  Eyes: [ ] icterus [ ] pain  [ ] discharge	  ENMT: [ ] sore throat  [ ] thrush [ ] ulcers [ ] exudates  Respiratory: [x] dyspnea [ ] hemoptysis [ ] cough [ ] sputum	  Cardiovascular:  [ ] chest pain [ ] palpitations [ ] edema	  Gastrointestinal:  [ ] nausea [ ] vomiting [ ] diarrhea [ ] constipation [ ] pain	  Genitourinary:  [ ] dysuria [ ] frequency [ ] hematuria [ ] discharge [ ] flank pain  [ ] incontinence  Musculoskeletal:  [ ] myalgias [ ] arthralgias [ ] arthritis  [ ] back pain  Neurological:  [ ] headache [ ] seizures  [ ] confusion/altered mental status  Psychiatric:  [ ] anxiety [ ] depression	  Hematology/Lymphatics:  [ ] lymphadenopathy  Endocrine:  [ ] adrenal [ ] thyroid  Allergic/Immunologic:	 [ ] transplant [ ] seasonal    Vital Signs Last 24 Hrs  T(F): 97.4 (04-09-21 @ 09:43), Max: 98.7 (04-07-21 @ 21:00)  Vital Signs Last 24 Hrs  HR: 55 (04-09-21 @ 09:43) (54 - 58)  BP: 114/69 (04-09-21 @ 09:43) (106/85 - 124/67)  RR: 18 (04-09-21 @ 09:43)  SpO2: 96% (04-09-21 @ 09:43) (96% - 99%)  Wt(kg): --    PHYSICAL EXAM:  Constitutional: non-toxic, no distress  HEAD/EYES: anicteric, no conjunctival injection  ENT:  supple, no thrush  Cardiovascular:   normal S1, S2, no murmur, no edema  Respiratory:  + decreased breath sounds bilaterally  GI:  soft, non-tender, normal bowel sounds  :  no calles, no CVA tenderness  Musculoskeletal:  no synovitis, normal ROM  Neurologic: awake and alert, normal strength, no focal findings  Skin:  no rash, no erythema, no phlebitis  Heme/Onc: no lymphadenopathy   Psychiatric:  awake, alert, appropriate mood                            12.9   12.81 )-----------( 182      ( 09 Apr 2021 03:21 )             39.8   04-09    135  |  102  |  26<H>  ----------------------------<  100<H>  4.2   |  24  |  1.29    Ca    8.4      09 Apr 2021 03:21  Phos  3.3     04-09  Mg     2.3     04-09    TPro  6.0  /  Alb  2.7<L>  /  TBili  0.4  /  DBili  x   /  AST  12  /  ALT  28  /  AlkPhos  73  04-09    MICROBIOLOGY:    COVID-19 PCR positive     COVID-19 Beto Ab positive           RADIOLOGY:    < from: CT Angio Chest w/ IV Cont (04.08.21 @ 15:37) >  FINDINGS:    Tubes/Lines: Left-sided cardiac pacemaker tip in the right ventricle.    Mediastinum/Vessels/Heart: No lymphadenopathy. Heart size is mildly enlarged. No pericardial effusion. No definitive pulmonary embolism. Apparent filling defect within the segmental branch of the left upper lobe, likely secondary to poor contrast opacification mixing artifact.    Lungs/Pleura/Airways: No endobronchial lesion. Diffuse bilateral patchy opacities.    Visualized abdomen: Unremarkable.    Bones and soft tissues: Degenerative changes of the spine.    IMPRESSION:    No pulmonary embolism.      Diffuse bilateral peripheral opacities suggestive of COVID19.    < end of copied text >    < from: Xray Chest 1 View- PORTABLE-Urgent (Xray Chest 1 View- PORTABLE-Urgent .) (04.07.21 @ 13:49) >  FINDINGS:    Apacemaker overlies the left hemithorax.  Cardiac silhouette is within normal limits.  Pulmonary vascular congestion.  No pleural effusions or pneumothorax.  No acute osseous abnormality.      IMPRESSION:    Pulmonary vascular congestion.    < end of copied text >   The patient is a 70 year old male who presents with a chief complaint of shortness of breath (08 Apr 2021 20:18)    HPI:  The patient is a 70 year old male with past medical history of bradycardia s/p PPM and recent admission for hypoxia secondary to COVID-19 complicated by atrial fibrillation and SVT who presented with shortness of breath and lightheadedness. He was completed therapy for COVID-19 with intravenous dexamethasone and intravenous remdesivir and symptoms improved besides some residual shortness of breath. He was recently admitted to on 3/24 to 4/2 at [facility] for COVID-19 pneumonia. He was admitted to the MICU during that time for COVID-19 and NSVT. On the night prior to admission, he felt lightheaded and called his cardiologist who advised him to come to the emergency department. He denies any fever, chills, chest pain, palpitations, cough, abdominal pain, nausea, vomiting, diarrhea, or constipation.     CBC showed neutrophilic leukocytosis with normocytic anemia. CMP showed elevated BUN with hyperglycemia.   Troponin T and BNP were normal. Hypoproteinemia and hypoalbuminemia were noted. INR was 2.23. COVID-19 PCR was still positive on 4/7/21 after initially testing positive on 3/24/21. COVID-19 Beto Ab was also positive. Blood cultures are unavailable at this time. CXR showed pulmonary vascular congestion. CT angiogram of the chest showed diffuse bilateral peripheral opacities suggestive of COVID19. Infectious disease was consulted for worsening COVID-19 pneumonia.        prior hospital charts reviewed [x]  primary team notes reviewed [x]  other consultant notes reviewed [x]    PAST MEDICAL & SURGICAL HISTORY:  Pacemaker  Bradycardia          Allergies  No Known Allergies    ANTIMICROBIALS (past 90 days)  MEDICATIONS  (STANDING):          OTHER MEDS: MEDICATIONS  (STANDING):  aMIOdarone    Tablet 200 daily  aspirin  chewable 81 daily  atorvastatin 20 at bedtime  famotidine    Tablet 20 daily  heparin   Injectable 36248 every 6 hours PRN  heparin   Injectable 5000 every 6 hours PRN  heparin  Infusion.  <Continuous>  losartan 25 daily  metoprolol tartrate 50 two times a day  ondansetron Injectable 4 every 6 hours PRN    SOCIAL HISTORY:   Unknown    FAMILY HISTORY:  FH: HTN (hypertension)    FH: type 2 diabetes      REVIEW OF SYSTEMS  [  ] ROS unobtainable because:    [x] All other systems negative except as noted below:	    Constitutional:  [ ] fever [ ] chills  [ ] weight loss  [ ] weakness  Skin:  [ ] rash [ ] phlebitis	  Eyes: [ ] icterus [ ] pain  [ ] discharge	  ENMT: [ ] sore throat  [ ] thrush [ ] ulcers [ ] exudates  Respiratory: [x] dyspnea [ ] hemoptysis [ ] cough [ ] sputum	  Cardiovascular:  [ ] chest pain [ ] palpitations [ ] edema	  Gastrointestinal:  [ ] nausea [ ] vomiting [ ] diarrhea [ ] constipation [ ] pain	  Genitourinary:  [ ] dysuria [ ] frequency [ ] hematuria [ ] discharge [ ] flank pain  [ ] incontinence  Musculoskeletal:  [ ] myalgias [ ] arthralgias [ ] arthritis  [ ] back pain  Neurological:  [ ] headache [ ] seizures  [ ] confusion/altered mental status  Psychiatric:  [ ] anxiety [ ] depression	  Hematology/Lymphatics:  [ ] lymphadenopathy  Endocrine:  [ ] adrenal [ ] thyroid  Allergic/Immunologic:	 [ ] transplant [ ] seasonal    Vital Signs Last 24 Hrs  T(F): 97.4 (04-09-21 @ 09:43), Max: 98.7 (04-07-21 @ 21:00)  Vital Signs Last 24 Hrs  HR: 55 (04-09-21 @ 09:43) (54 - 58)  BP: 114/69 (04-09-21 @ 09:43) (106/85 - 124/67)  RR: 18 (04-09-21 @ 09:43)  SpO2: 96% (04-09-21 @ 09:43) (96% - 99%)  Wt(kg): --    PHYSICAL EXAM:  Constitutional: non-toxic, no distress  HEAD/EYES: anicteric, no conjunctival injection  ENT:  supple, no thrush  Cardiovascular:   normal S1, S2, no murmur, no edema, + pacemaker in place without surrounding erythema  Respiratory:  + decreased breath sounds bilaterally  GI:  soft, non-tender, normal bowel sounds, + obese  :  no calles, no CVA tenderness  Musculoskeletal:  no synovitis, normal ROM  Neurologic: awake and alert, normal strength, no focal findings  Skin:  no rash, no erythema, no phlebitis  Heme/Onc: no lymphadenopathy   Psychiatric:  awake, alert, appropriate mood                            12.9   12.81 )-----------( 182      ( 09 Apr 2021 03:21 )             39.8   04-09    135  |  102  |  26<H>  ----------------------------<  100<H>  4.2   |  24  |  1.29    Ca    8.4      09 Apr 2021 03:21  Phos  3.3     04-09  Mg     2.3     04-09    TPro  6.0  /  Alb  2.7<L>  /  TBili  0.4  /  DBili  x   /  AST  12  /  ALT  28  /  AlkPhos  73  04-09    MICROBIOLOGY:    COVID-19 PCR positive     COVID-19 Beto Ab positive           RADIOLOGY:    < from: CT Angio Chest w/ IV Cont (04.08.21 @ 15:37) >  FINDINGS:    Tubes/Lines: Left-sided cardiac pacemaker tip in the right ventricle.    Mediastinum/Vessels/Heart: No lymphadenopathy. Heart size is mildly enlarged. No pericardial effusion. No definitive pulmonary embolism. Apparent filling defect within the segmental branch of the left upper lobe, likely secondary to poor contrast opacification mixing artifact.    Lungs/Pleura/Airways: No endobronchial lesion. Diffuse bilateral patchy opacities.    Visualized abdomen: Unremarkable.    Bones and soft tissues: Degenerative changes of the spine.    IMPRESSION:    No pulmonary embolism.      Diffuse bilateral peripheral opacities suggestive of COVID19.    < end of copied text >    < from: Xray Chest 1 View- PORTABLE-Urgent (Xray Chest 1 View- PORTABLE-Urgent .) (04.07.21 @ 13:49) >  FINDINGS:    Apacemaker overlies the left hemithorax.  Cardiac silhouette is within normal limits.  Pulmonary vascular congestion.  No pleural effusions or pneumothorax.  No acute osseous abnormality.      IMPRESSION:    Pulmonary vascular congestion.    < end of copied text >

## 2021-04-09 NOTE — CONSULT NOTE ADULT - ASSESSMENT
Assessment:  The patient is a 70 year old male with past medical history of bradycardia s/p PPM and recent admission for hypoxia secondary to COVID-19 complicated by atrial fibrillation and SVT who presented with shortness of breath and lightheadedness. He was completed therapy for COVID-19 with intravenous dexamethasone and intravenous remdesivir and symptoms improved besides some residual shortness of breath. He was recently admitted on 3/24 to 4/2 for COVID-19 pneumonia. On the night prior to admission, he felt lightheaded and called his cardiologist who advised him to come to the emergency department. His COVID-19 test returned positive again and infectious disease was consulted for worsening COVID-19 pneumonia.      Plan:  # COVID-19 disease  - Trend CRP, LDH, D-dimer, and Ferritin q48 hours  - Recommend proning for further oxygenation  - Send blood cultures and sputum cultures  - Trend CBC and CMP daily   - Monitor fever curve  - Maintain airborne and contact precautions  - ID will continue to follow    Case not yet discussed with attending.      Lars Monahan MD PGY-4   Fellow, Infectious Diseases   Pager: [phone]  If no response, after 5pm and on weekends: Call [phone]   Assessment:  The patient is a 70 year old male with past medical history of bradycardia s/p PPM and recent admission for hypoxia secondary to COVID-19 complicated by atrial fibrillation and SVT who presented with shortness of breath and lightheadedness. He was completed therapy for COVID-19 with intravenous dexamethasone and intravenous remdesivir and symptoms improved besides some residual shortness of breath. He was recently admitted on 3/24 to 4/2 for COVID-19 pneumonia. On the night prior to admission, he felt lightheaded and called his cardiologist who advised him to come to the emergency department. His COVID-19 test returned positive again and infectious disease was consulted for worsening COVID-19 pneumonia.      Plan:  # COVID-19 disease  - Trend CRP, LDH, D-dimer, and Ferritin q48 hours  - Recommend proning for further oxygenation  - Trend CBC and CMP daily   - Monitor fever curve  - Maintain airborne and contact precautions  - ID will continue to follow    Case not yet discussed with attending.      Lars Monahan MD PGY-4   Fellow, Infectious Diseases   Pager: [phone]  If no response, after 5pm and on weekends: Call [phone]   Assessment:  The patient is a 70 year old male with past medical history of bradycardia s/p PPM and recent admission for hypoxia secondary to COVID-19 complicated by atrial fibrillation and SVT who presented with shortness of breath and lightheadedness. He was completed therapy for COVID-19 with intravenous dexamethasone and intravenous remdesivir and symptoms improved besides some residual shortness of breath. He was recently admitted on 3/24 to 4/2 for COVID-19 pneumonia. On the night prior to admission, he felt lightheaded and called his cardiologist who advised him to come to the emergency department. His COVID-19 test returned positive again and infectious disease was consulted for worsening COVID-19 pneumonia.      Plan:  # COVID-19 disease  - Patient does not need treatment for COVID-19 at this time  - Trend CRP, LDH, D-dimer, and Ferritin q48 hours  - Patient to have rest of cardiac workup  - Trend CBC and CMP daily   - Monitor fever curve  - Maintain airborne and contact precautions  - ID will continue to follow        Lars Monahan MD PGY-4   Fellow, Infectious Diseases   Pager: [phone]  If no response, after 5pm and on weekends: Call [phone]   Assessment:  The patient is a 70 year old male with past medical history of bradycardia s/p PPM and recent admission for hypoxia secondary to COVID-19 complicated by atrial fibrillation and SVT who presented with shortness of breath and lightheadedness. He was completed therapy for COVID-19 with intravenous dexamethasone and intravenous remdesivir and symptoms improved besides some residual shortness of breath. He was recently admitted on 3/24 to 4/2 for COVID-19 pneumonia. On the night prior to admission, he felt lightheaded and called his cardiologist who advised him to come to the emergency department. His COVID-19 test returned positive again and infectious disease was consulted for worsening COVID-19 pneumonia.      Plan:  # COVID-19 disease  - Patient does not need treatment for COVID-19 at this time  - Patient to have rest of cardiac workup  - Monitor fever curve  - Discontinue airborne and contact precautions  - Stable from ID standpoint, patient may benefit discharge home  - ID to sign off        Lars Monahan MD PGY-4   Fellow, Infectious Diseases   Pager: [phone]  If no response, after 5pm and on weekends: Call [phone]   Assessment:  The patient is a 70 year old male with past medical history of bradycardia s/p PPM and recent admission for hypoxia secondary to COVID-19 complicated by atrial fibrillation and SVT who presented with shortness of breath and lightheadedness. He was completed therapy for COVID-19 with intravenous dexamethasone and intravenous remdesivir and symptoms improved besides some residual shortness of breath. He was recently admitted on 3/24 to 4/2 for COVID-19 pneumonia. On the night prior to admission, he felt lightheaded and called his cardiologist who advised him to come to the emergency department. His COVID-19 test returned positive again and infectious disease was consulted     Plan:  # COVID-19 disease  - no s/s respiratory disease   Unclear if dizziness from COVID   - Patient to have rest of cardiac workup  - Monitor fever curve          Lars Monahan MD PGY-4   Fellow, Infectious Diseases   Pager: [phone]  If no response, after 5pm and on weekends: Call [phone]

## 2021-04-09 NOTE — CONSULT NOTE ADULT - ATTENDING COMMENTS
Patient seen and examined.  Agree with above.   Admitted with dyspnea after recently being admitted with COVID  Pt. with abnormal NST on prior hospitalization - cath was deferred at that time due to COVID  Will transition NOAC to hep gtt in anticipation of cath  Cath when medically optimized  EP eval with Dr. Gonzalez for history of VT and management of AF     Ann Marie Gipson MD
Patient seen and examined  Above verified  Agree with above unless as noted below.  70 year old male with past medical history of bradycardia s/p PPM and recent admission for hypoxia secondary to COVID-19 complicated by atrial fibrillation and SVT who presented with shortness of breath and lightheadedness. He was completed therapy for COVID-19 with intravenous dexamethasone and intravenous remdesivir and symptoms improved besides some residual shortness of breath. He was recently admitted on 3/24 to 4/2 for COVID-19 pneumonia. On the night prior to admission, he felt lightheaded and called his cardiologist who advised him to come to the emergency department. His COVID-19 test returned positive again and infectious disease was consulted   Patient with no s/s respiratory disease  stable on room air  COVID PCR may reflect dead virus-can sometimes be positive for long after acute disease  No Rx indicated  Unclear and unlikely dizziness from recent covid  will defer to primary team on workup  Monitor for s/s any other infectious process  Will defer to primary team on management of other issues.  Assessment, plan and recommendations as detailed above were discussed with the medical/primary  team.  ID will follow as needed,please call  if any questions or issues.

## 2021-04-09 NOTE — PROGRESS NOTE ADULT - SUBJECTIVE AND OBJECTIVE BOX
Patient is a 70y old  Male who presents with a chief complaint of shortness of breath (09 Apr 2021 17:01)    Date of servie : 04-09-21 @ 17:29  INTERVAL HPI/OVERNIGHT EVENTS:  T(C): 36.3 (04-09-21 @ 09:43), Max: 36.8 (04-08-21 @ 23:59)  HR: 55 (04-09-21 @ 09:43) (54 - 55)  BP: 114/69 (04-09-21 @ 09:43) (106/85 - 117/60)  RR: 18 (04-09-21 @ 09:43) (18 - 18)  SpO2: 96% (04-09-21 @ 09:43) (96% - 99%)  Wt(kg): --  I&O's Summary      LABS:                        12.9   12.81 )-----------( 182      ( 09 Apr 2021 03:21 )             39.8     04-09    135  |  102  |  26<H>  ----------------------------<  100<H>  4.2   |  24  |  1.29    Ca    8.4      09 Apr 2021 03:21  Phos  3.3     04-09  Mg     2.3     04-09    TPro  6.0  /  Alb  2.7<L>  /  TBili  0.4  /  DBili  x   /  AST  12  /  ALT  28  /  AlkPhos  73  04-09    PTT - ( 09 Apr 2021 11:03 )  PTT:81.3 sec    CAPILLARY BLOOD GLUCOSE                MEDICATIONS  (STANDING):  aMIOdarone    Tablet 200 milliGRAM(s) Oral daily  ascorbic acid 500 milliGRAM(s) Oral daily  aspirin  chewable 81 milliGRAM(s) Oral daily  atorvastatin 20 milliGRAM(s) Oral at bedtime  cholecalciferol 2000 Unit(s) Oral daily  famotidine    Tablet 20 milliGRAM(s) Oral daily  heparin  Infusion.  Unit(s)/Hr (24 mL/Hr) IV Continuous <Continuous>  losartan 25 milliGRAM(s) Oral daily  metoprolol tartrate 50 milliGRAM(s) Oral two times a day  multivitamin 1 Tablet(s) Oral daily    MEDICATIONS  (PRN):  heparin   Injectable 99997 Unit(s) IV Push every 6 hours PRN For aPTT less than 40  heparin   Injectable 5000 Unit(s) IV Push every 6 hours PRN For aPTT between 40 - 57  ondansetron Injectable 4 milliGRAM(s) IV Push every 6 hours PRN Nausea          PHYSICAL EXAM:  GENERAL: NAD, well-groomed, well-developed  HEAD:  Atraumatic, Normocephalic  CHEST/LUNG: Clear to percussion bilaterally; No rales, rhonchi, wheezing, or rubs  HEART: Regular rate and rhythm; No murmurs, rubs, or gallops  ABDOMEN: Soft, Nontender, Nondistended; Bowel sounds present  EXTREMITIES:  2+ Peripheral Pulses, No clubbing, cyanosis, or edema  LYMPH: No lymphadenopathy noted  SKIN: No rashes or lesions    Care Discussed with Consultants/Other Providers [ ] YES  [ ] NO

## 2021-04-09 NOTE — PROGRESS NOTE ADULT - SUBJECTIVE AND OBJECTIVE BOX
chief complaint: SOB    extended hpi:  70 year old male PMH PPM 2/2 bradycardia, otherwise no prior hx, recently admitted to [facility] on march 24th with COVID PNA, was found in NEW AF and started on Xarelto.  During that hospitalization he was noted with 18 beats NSVT and underwent a NST on 4/1 which revealed mild anterolateral ischemia and TID.  He was not a candidate for Trx for LHC given COVID+ status so he started on medical management.  He was discharged home on 4/2 and presented today to the ER with SOB and dizziness since last night.     Today denies CP SOB or palps.  ROS otherwise -    Review of Systems:   Constitutional: [ ] fevers, [ ] chills.   Skin: [ ] dry skin. [ ] rashes.  Psychiatric: [ ] depression, [ ] anxiety.   Gastrointestinal: [ ] BRBPR, [ ] melena.   Neurological: [ ] confusion. [ ] seizures. [ ] shuffling gait.   Ears,Nose,Mouth and Throat: [ ] ear pain [ ] sore throat.   Eyes: [ ] diplopia.   Respiratory: [ ] hemoptysis. [ ] shortness of breath  Cardiovascular: See HPI above  Hematologic/Lymphatic: [ ] anemia. [ ] painful nodes. [ ] prolonged bleeding.   Genitourinary: [ ] hematuria. [ ] flank pain.   Endocrine: [ ] significant change in weight. [ ] intolerance to heat and cold.     Review of systems [x ] otherwise negative, [ ] otherwise unable to obtain    FH: no family history of sudden cardiac death in first degree relatives    SH: [ ] tobacco, [ ] alcohol, [ ] drugs    aMIOdarone    Tablet 200 milliGRAM(s) Oral daily  ascorbic acid 500 milliGRAM(s) Oral daily  aspirin  chewable 81 milliGRAM(s) Oral daily  atorvastatin 20 milliGRAM(s) Oral at bedtime  cholecalciferol 2000 Unit(s) Oral daily  famotidine    Tablet 20 milliGRAM(s) Oral daily  heparin   Injectable 43477 Unit(s) IV Push every 6 hours PRN  heparin   Injectable 5000 Unit(s) IV Push every 6 hours PRN  heparin  Infusion.  Unit(s)/Hr IV Continuous <Continuous>  losartan 25 milliGRAM(s) Oral daily  metoprolol tartrate 50 milliGRAM(s) Oral two times a day  multivitamin 1 Tablet(s) Oral daily  ondansetron Injectable 4 milliGRAM(s) IV Push every 6 hours PRN                            12.9   12.81 )-----------( 182      ( 09 Apr 2021 03:21 )             39.8       04-09    135  |  102  |  26<H>  ----------------------------<  100<H>  4.2   |  24  |  1.29    Ca    8.4      09 Apr 2021 03:21  Phos  3.3     04-09  Mg     2.3     04-09    TPro  6.0  /  Alb  2.7<L>  /  TBili  0.4  /  DBili  x   /  AST  12  /  ALT  28  /  AlkPhos  73  04-09            T(C): 36.3 (04-09-21 @ 09:43), Max: 36.8 (04-08-21 @ 23:59)  HR: 55 (04-09-21 @ 09:43) (54 - 55)  BP: 114/69 (04-09-21 @ 09:43) (106/85 - 117/60)  RR: 18 (04-09-21 @ 09:43) (18 - 18)  SpO2: 96% (04-09-21 @ 09:43) (96% - 99%)  Wt(kg): --    I&O's Summary      General: Well nourished in no acute distress. Alert and Oriented * 3.   Head: Normocephalic and atraumatic.   Neck: No JVD. No bruits. Supple. Does not appear to be enlarged.   Cardiovascular: + S1,S2 ; RRR Soft systolic murmur at the left lower sternal border. No rubs noted.    Lungs: CTA b/l. No rhonchi, rales or wheezes.   Abdomen: + BS, soft. Non tender. Non distended. No rebound. No guarding.   Extremities: No clubbing/cyanosis/edema.   Neurologic: Moves all four extremities. Full range of motion.   Skin: Warm and moist. The patient's skin has normal elasticity and good skin turgor.   Psychiatric: Appropriate mood and affect.  Musculoskeletal: Normal range of motion, normal strength    DATA:    tele- AF 60s     < from: NM Nuclear Stress Pharmacologic Multiple (04.01.21 @ 14:37) >  IMPRESSION:  Abnormal  SPECT Myocardial Perfusion Imaging post rest and post vasodilator suspicious for mild anterolateral ischemia.         Normal left ventricular wall motion with ejection fraction of 52 % (normal: 50% or greater).       No regional wall motion abnormalities.       TID present, may represent subendocardial ischemia      Please refer to cardiac stress test report.    Comparison: No prior study available.    < end of copied text >    < from: TTE Echo Complete w/o Contrast w/ Doppler (03.26.21 @ 13:55) >  Summary:   1. Left ventricular ejection fraction, by visual estimation, is 50 to 55%.   2. Mildly decreased global left ventricular systolic function.   3. Mildly increased LV wall thickness.   4. Normal left ventricular internal cavity size.   5. There is mild concentric left ventricular hypertrophy.   6. Mildly enlarged left atrium.   7. Thickening of the anterior and posterior mitral valve leaflets.   8. Trace mitral valve regurgitation.   9. Mild-moderate tricuspid regurgitation.  10. Estimated pulmonary artery systolic pressure is 51.6 mmHg assuming a right atrial pressure of 10 mmHg, which is consistent with moderate pulmonary hypertension.    < end of copied text >    < from: TTE with Doppler (w/Cont) (04.08.21 @ 12:34) >  CONCLUSIONS:  Technically difficult study.  1. Mitral annular calcification, otherwise normal mitral  valve. Minimal mitral regurgitation.  2. Aortic valve leaflet morphology not well visualized.  Mild aortic regurgitation.  3. Endocardium not well visualized; grossly normal left  ventricular systolic function.  Endocardial visualization  enhanced with intravenous injection of echo contrast  (Definity).  4. Unable to accurately evaluate right ventricular size or  systolic function.  A device wire is noted in the right  heart.  ------------------------------------------------------------------------  Confirmed on  4/8/2021 - 13:48:17 by Jhonatan Chavez M.D.,  [facility], [facility]    < end of copied text >      < from: CT Angio Chest w/ IV Cont (04.08.21 @ 15:37) >  IMPRESSION:    No pulmonary embolism.      Diffuse bilateral peripheral opacities suggestive of COVID19.    < end of copied text >      ASSESSMENT/PLAN: 	70 year old male PMH PPM 2/2 bradycardia, otherwise no prior hx, recently admitted to [facility] on march 24th with COVID PNA, was found in NEW AF and started on Xarelto.  During that hospitalization he was noted with 18 beats NSVT and underwent a NST on 4/1 which revealed mild anterolateral ischemia and TID.  He was not a candidate for Trx for LHC given COVID+ status so he started on medical management.  He was discharged home on 4/2 and presented today to the ER with SOB and dizziness since last night.     --Pt with indeterminate Trop T but no acute ischemic EKG changes  --monitor 02 sats  --ID re eval given CXR findings and prior covid PNA  --Cardio chart note from 4/2* noted  --repeat TTE noted above with normal LV function   --Held Xarelto and start Heparin Gtt in anticipation of further cardiac work up pending above  --ID eval appreciated  --Pt. remains COVID positive - will plan on cardiac cath when cath lab policy allows given COVID positivity     Ann Marie Gipson MD

## 2021-04-10 LAB
ANION GAP SERPL CALC-SCNC: 10 MMOL/L — SIGNIFICANT CHANGE UP (ref 7–14)
APTT BLD: 82.5 SEC — HIGH (ref 27–36.3)
BUN SERPL-MCNC: 28 MG/DL — HIGH (ref 7–23)
CALCIUM SERPL-MCNC: 8.4 MG/DL — SIGNIFICANT CHANGE UP (ref 8.4–10.5)
CHLORIDE SERPL-SCNC: 104 MMOL/L — SIGNIFICANT CHANGE UP (ref 98–107)
CO2 SERPL-SCNC: 23 MMOL/L — SIGNIFICANT CHANGE UP (ref 22–31)
CREAT SERPL-MCNC: 1.24 MG/DL — SIGNIFICANT CHANGE UP (ref 0.5–1.3)
CRP SERPL-MCNC: 72.6 MG/L — HIGH
D DIMER BLD IA.RAPID-MCNC: <150 NG/ML DDU — SIGNIFICANT CHANGE UP
FERRITIN SERPL-MCNC: 673 NG/ML — HIGH (ref 30–400)
GLUCOSE SERPL-MCNC: 84 MG/DL — SIGNIFICANT CHANGE UP (ref 70–99)
HCT VFR BLD CALC: 35.8 % — LOW (ref 39–50)
HGB BLD-MCNC: 11.4 G/DL — LOW (ref 13–17)
LDH SERPL L TO P-CCNC: 273 U/L — HIGH (ref 135–225)
MAGNESIUM SERPL-MCNC: 2.4 MG/DL — SIGNIFICANT CHANGE UP (ref 1.6–2.6)
MCHC RBC-ENTMCNC: 29 PG — SIGNIFICANT CHANGE UP (ref 27–34)
MCHC RBC-ENTMCNC: 31.8 GM/DL — LOW (ref 32–36)
MCV RBC AUTO: 91.1 FL — SIGNIFICANT CHANGE UP (ref 80–100)
NRBC # BLD: 0 /100 WBCS — SIGNIFICANT CHANGE UP
NRBC # FLD: 0 K/UL — SIGNIFICANT CHANGE UP
PLATELET # BLD AUTO: 154 K/UL — SIGNIFICANT CHANGE UP (ref 150–400)
POTASSIUM SERPL-MCNC: 4.5 MMOL/L — SIGNIFICANT CHANGE UP (ref 3.5–5.3)
POTASSIUM SERPL-SCNC: 4.5 MMOL/L — SIGNIFICANT CHANGE UP (ref 3.5–5.3)
PROCALCITONIN SERPL-MCNC: 0.08 NG/ML — SIGNIFICANT CHANGE UP (ref 0.02–0.1)
RBC # BLD: 3.93 M/UL — LOW (ref 4.2–5.8)
RBC # FLD: 14.2 % — SIGNIFICANT CHANGE UP (ref 10.3–14.5)
SODIUM SERPL-SCNC: 137 MMOL/L — SIGNIFICANT CHANGE UP (ref 135–145)
WBC # BLD: 10.36 K/UL — SIGNIFICANT CHANGE UP (ref 3.8–10.5)
WBC # FLD AUTO: 10.36 K/UL — SIGNIFICANT CHANGE UP (ref 3.8–10.5)

## 2021-04-10 RX ADMIN — AMIODARONE HYDROCHLORIDE 200 MILLIGRAM(S): 400 TABLET ORAL at 05:06

## 2021-04-10 RX ADMIN — ATORVASTATIN CALCIUM 20 MILLIGRAM(S): 80 TABLET, FILM COATED ORAL at 21:05

## 2021-04-10 RX ADMIN — FAMOTIDINE 20 MILLIGRAM(S): 10 INJECTION INTRAVENOUS at 12:38

## 2021-04-10 RX ADMIN — Medication 1 TABLET(S): at 12:38

## 2021-04-10 RX ADMIN — Medication 500 MILLIGRAM(S): at 12:38

## 2021-04-10 RX ADMIN — Medication 2000 UNIT(S): at 12:38

## 2021-04-10 RX ADMIN — LOSARTAN POTASSIUM 25 MILLIGRAM(S): 100 TABLET, FILM COATED ORAL at 05:06

## 2021-04-10 RX ADMIN — Medication 81 MILLIGRAM(S): at 12:38

## 2021-04-10 RX ADMIN — Medication 50 MILLIGRAM(S): at 05:06

## 2021-04-10 RX ADMIN — HEPARIN SODIUM 1200 UNIT(S)/HR: 5000 INJECTION INTRAVENOUS; SUBCUTANEOUS at 08:10

## 2021-04-10 NOTE — PROGRESS NOTE ADULT - SUBJECTIVE AND OBJECTIVE BOX
Patient is a 70y old  Male who presents with a chief complaint of shortness of breath (10 Apr 2021 14:54)    Date of servie : 04-10-21 @ 20:50  INTERVAL HPI/OVERNIGHT EVENTS:  T(C): 36.4 (04-10-21 @ 12:32), Max: 36.5 (04-09-21 @ 21:05)  HR: 59 (04-10-21 @ 18:35) (57 - 77)  BP: 109/53 (04-10-21 @ 18:35) (109/53 - 141/61)  RR: 18 (04-10-21 @ 12:32) (17 - 18)  SpO2: 97% (04-10-21 @ 12:32) (97% - 98%)  Wt(kg): --  I&O's Summary      LABS:                        11.4   10.36 )-----------( 154      ( 10 Apr 2021 07:08 )             35.8     04-10    137  |  104  |  28<H>  ----------------------------<  84  4.5   |  23  |  1.24    Ca    8.4      10 Apr 2021 07:08  Phos  3.3     04-09  Mg     2.4     04-10    TPro  6.0  /  Alb  2.7<L>  /  TBili  0.4  /  DBili  x   /  AST  12  /  ALT  28  /  AlkPhos  73  04-09    PTT - ( 10 Apr 2021 07:08 )  PTT:82.5 sec    CAPILLARY BLOOD GLUCOSE                MEDICATIONS  (STANDING):  aMIOdarone    Tablet 200 milliGRAM(s) Oral daily  ascorbic acid 500 milliGRAM(s) Oral daily  aspirin  chewable 81 milliGRAM(s) Oral daily  atorvastatin 20 milliGRAM(s) Oral at bedtime  cholecalciferol 2000 Unit(s) Oral daily  famotidine    Tablet 20 milliGRAM(s) Oral daily  heparin  Infusion.  Unit(s)/Hr (24 mL/Hr) IV Continuous <Continuous>  losartan 25 milliGRAM(s) Oral daily  metoprolol tartrate 50 milliGRAM(s) Oral two times a day  multivitamin 1 Tablet(s) Oral daily    MEDICATIONS  (PRN):  heparin   Injectable 64915 Unit(s) IV Push every 6 hours PRN For aPTT less than 40  heparin   Injectable 5000 Unit(s) IV Push every 6 hours PRN For aPTT between 40 - 57  ondansetron Injectable 4 milliGRAM(s) IV Push every 6 hours PRN Nausea          PHYSICAL EXAM:  GENERAL: NAD, well-groomed, well-developed  HEAD:  Atraumatic, Normocephalic  CHEST/LUNG: Clear to percussion bilaterally; No rales, rhonchi, wheezing, or rubs  HEART: Regular rate and rhythm; No murmurs, rubs, or gallops  ABDOMEN: Soft, Nontender, Nondistended; Bowel sounds present  EXTREMITIES:  2+ Peripheral Pulses, No clubbing, cyanosis, or edema  LYMPH: No lymphadenopathy noted  SKIN: No rashes or lesions    Care Discussed with Consultants/Other Providers [ ] YES  [ ] NO

## 2021-04-10 NOTE — PROGRESS NOTE ADULT - SUBJECTIVE AND OBJECTIVE BOX
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 on march 24th with COVID PNA, was found in NEW AF and started on Xarelto.  During that hospitalization he was noted with 18 beats NSVT and underwent a NST on 4/1 which revealed mild anterolateral ischemia and TID.  He was not a candidate for Trx for LHC given COVID+ status so he started on medical management.  He was discharged home on 4/2 and presented today to the ER with SOB and dizziness since last night.     Today denies CP SOB or palps.  ROS otherwise -    Review of Systems:   Constitutional: [ ] fevers, [ ] chills.   Skin: [ ] dry skin. [ ] rashes.  Psychiatric: [ ] depression, [ ] anxiety.   Gastrointestinal: [ ] BRBPR, [ ] melena.   Neurological: [ ] confusion. [ ] seizures. [ ] shuffling gait.   Ears,Nose,Mouth and Throat: [ ] ear pain [ ] sore throat.   Eyes: [ ] diplopia.   Respiratory: [ ] hemoptysis. [ ] shortness of breath  Cardiovascular: See HPI above  Hematologic/Lymphatic: [ ] anemia. [ ] painful nodes. [ ] prolonged bleeding.   Genitourinary: [ ] hematuria. [ ] flank pain.   Endocrine: [ ] significant change in weight. [ ] intolerance to heat and cold.     Review of systems [ x] otherwise negative, [ ] otherwise unable to obtain    FH: no family history of sudden cardiac death in first degree relatives    SH: [ ] tobacco, [ ] alcohol, [ ] drugs    aMIOdarone    Tablet 200 milliGRAM(s) Oral daily  ascorbic acid 500 milliGRAM(s) Oral daily  aspirin  chewable 81 milliGRAM(s) Oral daily  atorvastatin 20 milliGRAM(s) Oral at bedtime  cholecalciferol 2000 Unit(s) Oral daily  famotidine    Tablet 20 milliGRAM(s) Oral daily  heparin   Injectable 88109 Unit(s) IV Push every 6 hours PRN  heparin   Injectable 5000 Unit(s) IV Push every 6 hours PRN  heparin  Infusion.  Unit(s)/Hr IV Continuous <Continuous>  losartan 25 milliGRAM(s) Oral daily  metoprolol tartrate 50 milliGRAM(s) Oral two times a day  multivitamin 1 Tablet(s) Oral daily  ondansetron Injectable 4 milliGRAM(s) IV Push every 6 hours PRN                            11.4   10.36 )-----------( 154      ( 10 Apr 2021 07:08 )             35.8       04-10    137  |  104  |  28<H>  ----------------------------<  84  4.5   |  23  |  1.24    Ca    8.4      10 Apr 2021 07:08  Phos  3.3     04-09  Mg     2.4     04-10    TPro  6.0  /  Alb  2.7<L>  /  TBili  0.4  /  DBili  x   /  AST  12  /  ALT  28  /  AlkPhos  73  04-09    T(C): 36.4 (04-10-21 @ 12:32), Max: 36.8 (04-09-21 @ 17:44)  HR: 57 (04-10-21 @ 12:40) (56 - 77)  BP: 113/50 (04-10-21 @ 12:40) (113/50 - 141/61)  RR: 18 (04-10-21 @ 12:32) (17 - 18)  SpO2: 97% (04-10-21 @ 12:32) (97% - 98%)    General: Well nourished in no acute distress. Alert and Oriented * 3.   Head: Normocephalic and atraumatic.   Neck: No JVD. No bruits. Supple. Does not appear to be enlarged.   Cardiovascular: + S1,S2 ; RRR Soft systolic murmur at the left lower sternal border. No rubs noted.    Lungs: CTA b/l. No rhonchi, rales or wheezes.   Abdomen: + BS, soft. Non tender. Non distended. No rebound. No guarding.   Extremities: No clubbing/cyanosis/edema.   Neurologic: Moves all four extremities. Full range of motion.   Skin: Warm and moist. The patient's skin has normal elasticity and good skin turgor.   Psychiatric: Appropriate mood and affect.  Musculoskeletal: Normal range of motion, normal strength        DATA:    tele- AF 60s     < from: NM Nuclear Stress Pharmacologic Multiple (04.01.21 @ 14:37) >  IMPRESSION:  Abnormal  SPECT Myocardial Perfusion Imaging post rest and post vasodilator suspicious for mild anterolateral ischemia.         Normal left ventricular wall motion with ejection fraction of 52 % (normal: 50% or greater).       No regional wall motion abnormalities.       TID present, may represent subendocardial ischemia      Please refer to cardiac stress test report.    Comparison: No prior study available.    < end of copied text >    < from: TTE Echo Complete w/o Contrast w/ Doppler (03.26.21 @ 13:55) >  Summary:   1. Left ventricular ejection fraction, by visual estimation, is 50 to 55%.   2. Mildly decreased global left ventricular systolic function.   3. Mildly increased LV wall thickness.   4. Normal left ventricular internal cavity size.   5. There is mild concentric left ventricular hypertrophy.   6. Mildly enlarged left atrium.   7. Thickening of the anterior and posterior mitral valve leaflets.   8. Trace mitral valve regurgitation.   9. Mild-moderate tricuspid regurgitation.  10. Estimated pulmonary artery systolic pressure is 51.6 mmHg assuming a right atrial pressure of 10 mmHg, which is consistent with moderate pulmonary hypertension.    < end of copied text >    < from: TTE with Doppler (w/Cont) (04.08.21 @ 12:34) >  CONCLUSIONS:  Technically difficult study.  1. Mitral annular calcification, otherwise normal mitral  valve. Minimal mitral regurgitation.  2. Aortic valve leaflet morphology not well visualized.  Mild aortic regurgitation.  3. Endocardium not well visualized; grossly normal left  ventricular systolic function.  Endocardial visualization  enhanced with intravenous injection of echo contrast  (Definity).  4. Unable to accurately evaluate right ventricular size or  systolic function.  A device wire is noted in the right  heart.  ------------------------------------------------------------------------  Confirmed on  4/8/2021 - 13:48:17 by Jhonatan Chavez M.D.,  [facility], [facility]    < end of copied text >      < from: CT Angio Chest w/ IV Cont (04.08.21 @ 15:37) >  IMPRESSION:    No pulmonary embolism.      Diffuse bilateral peripheral opacities suggestive of COVID19.    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 on march 24th with COVID PNA, was found in NEW AF and started on Xarelto.  During that hospitalization he was noted with 18 beats NSVT and underwent a NST on 4/1 which revealed mild anterolateral ischemia and TID.  He was not a candidate for Trx for LHC given COVID+ status so he started on medical management.  He was discharged home on 4/2 and presented today to the ER with SOB and dizziness since last night.     --Pt with indeterminate Trop T but no acute ischemic EKG changes  --Cardio chart note from 4/2* noted  --repeat TTE noted above with normal LV function   --Held Xarelto and start Heparin Gtt in anticipation of further cardiac work up pending above  --ID eval appreciated  --plan for LHC next week

## 2021-04-10 NOTE — PROGRESS NOTE ADULT - ASSESSMENT
70 M with h/o bradycardia s/p ppm, recent admission for hypoxia secondary to COVID-19 complicated by Afib/V tach presenting with shortness of breath and lightheadedness.    Problem/Plan - 1:  ·  Problem: Lightheadedness.  Plan: - Etiology unclear. Pt had recent abnormal stress. Also possible this may be arrhythmia related  - Trops flat  - Monitor on tele   - management as per cards    Problem/Plan - 2:  ·  Problem: Abnormal stress test.  Plan: - Pt declined cardiac cath last admission but amenable now   - Monitor on tele   - Cardiology consult appreciated    Problem/Plan - 3:  ·  Problem: COVID-19.  Plan: - Completed course Decadron and Remdesivir  - Symptoms improved. No longer hypoxic  - Will continue to monitor. Supportive care.     Problem/Plan - 4:  ·  Problem: Atrial fibrillation, unspecified type.  Plan: - Continue Metoprolol and Xarelto.     Problem/Plan - 5:  ·  Problem: SVT (supraventricular tachycardia).  Plan: - Continue Amiodarone.

## 2021-04-11 LAB
ANION GAP SERPL CALC-SCNC: 7 MMOL/L — SIGNIFICANT CHANGE UP (ref 7–14)
ANION GAP SERPL CALC-SCNC: 9 MMOL/L — SIGNIFICANT CHANGE UP (ref 7–14)
APTT BLD: 102 SEC — HIGH (ref 27–36.3)
APTT BLD: 45.9 SEC — HIGH (ref 27–36.3)
APTT BLD: 51 SEC — HIGH (ref 27–36.3)
BUN SERPL-MCNC: 23 MG/DL — SIGNIFICANT CHANGE UP (ref 7–23)
BUN SERPL-MCNC: 26 MG/DL — HIGH (ref 7–23)
CALCIUM SERPL-MCNC: 8.5 MG/DL — SIGNIFICANT CHANGE UP (ref 8.4–10.5)
CALCIUM SERPL-MCNC: 8.8 MG/DL — SIGNIFICANT CHANGE UP (ref 8.4–10.5)
CHLORIDE SERPL-SCNC: 102 MMOL/L — SIGNIFICANT CHANGE UP (ref 98–107)
CHLORIDE SERPL-SCNC: 103 MMOL/L — SIGNIFICANT CHANGE UP (ref 98–107)
CO2 SERPL-SCNC: 22 MMOL/L — SIGNIFICANT CHANGE UP (ref 22–31)
CO2 SERPL-SCNC: 28 MMOL/L — SIGNIFICANT CHANGE UP (ref 22–31)
CREAT SERPL-MCNC: 1.26 MG/DL — SIGNIFICANT CHANGE UP (ref 0.5–1.3)
CREAT SERPL-MCNC: 1.26 MG/DL — SIGNIFICANT CHANGE UP (ref 0.5–1.3)
GLUCOSE SERPL-MCNC: 83 MG/DL — SIGNIFICANT CHANGE UP (ref 70–99)
GLUCOSE SERPL-MCNC: 99 MG/DL — SIGNIFICANT CHANGE UP (ref 70–99)
HCT VFR BLD CALC: 37.4 % — LOW (ref 39–50)
HGB BLD-MCNC: 11.9 G/DL — LOW (ref 13–17)
MAGNESIUM SERPL-MCNC: 2.2 MG/DL — SIGNIFICANT CHANGE UP (ref 1.6–2.6)
MCHC RBC-ENTMCNC: 29.5 PG — SIGNIFICANT CHANGE UP (ref 27–34)
MCHC RBC-ENTMCNC: 31.8 GM/DL — LOW (ref 32–36)
MCV RBC AUTO: 92.6 FL — SIGNIFICANT CHANGE UP (ref 80–100)
NRBC # BLD: 0 /100 WBCS — SIGNIFICANT CHANGE UP
NRBC # FLD: 0 K/UL — SIGNIFICANT CHANGE UP
PHOSPHATE SERPL-MCNC: 3.8 MG/DL — SIGNIFICANT CHANGE UP (ref 2.5–4.5)
PLATELET # BLD AUTO: 185 K/UL — SIGNIFICANT CHANGE UP (ref 150–400)
POTASSIUM SERPL-MCNC: 4.6 MMOL/L — SIGNIFICANT CHANGE UP (ref 3.5–5.3)
POTASSIUM SERPL-MCNC: 5.4 MMOL/L — HIGH (ref 3.5–5.3)
POTASSIUM SERPL-SCNC: 4.6 MMOL/L — SIGNIFICANT CHANGE UP (ref 3.5–5.3)
POTASSIUM SERPL-SCNC: 5.4 MMOL/L — HIGH (ref 3.5–5.3)
RBC # BLD: 4.04 M/UL — LOW (ref 4.2–5.8)
RBC # FLD: 14.3 % — SIGNIFICANT CHANGE UP (ref 10.3–14.5)
SARS-COV-2 RNA SPEC QL NAA+PROBE: DETECTED
SODIUM SERPL-SCNC: 134 MMOL/L — LOW (ref 135–145)
SODIUM SERPL-SCNC: 137 MMOL/L — SIGNIFICANT CHANGE UP (ref 135–145)
WBC # BLD: 8.98 K/UL — SIGNIFICANT CHANGE UP (ref 3.8–10.5)
WBC # FLD AUTO: 8.98 K/UL — SIGNIFICANT CHANGE UP (ref 3.8–10.5)

## 2021-04-11 RX ADMIN — HEPARIN SODIUM 5000 UNIT(S): 5000 INJECTION INTRAVENOUS; SUBCUTANEOUS at 05:16

## 2021-04-11 RX ADMIN — ATORVASTATIN CALCIUM 20 MILLIGRAM(S): 80 TABLET, FILM COATED ORAL at 20:57

## 2021-04-11 RX ADMIN — HEPARIN SODIUM 1500 UNIT(S)/HR: 5000 INJECTION INTRAVENOUS; SUBCUTANEOUS at 19:19

## 2021-04-11 RX ADMIN — Medication 50 MILLIGRAM(S): at 17:33

## 2021-04-11 RX ADMIN — FAMOTIDINE 20 MILLIGRAM(S): 10 INJECTION INTRAVENOUS at 12:26

## 2021-04-11 RX ADMIN — Medication 1 TABLET(S): at 12:26

## 2021-04-11 RX ADMIN — AMIODARONE HYDROCHLORIDE 200 MILLIGRAM(S): 400 TABLET ORAL at 05:16

## 2021-04-11 RX ADMIN — HEPARIN SODIUM 1500 UNIT(S)/HR: 5000 INJECTION INTRAVENOUS; SUBCUTANEOUS at 05:16

## 2021-04-11 RX ADMIN — HEPARIN SODIUM 5000 UNIT(S): 5000 INJECTION INTRAVENOUS; SUBCUTANEOUS at 19:20

## 2021-04-11 RX ADMIN — Medication 2000 UNIT(S): at 12:26

## 2021-04-11 RX ADMIN — Medication 500 MILLIGRAM(S): at 12:26

## 2021-04-11 RX ADMIN — LOSARTAN POTASSIUM 25 MILLIGRAM(S): 100 TABLET, FILM COATED ORAL at 05:16

## 2021-04-11 RX ADMIN — Medication 81 MILLIGRAM(S): at 12:26

## 2021-04-11 RX ADMIN — HEPARIN SODIUM 1200 UNIT(S)/HR: 5000 INJECTION INTRAVENOUS; SUBCUTANEOUS at 12:26

## 2021-04-11 NOTE — CHART NOTE - NSCHARTNOTEFT_GEN_A_CORE
Pt pending LHC on Monday, preprocedure COVID PCR done 4/11, remains positive. Pt with prior hx of COVID, evaluated by ID, deemed prolonged positivity due to dead viral shredding. No evidence of active infection. No Isolation needed as per Infection control.

## 2021-04-11 NOTE — PROGRESS NOTE ADULT - SUBJECTIVE AND OBJECTIVE BOX
Patient is a 70y old  Male who presents with a chief complaint of shortness of breath (11 Apr 2021 09:00)    Date of servie : 04-11-21 @ 15:54  INTERVAL HPI/OVERNIGHT EVENTS:  T(C): 36.4 (04-11-21 @ 12:50), Max: 36.6 (04-10-21 @ 20:40)  HR: 59 (04-11-21 @ 12:50) (44 - 59)  BP: 130/61 (04-11-21 @ 12:50) (109/53 - 135/64)  RR: 18 (04-11-21 @ 12:50) (16 - 18)  SpO2: 97% (04-11-21 @ 12:50) (97% - 98%)  Wt(kg): --  I&O's Summary    10 Apr 2021 07:01  -  11 Apr 2021 07:00  --------------------------------------------------------  IN: 404 mL / OUT: 800 mL / NET: -396 mL        LABS:                        11.9   8.98  )-----------( 185      ( 11 Apr 2021 04:26 )             37.4     04-11    137  |  102  |  23  ----------------------------<  83  4.6   |  28  |  1.26    Ca    8.8      11 Apr 2021 11:29  Phos  3.8     04-11  Mg     2.2     04-11      PTT - ( 11 Apr 2021 11:29 )  PTT:102.0 sec    CAPILLARY BLOOD GLUCOSE                MEDICATIONS  (STANDING):  aMIOdarone    Tablet 200 milliGRAM(s) Oral daily  ascorbic acid 500 milliGRAM(s) Oral daily  aspirin  chewable 81 milliGRAM(s) Oral daily  atorvastatin 20 milliGRAM(s) Oral at bedtime  cholecalciferol 2000 Unit(s) Oral daily  famotidine    Tablet 20 milliGRAM(s) Oral daily  heparin  Infusion.  Unit(s)/Hr (24 mL/Hr) IV Continuous <Continuous>  losartan 25 milliGRAM(s) Oral daily  metoprolol tartrate 50 milliGRAM(s) Oral two times a day  multivitamin 1 Tablet(s) Oral daily    MEDICATIONS  (PRN):  heparin   Injectable 77457 Unit(s) IV Push every 6 hours PRN For aPTT less than 40  heparin   Injectable 5000 Unit(s) IV Push every 6 hours PRN For aPTT between 40 - 57  ondansetron Injectable 4 milliGRAM(s) IV Push every 6 hours PRN Nausea          PHYSICAL EXAM:  GENERAL: NAD, well-groomed, well-developed  HEAD:  Atraumatic, Normocephalic  CHEST/LUNG: Clear to percussion bilaterally; No rales, rhonchi, wheezing, or rubs  HEART: Regular rate and rhythm; No murmurs, rubs, or gallops  ABDOMEN: Soft, Nontender, Nondistended; Bowel sounds present  EXTREMITIES:  2+ Peripheral Pulses, No clubbing, cyanosis, or edema  LYMPH: No lymphadenopathy noted  SKIN: No rashes or lesions    Care Discussed with Consultants/Other Providers [ ] YES  [ ] NO

## 2021-04-12 LAB
ANION GAP SERPL CALC-SCNC: 10 MMOL/L — SIGNIFICANT CHANGE UP (ref 7–14)
APTT BLD: 122.5 SEC — CRITICAL HIGH (ref 27–36.3)
APTT BLD: 94.2 SEC — HIGH (ref 27–36.3)
BUN SERPL-MCNC: 23 MG/DL — SIGNIFICANT CHANGE UP (ref 7–23)
CALCIUM SERPL-MCNC: 9 MG/DL — SIGNIFICANT CHANGE UP (ref 8.4–10.5)
CHLORIDE SERPL-SCNC: 102 MMOL/L — SIGNIFICANT CHANGE UP (ref 98–107)
CO2 SERPL-SCNC: 26 MMOL/L — SIGNIFICANT CHANGE UP (ref 22–31)
CREAT SERPL-MCNC: 1.38 MG/DL — HIGH (ref 0.5–1.3)
GLUCOSE SERPL-MCNC: 83 MG/DL — SIGNIFICANT CHANGE UP (ref 70–99)
HCT VFR BLD CALC: 35.5 % — LOW (ref 39–50)
HGB BLD-MCNC: 11.5 G/DL — LOW (ref 13–17)
INR BLD: 1.23 RATIO — HIGH (ref 0.88–1.16)
MAGNESIUM SERPL-MCNC: 2.1 MG/DL — SIGNIFICANT CHANGE UP (ref 1.6–2.6)
MCHC RBC-ENTMCNC: 30.2 PG — SIGNIFICANT CHANGE UP (ref 27–34)
MCHC RBC-ENTMCNC: 32.4 GM/DL — SIGNIFICANT CHANGE UP (ref 32–36)
MCV RBC AUTO: 93.2 FL — SIGNIFICANT CHANGE UP (ref 80–100)
NRBC # BLD: 0 /100 WBCS — SIGNIFICANT CHANGE UP
NRBC # FLD: 0 K/UL — SIGNIFICANT CHANGE UP
PHOSPHATE SERPL-MCNC: 3.9 MG/DL — SIGNIFICANT CHANGE UP (ref 2.5–4.5)
PLATELET # BLD AUTO: 190 K/UL — SIGNIFICANT CHANGE UP (ref 150–400)
POTASSIUM SERPL-MCNC: 4.4 MMOL/L — SIGNIFICANT CHANGE UP (ref 3.5–5.3)
POTASSIUM SERPL-SCNC: 4.4 MMOL/L — SIGNIFICANT CHANGE UP (ref 3.5–5.3)
PROTHROM AB SERPL-ACNC: 14 SEC — HIGH (ref 10.6–13.6)
RBC # BLD: 3.81 M/UL — LOW (ref 4.2–5.8)
RBC # FLD: 14.3 % — SIGNIFICANT CHANGE UP (ref 10.3–14.5)
SODIUM SERPL-SCNC: 138 MMOL/L — SIGNIFICANT CHANGE UP (ref 135–145)
WBC # BLD: 8.03 K/UL — SIGNIFICANT CHANGE UP (ref 3.8–10.5)
WBC # FLD AUTO: 8.03 K/UL — SIGNIFICANT CHANGE UP (ref 3.8–10.5)

## 2021-04-12 RX ORDER — SODIUM CHLORIDE 9 MG/ML
1000 INJECTION INTRAMUSCULAR; INTRAVENOUS; SUBCUTANEOUS
Refills: 0 | Status: DISCONTINUED | OUTPATIENT
Start: 2021-04-12 | End: 2021-04-13

## 2021-04-12 RX ORDER — HEPARIN SODIUM 5000 [USP'U]/ML
1200 INJECTION INTRAVENOUS; SUBCUTANEOUS
Qty: 25000 | Refills: 0 | Status: DISCONTINUED | OUTPATIENT
Start: 2021-04-12 | End: 2021-04-13

## 2021-04-12 RX ORDER — HEPARIN SODIUM 5000 [USP'U]/ML
5000 INJECTION INTRAVENOUS; SUBCUTANEOUS EVERY 6 HOURS
Refills: 0 | Status: DISCONTINUED | OUTPATIENT
Start: 2021-04-12 | End: 2021-04-13

## 2021-04-12 RX ORDER — HEPARIN SODIUM 5000 [USP'U]/ML
10000 INJECTION INTRAVENOUS; SUBCUTANEOUS EVERY 6 HOURS
Refills: 0 | Status: DISCONTINUED | OUTPATIENT
Start: 2021-04-12 | End: 2021-04-13

## 2021-04-12 RX ADMIN — Medication 1 TABLET(S): at 13:17

## 2021-04-12 RX ADMIN — HEPARIN SODIUM 1200 UNIT(S)/HR: 5000 INJECTION INTRAVENOUS; SUBCUTANEOUS at 09:39

## 2021-04-12 RX ADMIN — Medication 500 MILLIGRAM(S): at 13:17

## 2021-04-12 RX ADMIN — Medication 50 MILLIGRAM(S): at 18:44

## 2021-04-12 RX ADMIN — AMIODARONE HYDROCHLORIDE 200 MILLIGRAM(S): 400 TABLET ORAL at 05:10

## 2021-04-12 RX ADMIN — HEPARIN SODIUM 1200 UNIT(S)/HR: 5000 INJECTION INTRAVENOUS; SUBCUTANEOUS at 23:16

## 2021-04-12 RX ADMIN — ATORVASTATIN CALCIUM 20 MILLIGRAM(S): 80 TABLET, FILM COATED ORAL at 21:56

## 2021-04-12 RX ADMIN — HEPARIN SODIUM 1200 UNIT(S)/HR: 5000 INJECTION INTRAVENOUS; SUBCUTANEOUS at 02:33

## 2021-04-12 RX ADMIN — SODIUM CHLORIDE 70 MILLILITER(S): 9 INJECTION INTRAMUSCULAR; INTRAVENOUS; SUBCUTANEOUS at 13:17

## 2021-04-12 RX ADMIN — Medication 2000 UNIT(S): at 13:17

## 2021-04-12 RX ADMIN — LOSARTAN POTASSIUM 25 MILLIGRAM(S): 100 TABLET, FILM COATED ORAL at 05:10

## 2021-04-12 RX ADMIN — FAMOTIDINE 20 MILLIGRAM(S): 10 INJECTION INTRAVENOUS at 13:17

## 2021-04-12 RX ADMIN — SODIUM CHLORIDE 70 MILLILITER(S): 9 INJECTION INTRAMUSCULAR; INTRAVENOUS; SUBCUTANEOUS at 17:21

## 2021-04-12 RX ADMIN — Medication 81 MILLIGRAM(S): at 13:17

## 2021-04-12 NOTE — CHART NOTE - NSCHARTNOTEFT_GEN_A_CORE
Pt s/p LHC revealing mRCA 70% small vessel, medical management. RRA access. Heparin gtt to resume 6 hours post band removal at 11:15pm. Xarelto to resume 4/13 pm. Signout given to ACP on 7N.

## 2021-04-12 NOTE — PROGRESS NOTE ADULT - ASSESSMENT
70 M with h/o bradycardia s/p ppm, recent admission for hypoxia secondary to COVID-19 complicated by Afib/V tach presenting with shortness of breath and lightheadedness.    Problem/Plan - 1:  ·  Problem: Lightheadedness.  Plan: - Etiology unclear. Pt had recent abnormal stress. Also possible this may be arrhythmia related  - Trops flat  - Monitor on tele   - management as per cards    Problem/Plan - 2:  ·  Problem: Abnormal stress test.  Plan: - Pt declined cardiac cath last admission but amenable now   - Monitor on tele   - Cardiology consult appreciated  - cath today   - renal clearence for ROMY     Problem/Plan - 3:  ·  Problem: COVID-19.  Plan: - Completed course Decadron and Remdesivir  - Symptoms improved. No longer hypoxic- Will continue to monitor. Supportive care.     Problem/Plan - 4:  ·  Problem: Atrial fibrillation, unspecified type.  Plan: - Continue Metoprolol and Xarelto.     Problem/Plan - 5:  ·  Problem: SVT (supraventricular tachycardia).  Plan: - Continue Amiodarone.

## 2021-04-12 NOTE — PROGRESS NOTE ADULT - ASSESSMENT
70 M with h/o bradycardia s/p ppm, recent admission for hypoxia secondary to COVID-19 complicated by Afib and SVT presenting with shortness of breath and lightheadedness.  Renal azotemia today     1 CVS-No renal objection to proceed with cath   Can cont the Losartan  2 Renal -Check UA and start gentle fluid for 24 hours      70 M with h/o bradycardia s/p ppm, recent admission for hypoxia secondary to COVID-19 complicated by Afib and SVT presenting with shortness of breath and lightheadedness.  Renal azotemia today   Painless hematuria    1 CVS-No renal objection to proceed with cath   Can cont the Losartan  2 Renal -Check UA and start gentle fluid for 24 hours   3 -He noticed bleeding today when he went to urinate.  Start with renal sono  Urine cytology as outpt and he may need  as well.  Nonsmoker of cigarettes but he has done cigars   4 Pulm-Outpt sleep study    May benefit from HCTZ    Sayed [facility]   [phone]

## 2021-04-12 NOTE — PROGRESS NOTE ADULT - SUBJECTIVE AND OBJECTIVE BOX
NEPHROLOGY - NSN    Patient seen and examined.    HPI:  70 M with h/o bradycardia s/p ppm, recent admission for hypoxia secondary to COVID-19 complicated by Afib and SVT presenting with shortness of breath and lightheadedness. Pt was treated for COVID with decadron and Remdesivir and symptoms improved besides some residual shortness of breath. Last night he felt lightheaded and called his cardiologist who advised him to come to the ED. He denies any chest pain or palpitations, no fevers or chills, no cough, no GI or  symptoms.      In ED pt was given ASA 162mg   VS:  145/58  99  98.0  22  98% on RA (07 Apr 2021 17:23)      PAST MEDICAL & SURGICAL HISTORY:  Pacemaker    Bradycardia    Pacemaker        MEDICATIONS  (STANDING):  aMIOdarone    Tablet 200 milliGRAM(s) Oral daily  ascorbic acid 500 milliGRAM(s) Oral daily  aspirin  chewable 81 milliGRAM(s) Oral daily  atorvastatin 20 milliGRAM(s) Oral at bedtime  cholecalciferol 2000 Unit(s) Oral daily  famotidine    Tablet 20 milliGRAM(s) Oral daily  heparin  Infusion.  Unit(s)/Hr (24 mL/Hr) IV Continuous <Continuous>  losartan 25 milliGRAM(s) Oral daily  metoprolol tartrate 50 milliGRAM(s) Oral two times a day  multivitamin 1 Tablet(s) Oral daily      Allergies    No Known Allergies    Intolerances        SOCIAL HISTORY:  Denies alcohol abuse, drug abuse or tobacco usage.     FAMILY HISTORY:  FH: HTN (hypertension)    FH: type 2 diabetes        VITALS:  T(C): 36.6 (04-12-21 @ 04:50), Max: 36.6 (04-12-21 @ 04:50)  HR: 58 (04-12-21 @ 08:17) (52 - 71)  BP: 130/64 (04-12-21 @ 08:17) (114/60 - 141/70)  RR: 18 (04-12-21 @ 04:50) (18 - 18)  SpO2: 97% (04-12-21 @ 04:50) (97% - 97%)    REVIEW OF SYSTEMS:  Denies any nausea, vomiting, diarrhea, fever or chills. Denies chest pain, SOB, focal weakness, hematuria or dysuria. Good oral intake and denies fatigue or weakness. All other pertinent systems are reviewed and are negative.    PHYSICAL EXAM:  Constitutional: NAD  HEENT: EOMI  Neck:  No JVD, supple   Respiratory: CTA B/L  Cardiovascular: S1 and S2, RRR  Gastrointestinal: + BS, soft, NT, ND  Extremities: No peripheral edema, + peripheral pulses  Neurological: A/O x 3, CN2-12 intact  Psychiatric: Normal mood, normal affect  : No Moeller  Skin: No rashes, C/D/I  Access: Not applicable    I and O's:    04-10 @ 07:01  -  04-11 @ 07:00  --------------------------------------------------------  IN: 404 mL / OUT: 800 mL / NET: -396 mL          LABS:                        11.5   8.03  )-----------( 190      ( 12 Apr 2021 07:36 )             35.5     04-12    138  |  102  |  23  ----------------------------<  83  4.4   |  26  |  1.38<H>    Ca    9.0      12 Apr 2021 07:36  Phos  3.9     04-12  Mg     2.1     04-12        URINE:      RADIOLOGY & ADDITIONAL STUDIES:     NEPHROLOGY - NSN    Patient seen and examined.    HPI:  70 M with h/o bradycardia s/p ppm, recent admission for hypoxia secondary to COVID-19 complicated by Afib and SVT presenting with shortness of breath and lightheadedness. Pt was treated for COVID with decadron and Remdesivir and symptoms improved besides some residual shortness of breath. Last night he felt lightheaded and called his cardiologist who advised him to come to the ED. He denies any chest pain or palpitations, no fevers or chills, no cough, no GI or  symptoms.  Today creatinine went to 1.3 and renal eval was called prior to cath  He has no renal insufficiency as far as he knows.  There is no hematuria or bubbles in the urine.  No history of NSAIDS or nephrolithisis.  The patient urinates once or twice in the night and there is no incontinence.  No family hx or renal disease or back pain.    No recent abx use.  No alleviating or aggravating factors with respect to the kidneys.   He is obese and may have PURVI but undiagnosed.  No DM  Recent COVID       In ED pt was given ASA 162mg   VS:  145/58  99  98.0  22  98% on RA (07 Apr 2021 17:23)      PAST MEDICAL & SURGICAL HISTORY:  Pacemaker    Bradycardia    Pacemaker        MEDICATIONS  (STANDING):  aMIOdarone    Tablet 200 milliGRAM(s) Oral daily  ascorbic acid 500 milliGRAM(s) Oral daily  aspirin  chewable 81 milliGRAM(s) Oral daily  atorvastatin 20 milliGRAM(s) Oral at bedtime  cholecalciferol 2000 Unit(s) Oral daily  famotidine    Tablet 20 milliGRAM(s) Oral daily  heparin  Infusion.  Unit(s)/Hr (24 mL/Hr) IV Continuous <Continuous>  losartan 25 milliGRAM(s) Oral daily  metoprolol tartrate 50 milliGRAM(s) Oral two times a day  multivitamin 1 Tablet(s) Oral daily      Allergies    No Known Allergies    Intolerances        SOCIAL HISTORY:  Denies alcohol abuse, drug abuse or tobacco usage.     FAMILY HISTORY:  FH: HTN (hypertension)    FH: type 2 diabetes        VITALS:  T(C): 36.6 (04-12-21 @ 04:50), Max: 36.6 (04-12-21 @ 04:50)  HR: 58 (04-12-21 @ 08:17) (52 - 71)  BP: 130/64 (04-12-21 @ 08:17) (114/60 - 141/70)  RR: 18 (04-12-21 @ 04:50) (18 - 18)  SpO2: 97% (04-12-21 @ 04:50) (97% - 97%)    REVIEW OF SYSTEMS:  Denies any nausea, vomiting, diarrhea, fever or chills. Denies chest pain, SOB, focal weakness, hematuria or dysuria. Good oral intake and denies fatigue or weakness. All other pertinent systems are reviewed and are negative.    PHYSICAL EXAM:  Constitutional: obese   HEENT: EOMI  Neck:  No JVD, supple   Respiratory: reduced sounds   Cardiovascular: S1 and S2, RRR  Gastrointestinal: + BS, soft, NT, ND  Extremities: +  peripheral edema, + peripheral pulses  Neurological: A/O x 3, CN2-12 intact  Psychiatric: Normal mood, normal affect  : No Moeller  Skin: No rashes, C/D/I  Access: Not applicable    I and O's:    04-10 @ 07:01  -  04-11 @ 07:00  --------------------------------------------------------  IN: 404 mL / OUT: 800 mL / NET: -396 mL          LABS:                        11.5   8.03  )-----------( 190      ( 12 Apr 2021 07:36 )             35.5     04-12    138  |  102  |  23  ----------------------------<  83  4.4   |  26  |  1.38<H>    Ca    9.0      12 Apr 2021 07:36  Phos  3.9     04-12  Mg     2.1     04-12        URINE:      RADIOLOGY & ADDITIONAL STUDIES:

## 2021-04-12 NOTE — PROGRESS NOTE ADULT - SUBJECTIVE AND OBJECTIVE BOX
Patient is a 70y old  Male who presents with a chief complaint of shortness of breath (12 Apr 2021 13:42)      INTERVAL HPI/OVERNIGHT EVENTS:  T(C): 36.4 (04-12-21 @ 12:46), Max: 36.6 (04-12-21 @ 04:50)  HR: 62 (04-12-21 @ 12:46) (52 - 71)  BP: 121/57 (04-12-21 @ 12:46) (114/60 - 141/70)  RR: 18 (04-12-21 @ 12:46) (18 - 18)  SpO2: 100% (04-12-21 @ 12:46) (97% - 100%)  Wt(kg): --  I&O's Summary      LABS:                        11.5   8.03  )-----------( 190      ( 12 Apr 2021 07:36 )             35.5     04-12    138  |  102  |  23  ----------------------------<  83  4.4   |  26  |  1.38<H>    Ca    9.0      12 Apr 2021 07:36  Phos  3.9     04-12  Mg     2.1     04-12      PT/INR - ( 12 Apr 2021 07:36 )   PT: 14.0 sec;   INR: 1.23 ratio         PTT - ( 12 Apr 2021 07:36 )  PTT:94.2 sec    CAPILLARY BLOOD GLUCOSE                MEDICATIONS  (STANDING):  aMIOdarone    Tablet 200 milliGRAM(s) Oral daily  ascorbic acid 500 milliGRAM(s) Oral daily  aspirin  chewable 81 milliGRAM(s) Oral daily  atorvastatin 20 milliGRAM(s) Oral at bedtime  cholecalciferol 2000 Unit(s) Oral daily  famotidine    Tablet 20 milliGRAM(s) Oral daily  heparin  Infusion.  Unit(s)/Hr (24 mL/Hr) IV Continuous <Continuous>  losartan 25 milliGRAM(s) Oral daily  metoprolol tartrate 50 milliGRAM(s) Oral two times a day  multivitamin 1 Tablet(s) Oral daily  sodium chloride 0.9%. 1000 milliLiter(s) (70 mL/Hr) IV Continuous <Continuous>    MEDICATIONS  (PRN):  heparin   Injectable 70691 Unit(s) IV Push every 6 hours PRN For aPTT less than 40  heparin   Injectable 5000 Unit(s) IV Push every 6 hours PRN For aPTT between 40 - 57  ondansetron Injectable 4 milliGRAM(s) IV Push every 6 hours PRN Nausea      REVIEW OF SYSTEMS:  CONSTITUTIONAL: No fever, weight loss, or fatigue  EYES: No eye pain, visual disturbances, or discharge  ENMT:  No difficulty hearing, tinnitus, vertigo; No sinus or throat pain  NECK: No pain or stiffness  RESPIRATORY: No cough, wheezing, chills or hemoptysis; No shortness of breath  CARDIOVASCULAR: No chest pain, palpitations, dizziness, or leg swelling  GASTROINTESTINAL: No abdominal or epigastric pain. No nausea, vomiting, or hematemesis; No diarrhea or constipation. No melena or hematochezia.  GENITOURINARY: No dysuria, frequency, hematuria, or incontinence  NEUROLOGICAL: No headaches, memory loss, loss of strength, numbness, or tremors  SKIN: No itching, burning, rashes, or lesions   LYMPH NODES: No enlarged glands  ENDOCRINE: No heat or cold intolerance; No hair loss  MUSCULOSKELETAL: No joint pain or swelling; No muscle, back, or extremity pain  PSYCHIATRIC: No depression, anxiety, mood swings, or difficulty sleeping  HEME/LYMPH: No easy bruising, or bleeding gums  ALLERY AND IMMUNOLOGIC: No hives or eczema    RADIOLOGY & ADDITIONAL TESTS:    Imaging Personally Reviewed:  [ ] YES  [ ] NO    Consultant(s) Notes Reviewed:  [ ] YES  [ ] NO    PHYSICAL EXAM:  GENERAL: NAD, well-groomed, well-developed  HEAD:  Atraumatic, Normocephalic  EYES: EOMI, PERRLA, conjunctiva and sclera clear  ENMT: No tonsillar erythema, exudates, or enlargement; Moist mucous membranes, Good dentition, No lesions  NECK: Supple, No JVD, Normal thyroid  NERVOUS SYSTEM:  Alert & Oriented X3, Good concentration; Motor Strength 5/5 B/L upper and lower extremities; DTRs 2+ intact and symmetric  CHEST/LUNG: Clear to percussion bilaterally; No rales, rhonchi, wheezing, or rubs  HEART: Regular rate and rhythm; No murmurs, rubs, or gallops  ABDOMEN: Soft, Nontender, Nondistended; Bowel sounds present  EXTREMITIES:  2+ Peripheral Pulses, No clubbing, cyanosis, or edema  LYMPH: No lymphadenopathy noted  SKIN: No rashes or lesions    Care Discussed with Consultants/Other Providers [ ] YES  [ ] NO

## 2021-04-12 NOTE — PROVIDER CONTACT NOTE (CRITICAL VALUE NOTIFICATION) - BACKGROUND
Pt admitted with dizziness, SOB, positive outpatient stress test. Pt scheduled for cardiac cath 4/13.

## 2021-04-12 NOTE — PROGRESS NOTE ADULT - SUBJECTIVE AND OBJECTIVE BOX
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 on march 24th with COVID PNA, was found in NEW AF and started on Xarelto.  During that hospitalization he was noted with 18 beats NSVT and underwent a NST on 4/1 which revealed mild anterolateral ischemia and TID.  He was not a candidate for Trx for LHC given COVID+ status so he started on medical management.  He was discharged home on 4/2 and presented today to the ER with SOB and dizziness since last night.     S: no chest pain or sob; ros otherwise negative.     Review of Systems:   Constitutional: [ ] fevers, [ ] chills.   Skin: [ ] dry skin. [ ] rashes.  Psychiatric: [ ] depression, [ ] anxiety.   Gastrointestinal: [ ] BRBPR, [ ] melena.   Neurological: [ ] confusion. [ ] seizures. [ ] shuffling gait.   Ears,Nose,Mouth and Throat: [ ] ear pain [ ] sore throat.   Eyes: [ ] diplopia.   Respiratory: [ ] hemoptysis. [ ] shortness of breath  Cardiovascular: See HPI above  Hematologic/Lymphatic: [ ] anemia. [ ] painful nodes. [ ] prolonged bleeding.   Genitourinary: [ ] hematuria. [ ] flank pain.   Endocrine: [ ] significant change in weight. [ ] intolerance to heat and cold.     Review of systems [ x] otherwise negative, [ ] otherwise unable to obtain    FH: no family history of sudden cardiac death in first degree relatives    SH: [ ] tobacco, [ ] alcohol, [ ] drugs       aMIOdarone    Tablet 200 milliGRAM(s) Oral daily  ascorbic acid 500 milliGRAM(s) Oral daily  aspirin  chewable 81 milliGRAM(s) Oral daily  atorvastatin 20 milliGRAM(s) Oral at bedtime  cholecalciferol 2000 Unit(s) Oral daily  famotidine    Tablet 20 milliGRAM(s) Oral daily  losartan 25 milliGRAM(s) Oral daily  metoprolol tartrate 50 milliGRAM(s) Oral two times a day  multivitamin 1 Tablet(s) Oral daily  ondansetron Injectable 4 milliGRAM(s) IV Push every 6 hours PRN  sodium chloride 0.9%. 1000 milliLiter(s) IV Continuous <Continuous>                            11.5   8.03  )-----------( 190      ( 12 Apr 2021 07:36 )             35.5       04-12    138  |  102  |  23  ----------------------------<  83  4.4   |  26  |  1.38<H>    Ca    9.0      12 Apr 2021 07:36  Phos  3.9     04-12  Mg     2.1     04-12              T(C): 36.4 (04-12-21 @ 12:46), Max: 36.6 (04-12-21 @ 04:50)  HR: 62 (04-12-21 @ 12:46) (52 - 71)  BP: 121/57 (04-12-21 @ 12:46) (114/60 - 141/70)  RR: 18 (04-12-21 @ 12:46) (18 - 18)  SpO2: 100% (04-12-21 @ 12:46) (97% - 100%)  Wt(kg): --    I&O's Summary        General: Well nourished in no acute distress. Alert and Oriented * 3.   Head: Normocephalic and atraumatic.   Neck: No JVD. No bruits. Supple. Does not appear to be enlarged.   Cardiovascular: + S1,S2 ; RRR Soft systolic murmur at the left lower sternal border. No rubs noted.    Lungs: CTA b/l. No rhonchi, rales or wheezes.   Abdomen: + BS, soft. Non tender. Non distended. No rebound. No guarding.   Extremities: No clubbing/cyanosis/edema.   Neurologic: Moves all four extremities. Full range of motion.   Skin: Warm and moist. The patient's skin has normal elasticity and good skin turgor.   Psychiatric: Appropriate mood and affect.  Musculoskeletal: Normal range of motion, normal strength        DATA:    tele- AF 60s     < from: NM Nuclear Stress Pharmacologic Multiple (04.01.21 @ 14:37) >  IMPRESSION:  Abnormal  SPECT Myocardial Perfusion Imaging post rest and post vasodilator suspicious for mild anterolateral ischemia.         Normal left ventricular wall motion with ejection fraction of 52 % (normal: 50% or greater).       No regional wall motion abnormalities.       TID present, may represent subendocardial ischemia      Please refer to cardiac stress test report.    Comparison: No prior study available.    < end of copied text >    < from: TTE Echo Complete w/o Contrast w/ Doppler (03.26.21 @ 13:55) >  Summary:   1. Left ventricular ejection fraction, by visual estimation, is 50 to 55%.   2. Mildly decreased global left ventricular systolic function.   3. Mildly increased LV wall thickness.   4. Normal left ventricular internal cavity size.   5. There is mild concentric left ventricular hypertrophy.   6. Mildly enlarged left atrium.   7. Thickening of the anterior and posterior mitral valve leaflets.   8. Trace mitral valve regurgitation.   9. Mild-moderate tricuspid regurgitation.  10. Estimated pulmonary artery systolic pressure is 51.6 mmHg assuming a right atrial pressure of 10 mmHg, which is consistent with moderate pulmonary hypertension.    < end of copied text >    < from: TTE with Doppler (w/Cont) (04.08.21 @ 12:34) >  CONCLUSIONS:  Technically difficult study.  1. Mitral annular calcification, otherwise normal mitral  valve. Minimal mitral regurgitation.  2. Aortic valve leaflet morphology not well visualized.  Mild aortic regurgitation.  3. Endocardium not well visualized; grossly normal left  ventricular systolic function.  Endocardial visualization  enhanced with intravenous injection of echo contrast  (Definity).  4. Unable to accurately evaluate right ventricular size or  systolic function.  A device wire is noted in the right  heart.  ------------------------------------------------------------------------  Confirmed on  4/8/2021 - 13:48:17 by Jhonatan Chavez M.D.,  [facility], [facility]    < end of copied text >      < from: CT Angio Chest w/ IV Cont (04.08.21 @ 15:37) >  IMPRESSION:    No pulmonary embolism.      Diffuse bilateral peripheral opacities suggestive of COVID19.    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 on march 24th with COVID PNA, was found in NEW AF and started on Xarelto.  During that hospitalization he was noted with 18 beats NSVT and underwent a NST on 4/1 which revealed mild anterolateral ischemia and TID.  He was not a candidate for Trx for C given COVID+ status so he started on medical management.  He was discharged home on 4/2 and presented today to the ER with SOB and dizziness since last night.     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  During that hospitalization he was noted with 18 beats NSVT and underwent a NST on 4/1 which revealed mild anterolateral ischemia and TID.  He was not a candidate for Trx for LHC given COVID+ status so he started on medical management.  He was discharged home on 4/2 and presented today to the ER with SOB and dizziness since last night.     S: no chest pain or sob; ros otherwise negative.     Review of Systems:   Constitutional: [ ] fevers, [ ] chills.   Skin: [ ] dry skin. [ ] rashes.  Psychiatric: [ ] depression, [ ] anxiety.   Gastrointestinal: [ ] BRBPR, [ ] melena.   Neurological: [ ] confusion. [ ] seizures. [ ] shuffling gait.   Ears,Nose,Mouth and Throat: [ ] ear pain [ ] sore throat.   Eyes: [ ] diplopia.   Respiratory: [ ] hemoptysis. [ ] shortness of breath  Cardiovascular: See HPI above  Hematologic/Lymphatic: [ ] anemia. [ ] painful nodes. [ ] prolonged bleeding.   Genitourinary: [ ] hematuria. [ ] flank pain.   Endocrine: [ ] significant change in weight. [ ] intolerance to heat and cold.     Review of systems [ x] otherwise negative, [ ] otherwise unable to obtain    FH: no family history of sudden cardiac death in first degree relatives    SH: [ ] tobacco, [ ] alcohol, [ ] drugs       aMIOdarone    Tablet 200 milliGRAM(s) Oral daily  ascorbic acid 500 milliGRAM(s) Oral daily  aspirin  chewable 81 milliGRAM(s) Oral daily  atorvastatin 20 milliGRAM(s) Oral at bedtime  cholecalciferol 2000 Unit(s) Oral daily  famotidine    Tablet 20 milliGRAM(s) Oral daily  losartan 25 milliGRAM(s) Oral daily  metoprolol tartrate 50 milliGRAM(s) Oral two times a day  multivitamin 1 Tablet(s) Oral daily  ondansetron Injectable 4 milliGRAM(s) IV Push every 6 hours PRN  sodium chloride 0.9%. 1000 milliLiter(s) IV Continuous <Continuous>                            11.5   8.03  )-----------( 190      ( 12 Apr 2021 07:36 )             35.5       04-12    138  |  102  |  23  ----------------------------<  83  4.4   |  26  |  1.38<H>    Ca    9.0      12 Apr 2021 07:36  Phos  3.9     04-12  Mg     2.1     04-12              T(C): 36.4 (04-12-21 @ 12:46), Max: 36.6 (04-12-21 @ 04:50)  HR: 62 (04-12-21 @ 12:46) (52 - 71)  BP: 121/57 (04-12-21 @ 12:46) (114/60 - 141/70)  RR: 18 (04-12-21 @ 12:46) (18 - 18)  SpO2: 100% (04-12-21 @ 12:46) (97% - 100%)  Wt(kg): --    I&O's Summary        General: Well nourished in no acute distress. Alert and Oriented * 3.   Head: Normocephalic and atraumatic.   Neck: No JVD. No bruits. Supple. Does not appear to be enlarged.   Cardiovascular: + S1,S2 ; RRR Soft systolic murmur at the left lower sternal border. No rubs noted.    Lungs: CTA b/l. No rhonchi, rales or wheezes.   Abdomen: + BS, soft. Non tender. Non distended. No rebound. No guarding.   Extremities: No clubbing/cyanosis/edema.   Neurologic: Moves all four extremities. Full range of motion.   Skin: Warm and moist. The patient's skin has normal elasticity and good skin turgor.   Psychiatric: Appropriate mood and affect.  Musculoskeletal: Normal range of motion, normal strength        DATA:    tele- AF 60s     < from: NM Nuclear Stress Pharmacologic Multiple (04.01.21 @ 14:37) >  IMPRESSION:  Abnormal  SPECT Myocardial Perfusion Imaging post rest and post vasodilator suspicious for mild anterolateral ischemia.         Normal left ventricular wall motion with ejection fraction of 52 % (normal: 50% or greater).       No regional wall motion abnormalities.       TID present, may represent subendocardial ischemia      Please refer to cardiac stress test report.    Comparison: No prior study available.    < end of copied text >    < from: TTE Echo Complete w/o Contrast w/ Doppler (03.26.21 @ 13:55) >  Summary:   1. Left ventricular ejection fraction, by visual estimation, is 50 to 55%.   2. Mildly decreased global left ventricular systolic function.   3. Mildly increased LV wall thickness.   4. Normal left ventricular internal cavity size.   5. There is mild concentric left ventricular hypertrophy.   6. Mildly enlarged left atrium.   7. Thickening of the anterior and posterior mitral valve leaflets.   8. Trace mitral valve regurgitation.   9. Mild-moderate tricuspid regurgitation.  10. Estimated pulmonary artery systolic pressure is 51.6 mmHg assuming a right atrial pressure of 10 mmHg, which is consistent with moderate pulmonary hypertension.    < end of copied text >    < from: TTE with Doppler (w/Cont) (04.08.21 @ 12:34) >  CONCLUSIONS:  Technically difficult study.  1. Mitral annular calcification, otherwise normal mitral  valve. Minimal mitral regurgitation.  2. Aortic valve leaflet morphology not well visualized.  Mild aortic regurgitation.  3. Endocardium not well visualized; grossly normal left  ventricular systolic function.  Endocardial visualization  enhanced with intravenous injection of echo contrast  (Definity).  4. Unable to accurately evaluate right ventricular size or  systolic function.  A device wire is noted in the right  heart.  ------------------------------------------------------------------------  Confirmed on  4/8/2021 - 13:48:17 by Jhonatan Chavez M.D.,  [facility], [facility]    < end of copied text >      < from: CT Angio Chest w/ IV Cont (04.08.21 @ 15:37) >  IMPRESSION:    No pulmonary embolism.      Diffuse bilateral peripheral opacities suggestive of COVID19.    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  During that hospitalization he was noted with 18 beats NSVT and underwent a NST on 4/1 which revealed mild anterolateral ischemia and TID.  He was not a candidate for Trx for [facility] given COVID+ status so he started on medical management.  He was discharged home on 4/2 and presented today to the ER with SOB and dizziness since last night.     --Pt with indeterminate Trop T but no acute ischemic EKG changes  --Cardio chart note from 4/2* noted  --repeat TTE noted above with normal LV function   --all risks, benefits, indications, contraindications, inferior alternative options of cardiac cath explained to the patient.  He understood everything and elected for cardiac cath.  In addition, spoke with renal; optimized from renal perspective for cath today   --given above, cardiac cath performed demonstrating small vessel disease in the mid RCA - medical management recommended    Ann Marie Gipson MD

## 2021-04-12 NOTE — CHART NOTE - NSCHARTNOTEFT_GEN_A_CORE
Patient is s/p cardiac cath. Patient without any complaints.   Right radial access site - stable - dressing intact, clean, dry, no hematoma or bleed, radial pulse 2+, good capillary refill < 2 secs, pt able to flex, extend and squeeze his right hand digits without pain or difficulty.   Patient w h/o Afib - Heparin gtt (no bolus) resumed at 23:15   pt stable will continue to monitor

## 2021-04-12 NOTE — PROVIDER CONTACT NOTE (OTHER) - ASSESSMENT
Patient A+Ox4, heparin drip running at 12ml/hr as per heparin nomogram. Patient reports seeing a scant amount of blood in urine, denies having any other signs of bleeding, denies chest pain or SOB, no acute s/s of distress noted.

## 2021-04-12 NOTE — PROGRESS NOTE ADULT - SUBJECTIVE AND OBJECTIVE BOX
11.5   8.03  )-----------( 190      ( 12 Apr 2021 07:36 )             35.5       04-12    138  |  102  |  23  ----------------------------<  83  4.4   |  26  |  1.38<H>    Ca    9.0      12 Apr 2021 07:36  Phos  3.9     04-12  Mg     2.1     04-12                    PT/INR - ( 12 Apr 2021 07:36 )   PT: 14.0 sec;   INR: 1.23 ratio         PTT - ( 12 Apr 2021 07:36 )  PTT:94.2 sec    Lactate Trend            CAPILLARY BLOOD GLUCOSE            prog

## 2021-04-13 ENCOUNTER — TRANSCRIPTION ENCOUNTER (OUTPATIENT)
Age: 71
End: 2021-04-13

## 2021-04-13 VITALS
HEART RATE: 68 BPM | OXYGEN SATURATION: 97 % | DIASTOLIC BLOOD PRESSURE: 64 MMHG | SYSTOLIC BLOOD PRESSURE: 128 MMHG | RESPIRATION RATE: 18 BRPM | TEMPERATURE: 98 F

## 2021-04-13 LAB
ANION GAP SERPL CALC-SCNC: 9 MMOL/L — SIGNIFICANT CHANGE UP (ref 7–14)
APTT BLD: 55 SEC — HIGH (ref 27–36.3)
BUN SERPL-MCNC: 20 MG/DL — SIGNIFICANT CHANGE UP (ref 7–23)
CALCIUM SERPL-MCNC: 9 MG/DL — SIGNIFICANT CHANGE UP (ref 8.4–10.5)
CHLORIDE SERPL-SCNC: 103 MMOL/L — SIGNIFICANT CHANGE UP (ref 98–107)
CO2 SERPL-SCNC: 25 MMOL/L — SIGNIFICANT CHANGE UP (ref 22–31)
CREAT SERPL-MCNC: 1.17 MG/DL — SIGNIFICANT CHANGE UP (ref 0.5–1.3)
D DIMER BLD IA.RAPID-MCNC: 205 NG/ML DDU — SIGNIFICANT CHANGE UP
GLUCOSE SERPL-MCNC: 94 MG/DL — SIGNIFICANT CHANGE UP (ref 70–99)
HCT VFR BLD CALC: 36.5 % — LOW (ref 39–50)
HCT VFR BLD CALC: 36.5 % — LOW (ref 39–50)
HGB BLD-MCNC: 11.5 G/DL — LOW (ref 13–17)
HGB BLD-MCNC: 11.7 G/DL — LOW (ref 13–17)
MAGNESIUM SERPL-MCNC: 2.2 MG/DL — SIGNIFICANT CHANGE UP (ref 1.6–2.6)
MCHC RBC-ENTMCNC: 28.9 PG — SIGNIFICANT CHANGE UP (ref 27–34)
MCHC RBC-ENTMCNC: 29.6 PG — SIGNIFICANT CHANGE UP (ref 27–34)
MCHC RBC-ENTMCNC: 31.5 GM/DL — LOW (ref 32–36)
MCHC RBC-ENTMCNC: 32.1 GM/DL — SIGNIFICANT CHANGE UP (ref 32–36)
MCV RBC AUTO: 91.7 FL — SIGNIFICANT CHANGE UP (ref 80–100)
MCV RBC AUTO: 92.4 FL — SIGNIFICANT CHANGE UP (ref 80–100)
NRBC # BLD: 0 /100 WBCS — SIGNIFICANT CHANGE UP
NRBC # BLD: 0 /100 WBCS — SIGNIFICANT CHANGE UP
NRBC # FLD: 0 K/UL — SIGNIFICANT CHANGE UP
NRBC # FLD: 0 K/UL — SIGNIFICANT CHANGE UP
PHOSPHATE SERPL-MCNC: 4.2 MG/DL — SIGNIFICANT CHANGE UP (ref 2.5–4.5)
PLATELET # BLD AUTO: 170 K/UL — SIGNIFICANT CHANGE UP (ref 150–400)
PLATELET # BLD AUTO: 171 K/UL — SIGNIFICANT CHANGE UP (ref 150–400)
POTASSIUM SERPL-MCNC: 4.1 MMOL/L — SIGNIFICANT CHANGE UP (ref 3.5–5.3)
POTASSIUM SERPL-SCNC: 4.1 MMOL/L — SIGNIFICANT CHANGE UP (ref 3.5–5.3)
RBC # BLD: 3.95 M/UL — LOW (ref 4.2–5.8)
RBC # BLD: 3.98 M/UL — LOW (ref 4.2–5.8)
RBC # FLD: 14.1 % — SIGNIFICANT CHANGE UP (ref 10.3–14.5)
RBC # FLD: 14.2 % — SIGNIFICANT CHANGE UP (ref 10.3–14.5)
SODIUM SERPL-SCNC: 137 MMOL/L — SIGNIFICANT CHANGE UP (ref 135–145)
WBC # BLD: 6.78 K/UL — SIGNIFICANT CHANGE UP (ref 3.8–10.5)
WBC # BLD: 6.98 K/UL — SIGNIFICANT CHANGE UP (ref 3.8–10.5)
WBC # FLD AUTO: 6.78 K/UL — SIGNIFICANT CHANGE UP (ref 3.8–10.5)
WBC # FLD AUTO: 6.98 K/UL — SIGNIFICANT CHANGE UP (ref 3.8–10.5)

## 2021-04-13 PROCEDURE — 99222 1ST HOSP IP/OBS MODERATE 55: CPT

## 2021-04-13 RX ORDER — RIVAROXABAN 15 MG-20MG
20 KIT ORAL
Refills: 0 | Status: DISCONTINUED | OUTPATIENT
Start: 2021-04-13 | End: 2021-04-13

## 2021-04-13 RX ADMIN — LOSARTAN POTASSIUM 25 MILLIGRAM(S): 100 TABLET, FILM COATED ORAL at 05:44

## 2021-04-13 RX ADMIN — Medication 81 MILLIGRAM(S): at 13:21

## 2021-04-13 RX ADMIN — FAMOTIDINE 20 MILLIGRAM(S): 10 INJECTION INTRAVENOUS at 13:22

## 2021-04-13 RX ADMIN — RIVAROXABAN 20 MILLIGRAM(S): KIT at 15:42

## 2021-04-13 RX ADMIN — HEPARIN SODIUM 1500 UNIT(S)/HR: 5000 INJECTION INTRAVENOUS; SUBCUTANEOUS at 06:26

## 2021-04-13 RX ADMIN — HEPARIN SODIUM 5000 UNIT(S): 5000 INJECTION INTRAVENOUS; SUBCUTANEOUS at 06:31

## 2021-04-13 RX ADMIN — AMIODARONE HYDROCHLORIDE 200 MILLIGRAM(S): 400 TABLET ORAL at 05:44

## 2021-04-13 RX ADMIN — Medication 500 MILLIGRAM(S): at 13:21

## 2021-04-13 RX ADMIN — Medication 1 TABLET(S): at 13:22

## 2021-04-13 RX ADMIN — Medication 2000 UNIT(S): at 13:22

## 2021-04-13 RX ADMIN — Medication 50 MILLIGRAM(S): at 17:29

## 2021-04-13 NOTE — PROGRESS NOTE ADULT - PROVIDER SPECIALTY LIST ADULT
Cardiology
Hospitalist
Cardiology
Hospitalist
Hospitalist
Cardiology
Hospitalist
Hospitalist
Cardiology
Hospitalist
Nephrology
Hospitalist
Nephrology

## 2021-04-13 NOTE — DISCHARGE NOTE PROVIDER - PROVIDER TOKENS
PROVIDER:[TOKEN:[[id number]]] PROVIDER:[TOKEN:[[id number]]],PROVIDER:[TOKEN:[[id number]]],PROVIDER:[TOKEN:[[id number]]]

## 2021-04-13 NOTE — DISCHARGE NOTE PROVIDER - NSDCMRMEDTOKEN_GEN_ALL_CORE_FT
amiodarone 200 mg oral tablet: 1 tab(s) orally once a day   ascorbic acid 500 mg oral tablet: 1 tab(s) orally once a day  aspirin 81 mg oral tablet, chewable: 1 tab(s) orally once a day  atorvastatin 20 mg oral tablet: 1 tab(s) orally once a day (at bedtime)  cholecalciferol oral tablet: 4000 unit(s) orally once a day  dexamethasone 6 mg oral tablet: 1 tab(s) orally once a day stop on 4/3  famotidine 20 mg oral tablet: 1 tab(s) orally once a day  losartan 25 mg oral tablet: 1 tab(s) orally once a day  metoprolol tartrate 50 mg oral tablet: 1 tab(s) orally 2 times a day  Multiple Vitamins oral tablet: 1 tab(s) orally once a day  rivaroxaban 20 mg oral tablet: 1 tab(s) orally once a day    amiodarone 200 mg oral tablet: 1 tab(s) orally once a day   ascorbic acid 500 mg oral tablet: 1 tab(s) orally once a day  aspirin 81 mg oral tablet, chewable: 1 tab(s) orally once a day  atorvastatin 20 mg oral tablet: 1 tab(s) orally once a day (at bedtime)  cholecalciferol oral tablet: 4000 unit(s) orally once a day  famotidine 20 mg oral tablet: 1 tab(s) orally once a day  losartan 25 mg oral tablet: 1 tab(s) orally once a day  metoprolol tartrate 50 mg oral tablet: 1 tab(s) orally 2 times a day  Multiple Vitamins oral tablet: 1 tab(s) orally once a day  rivaroxaban 20 mg oral tablet: 1 tab(s) orally once a day

## 2021-04-13 NOTE — PROGRESS NOTE ADULT - SUBJECTIVE AND OBJECTIVE BOX
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  During that hospitalization he was noted with 18 beats NSVT and underwent a NST on 4/1 which revealed mild anterolateral ischemia and TID.  He was not a candidate for Trx for LHC given COVID+ status so he started on medical management.  He was discharged home on 4/2 and presented today to the ER with SOB and dizziness since last night.     S: no chest pain or sob; ros otherwise negative.     Review of Systems:   Constitutional: [ ] fevers, [ ] chills.   Skin: [ ] dry skin. [ ] rashes.  Psychiatric: [ ] depression, [ ] anxiety.   Gastrointestinal: [ ] BRBPR, [ ] melena.   Neurological: [ ] confusion. [ ] seizures. [ ] shuffling gait.   Ears,Nose,Mouth and Throat: [ ] ear pain [ ] sore throat.   Eyes: [ ] diplopia.   Respiratory: [ ] hemoptysis. [ ] shortness of breath  Cardiovascular: See HPI above  Hematologic/Lymphatic: [ ] anemia. [ ] painful nodes. [ ] prolonged bleeding.   Genitourinary: [ ] hematuria. [ ] flank pain.   Endocrine: [ ] significant change in weight. [ ] intolerance to heat and cold.     Review of systems [ x] otherwise negative, [ ] otherwise unable to obtain    FH: no family history of sudden cardiac death in first degree relatives    SH: [ ] tobacco, [ ] alcohol, [ ] drugs       aMIOdarone    Tablet 200 milliGRAM(s) Oral daily  ascorbic acid 500 milliGRAM(s) Oral daily  aspirin  chewable 81 milliGRAM(s) Oral daily  atorvastatin 20 milliGRAM(s) Oral at bedtime  cholecalciferol 2000 Unit(s) Oral daily  famotidine    Tablet 20 milliGRAM(s) Oral daily  losartan 25 milliGRAM(s) Oral daily  metoprolol tartrate 50 milliGRAM(s) Oral two times a day  multivitamin 1 Tablet(s) Oral daily  ondansetron Injectable 4 milliGRAM(s) IV Push every 6 hours PRN  rivaroxaban 20 milliGRAM(s) Oral with dinner                            11.5   6.98  )-----------( 171      ( 13 Apr 2021 05:36 )             36.5       04-13    137  |  103  |  20  ----------------------------<  94  4.1   |  25  |  1.17    Ca    9.0      13 Apr 2021 05:36  Phos  4.2     04-13  Mg     2.2     04-13              T(C): 36.5 (04-13-21 @ 12:53), Max: 36.5 (04-13-21 @ 12:53)  HR: 63 (04-13-21 @ 12:53) (50 - 63)  BP: 118/60 (04-13-21 @ 12:53) (118/60 - 144/68)  RR: 18 (04-13-21 @ 12:53) (16 - 18)  SpO2: 97% (04-13-21 @ 12:53) (96% - 99%)  Wt(kg): --    I&O's Summary        General: Well nourished in no acute distress. Alert and Oriented * 3.   Head: Normocephalic and atraumatic.   Neck: No JVD. No bruits. Supple. Does not appear to be enlarged.   Cardiovascular: + S1,S2 ; RRR Soft systolic murmur at the left lower sternal border. No rubs noted.    Lungs: CTA b/l. No rhonchi, rales or wheezes.   Abdomen: + BS, soft. Non tender. Non distended. No rebound. No guarding.   Extremities: No clubbing/cyanosis/edema.   Neurologic: Moves all four extremities. Full range of motion.   Skin: Warm and moist. The patient's skin has normal elasticity and good skin turgor.   Psychiatric: Appropriate mood and affect.  Musculoskeletal: Normal range of motion, normal strength        DATA:    tele- AF 60s     < from: NM Nuclear Stress Pharmacologic Multiple (04.01.21 @ 14:37) >  IMPRESSION:  Abnormal  SPECT Myocardial Perfusion Imaging post rest and post vasodilator suspicious for mild anterolateral ischemia.         Normal left ventricular wall motion with ejection fraction of 52 % (normal: 50% or greater).       No regional wall motion abnormalities.       TID present, may represent subendocardial ischemia      Please refer to cardiac stress test report.    Comparison: No prior study available.    < end of copied text >    < from: TTE Echo Complete w/o Contrast w/ Doppler (03.26.21 @ 13:55) >  Summary:   1. Left ventricular ejection fraction, by visual estimation, is 50 to 55%.   2. Mildly decreased global left ventricular systolic function.   3. Mildly increased LV wall thickness.   4. Normal left ventricular internal cavity size.   5. There is mild concentric left ventricular hypertrophy.   6. Mildly enlarged left atrium.   7. Thickening of the anterior and posterior mitral valve leaflets.   8. Trace mitral valve regurgitation.   9. Mild-moderate tricuspid regurgitation.  10. Estimated pulmonary artery systolic pressure is 51.6 mmHg assuming a right atrial pressure of 10 mmHg, which is consistent with moderate pulmonary hypertension.    < end of copied text >    < from: TTE with Doppler (w/Cont) (04.08.21 @ 12:34) >  CONCLUSIONS:  Technically difficult study.  1. Mitral annular calcification, otherwise normal mitral  valve. Minimal mitral regurgitation.  2. Aortic valve leaflet morphology not well visualized.  Mild aortic regurgitation.  3. Endocardium not well visualized; grossly normal left  ventricular systolic function.  Endocardial visualization  enhanced with intravenous injection of echo contrast  (Definity).  4. Unable to accurately evaluate right ventricular size or  systolic function.  A device wire is noted in the right  heart.  ------------------------------------------------------------------------  Confirmed on  4/8/2021 - 13:48:17 by Jhonatan Chavez M.D.,  [facility], [facility]    < end of copied text >      < from: CT Angio Chest w/ IV Cont (04.08.21 @ 15:37) >  IMPRESSION:    No pulmonary embolism.      Diffuse bilateral peripheral opacities suggestive of COVID19.    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  During that hospitalization he was noted with 18 beats NSVT and underwent a NST on 4/1 which revealed mild anterolateral ischemia and TID.  He was not a candidate for Trx for [facility] given COVID+ status so he started on medical management.  He was discharged home on 4/2 and presented today to the ER with SOB and dizziness since last night.     --Pt with indeterminate Trop T but no acute ischemic EKG changes  --Cardio chart note from 4/2* noted  --repeat TTE noted above with normal LV function   --all risks, benefits, indications, contraindications, inferior alternative options of cardiac cath explained to the patient.  He understood everything and elected for cardiac cath.  In addition, spoke with renal; optimized from renal perspective for cath today   --given above, cardiac cath performed demonstrating small vessel disease in the mid RCA - medical management recommended  --no further cardiac workup planned at this time  --pt. to follow up with his primary cardiologist Dr. 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  During that hospitalization he was noted with 18 beats NSVT and underwent a NST on 4/1 which revealed mild anterolateral ischemia and TID.  He was not a candidate for Trx for LHC given COVID+ status so he started on medical management.  He was discharged home on 4/2 and presented today to the ER with SOB and dizziness since last night.     S: no chest pain or sob; ros otherwise negative.     Review of Systems:   Constitutional: [ ] fevers, [ ] chills.   Skin: [ ] dry skin. [ ] rashes.  Psychiatric: [ ] depression, [ ] anxiety.   Gastrointestinal: [ ] BRBPR, [ ] melena.   Neurological: [ ] confusion. [ ] seizures. [ ] shuffling gait.   Ears,Nose,Mouth and Throat: [ ] ear pain [ ] sore throat.   Eyes: [ ] diplopia.   Respiratory: [ ] hemoptysis. [ ] shortness of breath  Cardiovascular: See HPI above  Hematologic/Lymphatic: [ ] anemia. [ ] painful nodes. [ ] prolonged bleeding.   Genitourinary: [ ] hematuria. [ ] flank pain.   Endocrine: [ ] significant change in weight. [ ] intolerance to heat and cold.     Review of systems [ x] otherwise negative, [ ] otherwise unable to obtain    FH: no family history of sudden cardiac death in first degree relatives    SH: [ ] tobacco, [ ] alcohol, [ ] drugs       aMIOdarone    Tablet 200 milliGRAM(s) Oral daily  ascorbic acid 500 milliGRAM(s) Oral daily  aspirin  chewable 81 milliGRAM(s) Oral daily  atorvastatin 20 milliGRAM(s) Oral at bedtime  cholecalciferol 2000 Unit(s) Oral daily  famotidine    Tablet 20 milliGRAM(s) Oral daily  losartan 25 milliGRAM(s) Oral daily  metoprolol tartrate 50 milliGRAM(s) Oral two times a day  multivitamin 1 Tablet(s) Oral daily  ondansetron Injectable 4 milliGRAM(s) IV Push every 6 hours PRN  rivaroxaban 20 milliGRAM(s) Oral with dinner                            11.5   6.98  )-----------( 171      ( 13 Apr 2021 05:36 )             36.5       04-13    137  |  103  |  20  ----------------------------<  94  4.1   |  25  |  1.17    Ca    9.0      13 Apr 2021 05:36  Phos  4.2     04-13  Mg     2.2     04-13              T(C): 36.5 (04-13-21 @ 12:53), Max: 36.5 (04-13-21 @ 12:53)  HR: 63 (04-13-21 @ 12:53) (50 - 63)  BP: 118/60 (04-13-21 @ 12:53) (118/60 - 144/68)  RR: 18 (04-13-21 @ 12:53) (16 - 18)  SpO2: 97% (04-13-21 @ 12:53) (96% - 99%)  Wt(kg): --    I&O's Summary        General: Well nourished in no acute distress. Alert and Oriented * 3.   Head: Normocephalic and atraumatic.   Neck: No JVD. No bruits. Supple. Does not appear to be enlarged.   Cardiovascular: + S1,S2 ; RRR Soft systolic murmur at the left lower sternal border. No rubs noted.    Lungs: CTA b/l. No rhonchi, rales or wheezes.   Abdomen: + BS, soft. Non tender. Non distended. No rebound. No guarding.   Extremities: No clubbing/cyanosis/edema.   Neurologic: Moves all four extremities. Full range of motion.   Skin: Warm and moist. The patient's skin has normal elasticity and good skin turgor.   Psychiatric: Appropriate mood and affect.  Musculoskeletal: Normal range of motion, normal strength        DATA:    tele- AF 60s     < from: NM Nuclear Stress Pharmacologic Multiple (04.01.21 @ 14:37) >  IMPRESSION:  Abnormal  SPECT Myocardial Perfusion Imaging post rest and post vasodilator suspicious for mild anterolateral ischemia.         Normal left ventricular wall motion with ejection fraction of 52 % (normal: 50% or greater).       No regional wall motion abnormalities.       TID present, may represent subendocardial ischemia      Please refer to cardiac stress test report.    Comparison: No prior study available.    < end of copied text >    < from: TTE Echo Complete w/o Contrast w/ Doppler (03.26.21 @ 13:55) >  Summary:   1. Left ventricular ejection fraction, by visual estimation, is 50 to 55%.   2. Mildly decreased global left ventricular systolic function.   3. Mildly increased LV wall thickness.   4. Normal left ventricular internal cavity size.   5. There is mild concentric left ventricular hypertrophy.   6. Mildly enlarged left atrium.   7. Thickening of the anterior and posterior mitral valve leaflets.   8. Trace mitral valve regurgitation.   9. Mild-moderate tricuspid regurgitation.  10. Estimated pulmonary artery systolic pressure is 51.6 mmHg assuming a right atrial pressure of 10 mmHg, which is consistent with moderate pulmonary hypertension.    < end of copied text >    < from: TTE with Doppler (w/Cont) (04.08.21 @ 12:34) >  CONCLUSIONS:  Technically difficult study.  1. Mitral annular calcification, otherwise normal mitral  valve. Minimal mitral regurgitation.  2. Aortic valve leaflet morphology not well visualized.  Mild aortic regurgitation.  3. Endocardium not well visualized; grossly normal left  ventricular systolic function.  Endocardial visualization  enhanced with intravenous injection of echo contrast  (Definity).  4. Unable to accurately evaluate right ventricular size or  systolic function.  A device wire is noted in the right  heart.  ------------------------------------------------------------------------  Confirmed on  4/8/2021 - 13:48:17 by Jhonatan Chavez M.D.,  [facility], [facility]    < end of copied text >      < from: CT Angio Chest w/ IV Cont (04.08.21 @ 15:37) >  IMPRESSION:    No pulmonary embolism.      Diffuse bilateral peripheral opacities suggestive of COVID19.    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  During that hospitalization he was noted with 18 beats NSVT and underwent a NST on 4/1 which revealed mild anterolateral ischemia and TID.  He was not a candidate for Trx for [facility] given COVID+ status so he started on medical management.  He was discharged home on 4/2 and presented today to the ER with SOB and dizziness since last night.     --Pt with indeterminate Trop T but no acute ischemic EKG changes  --Cardio chart note from 4/2* noted  --repeat TTE noted above with normal LV function   --all risks, benefits, indications, contraindications, inferior alternative options of cardiac cath explained to the patient.  He understood everything and elected for cardiac cath.  In addition, spoke with renal; optimized from renal perspective for cath today   --given above, cardiac cath performed demonstrating small vessel disease in the mid RCA - medical management recommended  --no further interventional cardiac workup planned at this time  --follow up EP   --pt. to follow up with his primary cardiologist Dr. Smith after discharge     Ann Marie Gipson MD

## 2021-04-13 NOTE — PROGRESS NOTE ADULT - ASSESSMENT
70 M with h/o bradycardia s/p ppm, recent admission for hypoxia secondary to COVID-19 complicated by Afib and SVT presenting with shortness of breath and lightheadedness.  Renal azotemia today   Painless hematuria    1 CVS-No evidence of STEVE   2 Renal -Check UA    3 -He noticed bleeding today when he went to urinate.  Start with renal sono  Urine cytology as outpt and he may need  as well.  Nonsmoker of cigarettes but he has done cigars -- If he is being dc then Urology follow up and renal sono as outpt  4 Pulm-Outpt sleep study    May benefit from HCTZ    Sayed [facility]   [phone]

## 2021-04-13 NOTE — DISCHARGE NOTE PROVIDER - CARE PROVIDER_API CALL
Ann Marie Gipson (MD)  Cardiovascular Disease; Internal Medicine; Interventional Cardiology  [address] [place] [address]  Phone: [phone]  Fax: [phone]  Follow Up Time:    Ann Marie Gipson)  Cardiovascular Disease; Internal Medicine; Interventional Cardiology  [address] [place] [address]  Phone: [phone]  Fax: [phone]  Follow Up Time:     Mohamud Smith)  Cardiovascular Disease; Internal Medicine  [address] [place] [address], [address] [place] [address]  Phone: [phone]  Fax: [phone]  Follow Up Time:     Flavio Gonzalez)  Cardiac Electrophysiology; Cardiovascular Disease; Internal Medicine  [address] [place] [address], [address]  [place] [address]  Phone: [phone]  Fax: [phone]  Follow Up Time:

## 2021-04-13 NOTE — CONSULT NOTE ADULT - SUBJECTIVE AND OBJECTIVE BOX
Date of Admission: 4/7/2021    CHIEF COMPLAINT: SOB    HISTORY OF PRESENT ILLNESS:  This is a 70yoM w/ PMHx bradycardia s/p PPM, recent admission for COVID-19 at [place] during which admission was found to have afib and SVT and was started on amiodarone and xarelto.  Patient presents during this admission with lightheadedness and underwent a LHC for an abnormal stress test which showed mRCA 70% being medically managed.  PPM device interrogated and noted to only have afib episodes during March while admitted for COVID treatment, no episodes noted since.      Allergies  No Known Allergies    MEDICATIONS:  aMIOdarone    Tablet 200 milliGRAM(s) Oral daily  aspirin  chewable 81 milliGRAM(s) Oral daily  losartan 25 milliGRAM(s) Oral daily  metoprolol tartrate 50 milliGRAM(s) Oral two times a day  rivaroxaban 20 milliGRAM(s) Oral with dinner  ondansetron Injectable 4 milliGRAM(s) IV Push every 6 hours PRN  famotidine    Tablet 20 milliGRAM(s) Oral daily  atorvastatin 20 milliGRAM(s) Oral at bedtime  ascorbic acid 500 milliGRAM(s) Oral daily  cholecalciferol 2000 Unit(s) Oral daily  multivitamin 1 Tablet(s) Oral daily      PAST MEDICAL & SURGICAL HISTORY:  Pacemaker    Bradycardia    Pacemaker        FAMILY HISTORY:  FH: HTN (hypertension)    FH: type 2 diabetes        SOCIAL HISTORY:    [ ] Non-smoker  [ ] Smoker  [ ] Alcohol      REVIEW OF SYSTEMS:  See HPI. Otherwise, 10 point ROS done and otherwise negative.    PHYSICAL EXAM:  T(C): 36.6 (04-13-21 @ 17:28), Max: 36.6 (04-13-21 @ 17:28)  HR: 68 (04-13-21 @ 17:28) (50 - 68)  BP: 128/64 (04-13-21 @ 17:28) (118/60 - 144/68)  RR: 18 (04-13-21 @ 17:28) (16 - 18)  SpO2: 97% (04-13-21 @ 17:28) (96% - 99%)  Wt(kg): --  I&O's Summary      Appearance: Normal	  HEENT:   Normal oral mucosa, PERRL, EOMI	  Lymphatic: No lymphadenopathy  Cardiovascular: Normal S1 S2, No JVD, No murmurs, No edema  Respiratory: Lungs clear to auscultation	  Psychiatry: A & O x 3, Mood & affect appropriate  Gastrointestinal:  Soft, Non-tender, + BS	  Skin: No rashes, No ecchymoses, No cyanosis	  Neurologic: Non-focal  Extremities: Normal range of motion, No clubbing, cyanosis or edema  Vascular: Peripheral pulses palpable 2+ bilaterally        LABS:	 	    CBC Full  -  ( 13 Apr 2021 05:36 )  WBC Count : 6.98 K/uL  Hemoglobin : 11.5 g/dL  Hematocrit : 36.5 %  Platelet Count - Automated : 171 K/uL  Mean Cell Volume : 91.7 fL  Mean Cell Hemoglobin : 28.9 pg  Mean Cell Hemoglobin Concentration : 31.5 gm/dL  Auto Neutrophil # : x  Auto Lymphocyte # : x  Auto Monocyte # : x  Auto Eosinophil # : x  Auto Basophil # : x  Auto Neutrophil % : x  Auto Lymphocyte % : x  Auto Monocyte % : x  Auto Eosinophil % : x  Auto Basophil % : x    04-13    137  |  103  |  20  ----------------------------<  94  4.1   |  25  |  1.17  04-12    138  |  102  |  23  ----------------------------<  83  4.4   |  26  |  1.38<H>    Ca    9.0      13 Apr 2021 05:36  Ca    9.0      12 Apr 2021 07:36  Phos  4.2     04-13  Phos  3.9     04-12  Mg     2.2     04-13  Mg     2.1     04-12        proBNP:   Lipid Profile:   HgA1c:   TSH:       CARDIAC MARKERS:            TELEMETRY: 	    ECG:  	  RADIOLOGY:  OTHER: 	    PREVIOUS DIAGNOSTIC TESTING:    [ ] Echocardiogram:  [ ]  Catheterization:  [ ] Stress Test:  	  	  ASSESSMENT/PLAN: 	     Date of Admission: 4/7/2021    CHIEF COMPLAINT: SOB    HISTORY OF PRESENT ILLNESS:  This is a 70yoM w/ PMHx bradycardia s/p PPM, recent admission for COVID-19 at [place] during which admission was found to have afib and SVT and was started on amiodarone and xarelto.  Patient presents during this admission with lightheadedness and underwent a LHC for an abnormal stress test which showed mRCA 70% being medically managed.  PPM device interrogated and noted to only have afib episodes during March while admitted for COVID treatment, no episodes noted since.  Patient states he is feeling well and is ready to go home;  not currently requiring supplemental O2.      Allergies  No Known Allergies    MEDICATIONS:  aMIOdarone    Tablet 200 milliGRAM(s) Oral daily  aspirin  chewable 81 milliGRAM(s) Oral daily  losartan 25 milliGRAM(s) Oral daily  metoprolol tartrate 50 milliGRAM(s) Oral two times a day  rivaroxaban 20 milliGRAM(s) Oral with dinner  ondansetron Injectable 4 milliGRAM(s) IV Push every 6 hours PRN  famotidine    Tablet 20 milliGRAM(s) Oral daily  atorvastatin 20 milliGRAM(s) Oral at bedtime  ascorbic acid 500 milliGRAM(s) Oral daily  cholecalciferol 2000 Unit(s) Oral daily  multivitamin 1 Tablet(s) Oral daily      PAST MEDICAL & SURGICAL HISTORY:  Pacemaker  Bradycardia    FAMILY HISTORY:  FH: HTN (hypertension)    FH: type 2 diabetes    REVIEW OF SYSTEMS:  See HPI. Otherwise, 10 point ROS done and otherwise negative.    PHYSICAL EXAM:  T(C): 36.6 (04-13-21 @ 17:28), Max: 36.6 (04-13-21 @ 17:28)  HR: 68 (04-13-21 @ 17:28) (50 - 68)  BP: 128/64 (04-13-21 @ 17:28) (118/60 - 144/68)  RR: 18 (04-13-21 @ 17:28) (16 - 18)  SpO2: 97% (04-13-21 @ 17:28) (96% - 99%)  Wt(kg): --  I&O's Summary    Appearance: Normal	  HEENT:   Normal oral mucosa, PERRL, EOMI	  Lymphatic: No lymphadenopathy  Cardiovascular: Normal S1 S2, No JVD, No murmurs, No edema  Respiratory: Lungs clear to auscultation	  Psychiatry: A & O x 3, Mood & affect appropriate  Gastrointestinal:  Soft, Non-tender, + BS	  Skin: No rashes, No ecchymoses, No cyanosis	  Neurologic: Non-focal  Extremities: Normal range of motion, No clubbing, cyanosis or edema  Vascular: Peripheral pulses palpable 2+ bilaterally    LABS:	 	  CBC Full  -  ( 13 Apr 2021 05:36 )  WBC Count : 6.98 K/uL  Hemoglobin : 11.5 g/dL  Hematocrit : 36.5 %  Platelet Count - Automated : 171 K/uL  Mean Cell Volume : 91.7 fL  Mean Cell Hemoglobin : 28.9 pg  Mean Cell Hemoglobin Concentration : 31.5 gm/dL  Auto Neutrophil # : x  Auto Lymphocyte # : x  Auto Monocyte # : x  Auto Eosinophil # : x  Auto Basophil # : x  Auto Neutrophil % : x  Auto Lymphocyte % : x  Auto Monocyte % : x  Auto Eosinophil % : x  Auto Basophil % : x    04-13    137  |  103  |  20  ----------------------------<  94  4.1   |  25  |  1.17  04-12    138  |  102  |  23  ----------------------------<  83  4.4   |  26  |  1.38<H>    Ca    9.0      13 Apr 2021 05:36  Ca    9.0      12 Apr 2021 07:36  Phos  4.2     04-13  Phos  3.9     04-12  Mg     2.2     04-13  Mg     2.1     04-12

## 2021-04-13 NOTE — DISCHARGE NOTE PROVIDER - HOSPITAL COURSE
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  During that hospitalization he was noted with 18 beats NSVT and underwent a NST on 4/1 which revealed mild anterolateral ischemia and TID.  He was not a candidate for Trx for LHC given COVID+ status so he started on medical management.  He was discharged home on 4/2 and presented today to the ER with SOB and dizziness since last night.     --Pt with indeterminate Trop T but no acute ischemic EKG changes  --Cardio chart note from 4/2* noted  --repeat TTE noted above with normal LV function   --all risks, benefits, indications, contraindications, inferior alternative options of cardiac cath explained to the patient.  He understood everything and elected for cardiac cath.  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  During that hospitalization he was noted with 18 beats NSVT and underwent a NST on 4/1 which revealed mild anterolateral ischemia and TID.  He was not a candidate for Trx for LHC given COVID+ status so he started on medical management.  He was discharged home on 4/2 and presented today to the ER with SOB and dizziness since last night.     --Pt with indeterminate Trop T but no acute ischemic EKG changes  --Cardio chart note from 4/2* noted  --repeat TTE noted above with normal LV function   --all risks, benefits, indications, contraindications, inferior alternative options of cardiac cath explained to the patient.  He understood everything and elected for cardiac cath.  In addition, spoke with renal; optimized from renal perspective for cath today   --given above, cardiac cath performed demonstrating small vessel disease in the mid RCA - medical management recommended      4/12 C: LM normal. LAD normal. Cx normal. mRCA 70% small vessel, medical management. RRA access. Resumed Xarelto.     COVID-19.    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  During that hospitalization he was noted with 18 beats NSVT and underwent a NST on 4/1 which revealed mild anterolateral ischemia and TID.  He was not a candidate for Trx for LHC given COVID+ status so he started on medical management.  He was discharged home on 4/2 and presented today to the ER with SOB and dizziness since last night.     Lightheadedness  Etiology unclear. Patient had recent abnormal stress. Also possible this may be arrhythmia related  4/12 LHC: LM normal. LAD normal. Cx normal. mRCA 70% small vessel, medical management. RRA access. Resumed Xarelto.     COVID-19.    Completed course Decadron and Remdesivir last admission   COVID-19 repeat on 4/11 positive     Atrial fibrillation, unspecified type.   Continue Metoprolol and Xarelto  Please take your medications as prescribed.    Continue to take your blood thinner as prescribed and follow with your physician to monitor your levels.    Low fat diet, reduce caffeine intake, and exercise at least 30 minutes daily    SVT (supraventricular tachycardia)  Continue Amiodarone    4/13: Case discussed with Dr. Flowers and Brandan. Patient is stable for discharge. Medications reviewed and sent to agreed upon pharmacy. 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  During that hospitalization he was noted with 18 beats NSVT and underwent a NST on 4/1 which revealed mild anterolateral ischemia and TID.  He was not a candidate for Trx for LHC given COVID+ status so he started on medical management.  He was discharged home on 4/2 and presented today to the ER with SOB and dizziness since last night.     Lightheadedness  Etiology unclear. Patient had recent abnormal stress. Also possible this may be arrhythmia related  4/12 LHC: LM normal. LAD normal. Cx normal. mRCA 70% small vessel, medical management. RRA access. Resumed Xarelto.     COVID-19.    Completed course Decadron and Remdesivir last admission   COVID-19 repeat on 4/11 positive     Atrial fibrillation, unspecified type.   Continue Metoprolol and Xarelto  Please take your medications as prescribed.    Continue to take your blood thinner as prescribed and follow with your physician to monitor your levels.    Low fat diet, reduce caffeine intake, and exercise at least 30 minutes daily    Possible VT evaluation:   as per EP: short term amiodarone 200 daily for 30 days   Follow up with EP outpatient    SVT (supraventricular tachycardia)  Continue Amiodarone    4/13: Case discussed with Dr. Flowers and Brandan. Patient is stable for discharge. Medications reviewed and sent to agreed upon pharmacy. 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  During that hospitalization he was noted with 18 beats NSVT and underwent a NST on 4/1 which revealed mild anterolateral ischemia and TID.  He was not a candidate for Trx for LHC given COVID+ status so he started on medical management.  He was discharged home on 4/2 and presented today to the ER with SOB and dizziness since last night.     Lightheadedness  Etiology unclear. Patient had recent abnormal stress. Also possible this may be arrhythmia related  4/12 LHC: LM normal. LAD normal. Cx normal. mRCA 70% small vessel, medical management. RRA access. Resumed Xarelto.     COVID-19.    Completed course Decadron and Remdesivir last admission   COVID-19 repeat on 4/11 positive     Atrial fibrillation, unspecified type.   Continue Metoprolol and Xarelto  Please take your medications as prescribed.    Continue to take your blood thinner as prescribed and follow with your physician to monitor your levels.    Low fat diet, reduce caffeine intake, and exercise at least 30 minutes daily    SVT (supraventricular tachycardia)  Continue Amiodarone  as per EP: short term amiodarone 200 daily for 30 days   Follow up with EP outpatient    4/13: Case discussed with Dr. Flowers and Brandan. Patient is stable for discharge. Medications reviewed and sent to agreed upon pharmacy.

## 2021-04-13 NOTE — DISCHARGE NOTE PROVIDER - CARE PROVIDERS DIRECT ADDRESSES
,DirectAddress_Unknown ,DirectAddress_Unknown,[email or web address]

## 2021-04-13 NOTE — PROGRESS NOTE ADULT - SUBJECTIVE AND OBJECTIVE BOX
NEPHROLOGY-NSN [phone]        Patient seen and examined in bed.  He was in good spirits         MEDICATIONS  (STANDING):  aMIOdarone    Tablet 200 milliGRAM(s) Oral daily  ascorbic acid 500 milliGRAM(s) Oral daily  aspirin  chewable 81 milliGRAM(s) Oral daily  atorvastatin 20 milliGRAM(s) Oral at bedtime  cholecalciferol 2000 Unit(s) Oral daily  famotidine    Tablet 20 milliGRAM(s) Oral daily  heparin  Infusion. 1200 Unit(s)/Hr (12 mL/Hr) IV Continuous <Continuous>  losartan 25 milliGRAM(s) Oral daily  metoprolol tartrate 50 milliGRAM(s) Oral two times a day  multivitamin 1 Tablet(s) Oral daily      VITAL:  T(C): , Max: 36.4 (04-12-21 @ 12:46)  T(F): , Max: 97.6 (04-12-21 @ 12:46)  HR: 50 (04-13-21 @ 05:26)  BP: 132/60 (04-13-21 @ 05:26)  BP(mean): --  RR: 18 (04-13-21 @ 05:26)  SpO2: 96% (04-13-21 @ 05:26)  Wt(kg): --    I and O's:        PHYSICAL EXAM:    Constitutional: NAD  Neck:  No JVD  Respiratory: CTAB/L  Cardiovascular: S1 and S2  Gastrointestinal: BS+, soft, NT/ND  Extremities: +  peripheral edema  Neurological: A/O x 3, no focal deficits  Psychiatric: Normal mood, normal affect  : No Moeller  Skin: No rashes  Access: Not applicable    LABS:                        11.5   6.98  )-----------( 171      ( 13 Apr 2021 05:36 )             36.5     04-13    137  |  103  |  20  ----------------------------<  94  4.1   |  25  |  1.17    Ca    9.0      13 Apr 2021 05:36  Phos  4.2     04-13  Mg     2.2     04-13            Urine Studies:          RADIOLOGY & ADDITIONAL STUDIES:

## 2021-04-13 NOTE — DISCHARGE NOTE PROVIDER - NSDCCPCAREPLAN_GEN_ALL_CORE_FT
PRINCIPAL DISCHARGE DIAGNOSIS  Diagnosis: Lightheadedness  Assessment and Plan of Treatment: Symptoms resolved. Status post CATH. Resumed Xarelto.      SECONDARY DISCHARGE DIAGNOSES  Diagnosis: COVID-19  Assessment and Plan of Treatment: Completed course Decadron and Remdesivir last admission   COVID-19 repeat on 4/11 positive    Diagnosis: SVT (supraventricular tachycardia)  Assessment and Plan of Treatment: Continue Amiodarone    Diagnosis: Atrial fibrillation, unspecified type  Assessment and Plan of Treatment: Continue Metoprolol and Xarelto  Please take your medications as prescribed.    Continue to take your blood thinner as prescribed and follow with your physician to monitor your levels.    Low fat diet, reduce caffeine intake, and exercise at least 30 minutes daily

## 2021-04-13 NOTE — PROGRESS NOTE ADULT - SUBJECTIVE AND OBJECTIVE BOX
Patient is a 70y old  Male who presents with a chief complaint of shortness of breath (13 Apr 2021 15:09)    Date of servie : 04-13-21 @ 15:40  INTERVAL HPI/OVERNIGHT EVENTS:  T(C): 36.5 (04-13-21 @ 12:53), Max: 36.5 (04-13-21 @ 12:53)  HR: 63 (04-13-21 @ 12:53) (50 - 63)  BP: 118/60 (04-13-21 @ 12:53) (118/60 - 144/68)  RR: 18 (04-13-21 @ 12:53) (16 - 18)  SpO2: 97% (04-13-21 @ 12:53) (96% - 99%)  Wt(kg): --  I&O's Summary      LABS:                        11.5   6.98  )-----------( 171      ( 13 Apr 2021 05:36 )             36.5     04-13    137  |  103  |  20  ----------------------------<  94  4.1   |  25  |  1.17    Ca    9.0      13 Apr 2021 05:36  Phos  4.2     04-13  Mg     2.2     04-13      PT/INR - ( 12 Apr 2021 07:36 )   PT: 14.0 sec;   INR: 1.23 ratio         PTT - ( 13 Apr 2021 05:36 )  PTT:55.0 sec    CAPILLARY BLOOD GLUCOSE                MEDICATIONS  (STANDING):  aMIOdarone    Tablet 200 milliGRAM(s) Oral daily  ascorbic acid 500 milliGRAM(s) Oral daily  aspirin  chewable 81 milliGRAM(s) Oral daily  atorvastatin 20 milliGRAM(s) Oral at bedtime  cholecalciferol 2000 Unit(s) Oral daily  famotidine    Tablet 20 milliGRAM(s) Oral daily  losartan 25 milliGRAM(s) Oral daily  metoprolol tartrate 50 milliGRAM(s) Oral two times a day  multivitamin 1 Tablet(s) Oral daily  rivaroxaban 20 milliGRAM(s) Oral with dinner    MEDICATIONS  (PRN):  ondansetron Injectable 4 milliGRAM(s) IV Push every 6 hours PRN Nausea          PHYSICAL EXAM:  GENERAL: NAD, well-groomed, well-developed  HEAD:  Atraumatic, Normocephalic  CHEST/LUNG: Clear to percussion bilaterally; No rales, rhonchi, wheezing, or rubs  HEART: Regular rate and rhythm; No murmurs, rubs, or gallops  ABDOMEN: Soft, Nontender, Nondistended; Bowel sounds present  EXTREMITIES:  2+ Peripheral Pulses, No clubbing, cyanosis, or edema  LYMPH: No lymphadenopathy noted  SKIN: No rashes or lesions    Care Discussed with Consultants/Other Providers [ ] YES  [ ] NO

## 2021-04-13 NOTE — PROGRESS NOTE ADULT - ASSESSMENT
Problem/Plan - 1:  ·  Problem: Lightheadedness.  Plan: - Etiology unclear. Pt had recent abnormal stress. Also possible this may be arrhythmia related  - Trops flat  - Monitor on tele   - management as per cards  - sp cath    Problem/Plan - 2:  ·  Problem: Abnormal stress test.  Plan: - Pt declined cardiac cath last admission but amenable now   - Monitor on tele   - Cardiology consult appreciated  - sp cath     Problem/Plan - 3:  ·  Problem: COVID-19.  Plan: - Completed course Decadron and Remdesivir  - Symptoms improved. No longer hypoxic- Will continue to monitor. Supportive care.     Problem/Plan - 4:  ·  Problem: Atrial fibrillation, unspecified type.  Plan: - Continue Metoprolol and Xarelto.     Problem/Plan - 5:  ·  Problem: SVT (supraventricular tachycardia).  Plan: - Continue Amiodarone.

## 2021-04-13 NOTE — PROGRESS NOTE ADULT - NSICDXPILOT_GEN_ALL_CORE
[place]
TRANSFER - OUT REPORT:    Verbal report given to Angélica Horowitz RN (name) on Neelima Moore  being transferred to room 342 (unit) for routine post - op       Report consisted of patients Situation, Background, Assessment and   Recommendations(SBAR). Information from the following report(s) SBAR, Kardex, OR Summary, Intake/Output and MAR was reviewed with the receiving nurse. Lines:   Peripheral IV 08/14/18 Left Arm (Active)   Site Assessment Clean, dry, & intact 8/14/2018  5:01 PM   Phlebitis Assessment 0 8/14/2018  5:01 PM   Infiltration Assessment 0 8/14/2018  5:01 PM   Dressing Status Clean, dry, & intact 8/14/2018  5:01 PM   Dressing Type Tape;Transparent 8/14/2018  5:01 PM   Hub Color/Line Status Infusing;Patent 8/14/2018  5:01 PM        Opportunity for questions and clarification was provided.       Patient transported with:   O2 @ 2 liters  Registered Nurse
[place]
[facility]
[place]

## 2021-04-13 NOTE — DISCHARGE NOTE NURSING/CASE MANAGEMENT/SOCIAL WORK - PATIENT PORTAL LINK FT
You can access the FollowMyHealth Patient Portal offered by [facility] by registering at the following website: [email or web address]. By joining [employer]’s FollowMyHealth portal, you will also be able to view your health information using other applications (apps) compatible with our system.

## 2021-04-21 ENCOUNTER — APPOINTMENT (OUTPATIENT)
Dept: PULMONOLOGY | Facility: CLINIC | Age: 71
End: 2021-04-21
Payer: MEDICARE

## 2021-04-21 VITALS
HEART RATE: 60 BPM | BODY MASS INDEX: 47.74 KG/M2 | HEIGHT: 68 IN | SYSTOLIC BLOOD PRESSURE: 136 MMHG | RESPIRATION RATE: 15 BRPM | OXYGEN SATURATION: 96 % | DIASTOLIC BLOOD PRESSURE: 80 MMHG | TEMPERATURE: 98.1 F | WEIGHT: 315 LBS

## 2021-04-21 DIAGNOSIS — F17.200 NICOTINE DEPENDENCE, UNSPECIFIED, UNCOMPLICATED: ICD-10-CM

## 2021-04-21 DIAGNOSIS — Z82.5 FAMILY HISTORY OF ASTHMA AND OTHER CHRONIC LOWER RESPIRATORY DISEASES: ICD-10-CM

## 2021-04-21 DIAGNOSIS — Z86.79 PERSONAL HISTORY OF OTHER DISEASES OF THE CIRCULATORY SYSTEM: ICD-10-CM

## 2021-04-21 DIAGNOSIS — R06.83 SNORING: ICD-10-CM

## 2021-04-21 DIAGNOSIS — Z86.39 PERSONAL HISTORY OF OTHER ENDOCRINE, NUTRITIONAL AND METABOLIC DISEASE: ICD-10-CM

## 2021-04-21 PROCEDURE — 99072 ADDL SUPL MATRL&STAF TM PHE: CPT

## 2021-04-21 PROCEDURE — 99205 OFFICE O/P NEW HI 60 MIN: CPT

## 2021-04-21 NOTE — ASSESSMENT
[FreeTextEntry1] : 71 y/o M with PMH of CAD, PAF and HLD presented to the sleep clinic for an initial evaluation. He is accompanied by his wife. His main complaint is snoring and non-restorative sleep. He notes frequent nocturnal awakenings and dry mouth in the AM.  Snores and has pauses to breathing.  Has EDS with an ESS of 7 on today's encounter.  Symptoms have been ongoing for many years and he is now addressing them.  Recently, hospitalized with COVID-19 pneumonia, (+) stress test and LHC showed 70% mRCA stenosis. He has comorbid cardiac disease including a h/o CAD, atrial fibrillation as well as a sensation snoring pauses to breathing and nonrestorative sleep.  His BMI is 51.39 kg/m2. 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   His cardiac history and pauses to breathing at night increases his risk for CSA.  Assessment and treatment of PURVI/CSA is important for management of somnolence and nonrestorative sleep as well as for cardiovascular risk reduction.  The patient was referred to the [facility] for diagnostic polysomnography with transcutaneous CO2 monitoring for further assessment. The ramifications of obstructive sleep apnea and its potential therapeutic modalities were discussed with the patient. The dangers of drowsy driving were discussed with the patient.  The patient was warned to avoid drowsy driving. The patient will followup after results of this study are available.\par  \par Jorge Hopper, DO\par Pulmonary, Critical Care and Sleep Medicine Attending

## 2021-04-21 NOTE — HISTORY OF PRESENT ILLNESS
[Snoring] : snoring [Witnessed Apneas] : witnessed sleep apnea [Frequent Nocturnal Awakening] : frequent nocturnal awakening [Unintentional Sleep While Inactive] : unintentional sleep while inactive [Awakes Unrefreshed] : awakening unrefreshed [Awakening With Dry Mouth] : awakening with dry mouth [Daytime Somnolence] : daytime somnolence [FreeTextEntry1] : 69 y/o M with PMH of presented to the sleep clinic for an initial evaluation.\par \par His main complaint is snoring and non-restorative sleep. He notes frequent nocturnal awakenings and dry mouth in the AM.  Snores and has pauses to breathing.  Has EDS with an ESS of 7 on today's encounter.  Symptoms have been ongoing for many years and he is now addressing them.\par \par Recent admissions:\par 3/24-4/2: COVID-19 pneumonia (s/p R&D) and was noted to SVT/[place]. fib for which he was started on amio/xarelto.  In addition, had a nuclear stress which was postive for ischemia.  No LHC b/c he was still COVID-19 (+).\par \par 4/7-4/13: Presented to [facility] with lightheadedness, underwent LHC which showed 70% stenosis of the mRCA which was managed medically.\par \par ECHO (3/26): EF: 50-55% with a PASP of 51.6 mmHg.\par \par ____________________________________________________________________\par EPWORTH SLEEPINESS SCALE\par \par How likely are you to doze off or fall asleep in the situations described below, in contrast to feeling just tired?  \par This refers to your usual way of life in recent times.  \par Even if you haven't done some of these things recently, try to work out how they would have affected you.\par Use the following scale to choose one most appropriate number for each situation.\par \par Chance of dozing.............Situation\par ..............2..........................Sitting and reading\par ..............3..........................Watching TV\par ..............0..........................Sitting inactive in a public place (eg a theatre or a meeting)\par ..............0..........................As a passenger in a car for an hour without a break\par ..............1..........................Lying down to rest in the afternoon when circumstances permit\par ..............1..........................Sitting and talking to someone\par ..............0..........................Sitting quietly after lunch without alcohol\par ..............1..........................In a car, while stopped for a few minutes in traffic\par \par ..............8..........................TOTAL SCORE\par \par 0 = Never would doze\par 1 = Slight chance of dozing\par 2 = Moderate chance of dozing\par 3 = High chance of dozing \par ______________________________________________________________________\par  [Unintentional Sleep while Active] : no unintentional sleep while active [Awakes with Headache] : no headache upon awakening [ESS] : 8

## 2021-04-28 DIAGNOSIS — U07.1 COVID-19: ICD-10-CM

## 2021-04-28 DIAGNOSIS — I47.1 SUPRAVENTRICULAR TACHYCARDIA: ICD-10-CM

## 2021-04-28 DIAGNOSIS — J18.9 PNEUMONIA, UNSPECIFIED ORGANISM: ICD-10-CM

## 2021-04-28 DIAGNOSIS — Z87.898 PERSONAL HISTORY OF OTHER SPECIFIED CONDITIONS: ICD-10-CM

## 2021-04-28 RX ORDER — LOSARTAN POTASSIUM 25 MG/1
25 TABLET, FILM COATED ORAL
Refills: 0 | Status: DISCONTINUED | COMMUNITY
End: 2021-04-28

## 2021-04-30 ENCOUNTER — APPOINTMENT (OUTPATIENT)
Dept: ELECTROPHYSIOLOGY | Facility: CLINIC | Age: 71
End: 2021-04-30
Payer: MEDICARE

## 2021-04-30 ENCOUNTER — NON-APPOINTMENT (OUTPATIENT)
Age: 71
End: 2021-04-30

## 2021-04-30 VITALS
HEIGHT: 68 IN | BODY MASS INDEX: 51.85 KG/M2 | HEART RATE: 54 BPM | DIASTOLIC BLOOD PRESSURE: 94 MMHG | OXYGEN SATURATION: 96 % | TEMPERATURE: 96.9 F | SYSTOLIC BLOOD PRESSURE: 146 MMHG

## 2021-04-30 VITALS — WEIGHT: 315 LBS | BODY MASS INDEX: 51.85 KG/M2

## 2021-04-30 DIAGNOSIS — I47.2 VENTRICULAR TACHYCARDIA: ICD-10-CM

## 2021-04-30 PROCEDURE — 99213 OFFICE O/P EST LOW 20 MIN: CPT

## 2021-04-30 PROCEDURE — 93000 ELECTROCARDIOGRAM COMPLETE: CPT

## 2021-04-30 RX ORDER — AMIODARONE HYDROCHLORIDE 200 MG/1
200 TABLET ORAL DAILY
Refills: 0 | Status: DISCONTINUED | COMMUNITY
End: 2021-04-30

## 2021-04-30 NOTE — DISCUSSION/SUMMARY
[FreeTextEntry1] : Eder Cruz is a 71y/o man with Hx of HTN, HLD, GERD, obesity, sinus node dysfunction s/p dual chamber PPM placement, and recent hospitalization for COVID+ PNA and newly found atrial fibrillation, now on Amiodarone and Xarelto, who presents today for routine f/u. \par \par Impression:\par \par 1. Paroxysmal afib: EKG performed today to assess for presence of afib and reveals NSR. Quick check of PPM reveals no evidence of recurrent afib post hospitalization. Remains on Amiodarone 200mg daily. Given adverse effect profile and afib in setting of COVID, recommend discontinuing Amiodarone at this time. Will Resume Xarelto for thromboembolic prophylaxis. If remains free of afib, consider discontinuing in future. If afib recurs/symptomatic afib, consider further treatment options for afib such as possible ablation in future. \par \par 2. NSVT: recent hospitalization for COVID + PNA and noted NSVT. s/p LHC with non obstructive CAD. On metoprolol 50mg BID. No recurrent VT/NSVT noted on PPM. Resume beta blockers as prescribed. \par \par 3. HTN: resume oral antihypertensives as prescribed. Encouraged heart healthy diet, sodium restriction, and weight loss. Continue regular f/u with Cardiologist for further HTN management.\par \par 4. HLD: resume statin therapy as prescribed and regular f/u with Cardiologist for routine lipid monitoring and management.\par \par 5. Sinus node dysfunction: s/p dual chamber PPM placement. Device checked quickly in office and reveals good battery life and no events for review. Resume routine device checks with Cardiologist. \par \par Sincerely,\par \par Flavio Gonzalez MD

## 2021-04-30 NOTE — HISTORY OF PRESENT ILLNESS
[FreeTextEntry1] : Eder Cruz is a 71y/o man with Hx of HTN, HLD, GERD, obesity, sinus node dysfunction s/p dual chamber PPM placement, and recent hospitalization for COVID+ PNA and newly found atrial fibrillation, now on Amiodarone and Xarelto, who presents today for routine f/u. Back in 4/2021, he was admitted to [facility] with COVID PNA and new onset afib. During that hospitalization he was noted with 18 beats NSVT and underwent a NST on 4/1 which revealed mild anterolateral ischemia and TID.  He was not a candidate for Trx for [place] given COVID+ status so he started on medical management. Initiated on Amiodarone for afib suppression and Xarelto. Since that time, he has been doing well. Some lingering fatigue. Denies chest pain, palpitations, SOB, syncope or near syncope.\par

## 2021-04-30 NOTE — CARDIOLOGY SUMMARY
[de-identified] : 4/8/2021: CONCLUSIONS:\par Technically difficult study.\par 1. Mitral annular calcification, otherwise normal mitral\par valve. Minimal mitral regurgitation.\par 2. Aortic valve leaflet morphology not well visualized.\par Mild aortic regurgitation.\par 3. Endocardium not well visualized; grossly normal left\par ventricular systolic function.  Endocardial visualization\par enhanced with intravenous injection of echo contrast\par (Definity).\par 4. Unable to accurately evaluate right ventricular size or\par systolic function.  A device wire is noted in the right\par heart. [de-identified] : 4/12/2021 LHC: LM normal. LAD normal. Cx normal. mRCA 70% small vessel, medical \par management. RRA access. Resumed Xarelto. \par CORONARY VESSELS: The coronary circulation is left dominant.\par LM:   --  LM: Normal.\par LAD:   --  LAD: Angiography showed minor luminal irregularities with no\par flow limiting lesions.\par --  D1: Angiography showed minor luminal irregularities with no flow\par limiting lesions.\par CX:   --  Circumflex: Normal.\par --  OM1: Normal.\par --  OM2: Normal.\par RCA:   --  Proximal RCA: Angiography showed minor luminal irregularities\par with no flow limiting lesions.\par --  Mid RCA: There was a diffuse 70 % stenosis.\par --  Distal RCA: Angiography showed minor luminal irregularities with no\par flow limiting lesions.\par COMPLICATIONS: There were no complications. No complications occurred\par during the cath lab visit.\par DIAGNOSTIC RECOMMENDATIONS: Coronary angiogram demonstrates atherosclerosis\par in very small sized RCA. Will provide medical management of small vessel\par CAD at this time.

## 2021-05-25 ENCOUNTER — APPOINTMENT (OUTPATIENT)
Dept: ULTRASOUND IMAGING | Facility: CLINIC | Age: 71
End: 2021-05-25

## 2021-06-03 PROCEDURE — A9500: CPT

## 2021-06-03 PROCEDURE — 84484 ASSAY OF TROPONIN QUANT: CPT

## 2021-06-03 PROCEDURE — 36415 COLL VENOUS BLD VENIPUNCTURE: CPT

## 2021-06-03 PROCEDURE — 82728 ASSAY OF FERRITIN: CPT

## 2021-06-03 PROCEDURE — 82962 GLUCOSE BLOOD TEST: CPT

## 2021-06-03 PROCEDURE — 84145 PROCALCITONIN (PCT): CPT

## 2021-06-03 PROCEDURE — 96374 THER/PROPH/DIAG INJ IV PUSH: CPT

## 2021-06-03 PROCEDURE — 99285 EMERGENCY DEPT VISIT HI MDM: CPT | Mod: 25

## 2021-06-03 PROCEDURE — 86803 HEPATITIS C AB TEST: CPT

## 2021-06-03 PROCEDURE — 85025 COMPLETE CBC W/AUTO DIFF WBC: CPT

## 2021-06-03 PROCEDURE — 78452 HT MUSCLE IMAGE SPECT MULT: CPT

## 2021-06-03 PROCEDURE — 93306 TTE W/DOPPLER COMPLETE: CPT

## 2021-06-03 PROCEDURE — 83735 ASSAY OF MAGNESIUM: CPT

## 2021-06-03 PROCEDURE — U0003: CPT

## 2021-06-03 PROCEDURE — 80048 BASIC METABOLIC PNL TOTAL CA: CPT

## 2021-06-03 PROCEDURE — 86140 C-REACTIVE PROTEIN: CPT

## 2021-06-03 PROCEDURE — 93017 CV STRESS TEST TRACING ONLY: CPT

## 2021-06-03 PROCEDURE — 93005 ELECTROCARDIOGRAM TRACING: CPT

## 2021-06-03 PROCEDURE — 85379 FIBRIN DEGRADATION QUANT: CPT

## 2021-06-03 PROCEDURE — 86769 SARS-COV-2 COVID-19 ANTIBODY: CPT

## 2021-06-03 PROCEDURE — U0005: CPT

## 2021-06-03 PROCEDURE — 82565 ASSAY OF CREATININE: CPT

## 2021-06-03 PROCEDURE — 80053 COMPREHEN METABOLIC PANEL: CPT

## 2021-06-03 PROCEDURE — 80076 HEPATIC FUNCTION PANEL: CPT

## 2021-06-03 PROCEDURE — 85027 COMPLETE CBC AUTOMATED: CPT

## 2021-06-03 PROCEDURE — 83036 HEMOGLOBIN GLYCOSYLATED A1C: CPT

## 2021-06-03 PROCEDURE — 83615 LACTATE (LD) (LDH) ENZYME: CPT

## 2021-06-03 PROCEDURE — 84100 ASSAY OF PHOSPHORUS: CPT

## 2021-06-03 PROCEDURE — 71045 X-RAY EXAM CHEST 1 VIEW: CPT

## 2021-06-03 PROCEDURE — 96375 TX/PRO/DX INJ NEW DRUG ADDON: CPT

## 2021-06-03 PROCEDURE — 85610 PROTHROMBIN TIME: CPT

## 2021-06-03 PROCEDURE — 87635 SARS-COV-2 COVID-19 AMP PRB: CPT

## 2021-06-03 PROCEDURE — 85730 THROMBOPLASTIN TIME PARTIAL: CPT

## 2021-06-03 PROCEDURE — 83880 ASSAY OF NATRIURETIC PEPTIDE: CPT

## 2021-06-24 ENCOUNTER — OUTPATIENT (OUTPATIENT)
Dept: OUTPATIENT SERVICES | Facility: HOSPITAL | Age: 71
LOS: 1 days | End: 2021-06-24
Payer: MEDICARE

## 2021-06-24 ENCOUNTER — APPOINTMENT (OUTPATIENT)
Dept: SLEEP CENTER | Facility: CLINIC | Age: 71
End: 2021-06-24
Payer: MEDICARE

## 2021-06-24 DIAGNOSIS — Z95.0 PRESENCE OF CARDIAC PACEMAKER: Chronic | ICD-10-CM

## 2021-06-24 PROCEDURE — 95810 POLYSOM 6/> YRS 4/> PARAM: CPT | Mod: 26

## 2021-06-24 PROCEDURE — 95810 POLYSOM 6/> YRS 4/> PARAM: CPT

## 2021-06-25 DIAGNOSIS — G47.33 OBSTRUCTIVE SLEEP APNEA (ADULT) (PEDIATRIC): ICD-10-CM

## 2021-07-13 ENCOUNTER — NON-APPOINTMENT (OUTPATIENT)
Age: 71
End: 2021-07-13

## 2021-07-13 DIAGNOSIS — E66.01 MORBID (SEVERE) OBESITY DUE TO EXCESS CALORIES: ICD-10-CM

## 2021-07-14 PROBLEM — E66.01 MORBID OBESITY WITH BMI OF 50.0-59.9, ADULT: Status: ACTIVE | Noted: 2021-07-14

## 2021-09-01 ENCOUNTER — FORM ENCOUNTER (OUTPATIENT)
Age: 71
End: 2021-09-01

## 2021-09-05 ENCOUNTER — APPOINTMENT (OUTPATIENT)
Dept: SLEEP CENTER | Facility: CLINIC | Age: 71
End: 2021-09-05
Payer: MEDICARE

## 2021-09-05 ENCOUNTER — OUTPATIENT (OUTPATIENT)
Dept: OUTPATIENT SERVICES | Facility: HOSPITAL | Age: 71
LOS: 1 days | End: 2021-09-05
Payer: MEDICARE

## 2021-09-05 DIAGNOSIS — Z95.0 PRESENCE OF CARDIAC PACEMAKER: Chronic | ICD-10-CM

## 2021-09-05 PROCEDURE — 95811 POLYSOM 6/>YRS CPAP 4/> PARM: CPT

## 2021-09-05 PROCEDURE — 95811 POLYSOM 6/>YRS CPAP 4/> PARM: CPT | Mod: 26

## 2021-09-07 DIAGNOSIS — G47.33 OBSTRUCTIVE SLEEP APNEA (ADULT) (PEDIATRIC): ICD-10-CM

## 2021-09-24 ENCOUNTER — NON-APPOINTMENT (OUTPATIENT)
Age: 71
End: 2021-09-24

## 2023-01-03 NOTE — ED ADULT NURSE NOTE - IS THE PATIENT ABLE TO BE SCREENED?
For information on Fall & Injury Prevention, visit: [email or web address] OR  [email or web address] OR  https://www.cdc.gov/steadi/patient.html Yes

## 2023-01-23 NOTE — PHYSICAL THERAPY INITIAL EVALUATION ADULT - DIAGNOSIS, PT EVAL
Increased fluid intake.  Start antibiotic today, take until completion.  Ibuprofen or Tylenol for sinus pain/fever as needed.  Delsym with guaifenesin or Mucinex DM as needed for cough and congestion.  Seek further medical care for any further significant problems.  
[place]

## 2023-06-13 ENCOUNTER — APPOINTMENT (OUTPATIENT)
Dept: ELECTROPHYSIOLOGY | Facility: CLINIC | Age: 73
End: 2023-06-13
Payer: MEDICARE

## 2023-06-13 ENCOUNTER — NON-APPOINTMENT (OUTPATIENT)
Age: 73
End: 2023-06-13

## 2023-06-13 VITALS
HEIGHT: 68 IN | BODY MASS INDEX: 31.52 KG/M2 | WEIGHT: 208 LBS | HEART RATE: 64 BPM | DIASTOLIC BLOOD PRESSURE: 71 MMHG | OXYGEN SATURATION: 97 % | SYSTOLIC BLOOD PRESSURE: 137 MMHG

## 2023-06-13 PROCEDURE — 93000 ELECTROCARDIOGRAM COMPLETE: CPT

## 2023-06-13 PROCEDURE — 99214 OFFICE O/P EST MOD 30 MIN: CPT

## 2023-06-13 RX ORDER — METOPROLOL TARTRATE 50 MG/1
50 TABLET, FILM COATED ORAL
Qty: 180 | Refills: 1 | Status: DISCONTINUED | COMMUNITY
End: 2023-06-13

## 2023-06-13 RX ORDER — APIXABAN 5 MG/1
5 TABLET, FILM COATED ORAL
Qty: 90 | Refills: 1 | Status: ACTIVE | COMMUNITY
Start: 2023-06-13

## 2023-06-13 RX ORDER — CA/D3/MAG OX/ZINC/COP/MANG/BOR 600 MG-800
250 MCG TABLET,CHEWABLE ORAL
Refills: 0 | Status: DISCONTINUED | COMMUNITY
End: 2023-06-13

## 2023-06-13 RX ORDER — MULTIVITAMIN
TABLET ORAL
Refills: 0 | Status: DISCONTINUED | COMMUNITY
End: 2023-06-13

## 2023-06-13 RX ORDER — LOSARTAN POTASSIUM 25 MG/1
25 TABLET, FILM COATED ORAL DAILY
Qty: 90 | Refills: 1 | Status: DISCONTINUED | COMMUNITY
End: 2023-06-13

## 2023-06-13 RX ORDER — FAMOTIDINE 20 MG/1
20 TABLET, FILM COATED ORAL
Refills: 0 | Status: DISCONTINUED | COMMUNITY
End: 2023-06-13

## 2023-06-13 RX ORDER — ATORVASTATIN CALCIUM 20 MG/1
20 TABLET, FILM COATED ORAL
Qty: 90 | Refills: 1 | Status: DISCONTINUED | COMMUNITY
End: 2023-06-13

## 2023-06-13 RX ORDER — ASPIRIN 81 MG/1
81 TABLET, CHEWABLE ORAL
Refills: 0 | Status: DISCONTINUED | COMMUNITY
End: 2023-06-13

## 2023-06-13 RX ORDER — RIVAROXABAN 20 MG/1
20 TABLET, FILM COATED ORAL
Qty: 90 | Refills: 1 | Status: DISCONTINUED | COMMUNITY
End: 2023-06-13

## 2023-06-13 NOTE — PHYSICAL EXAM
[Well Developed] : well developed [Well Nourished] : well nourished [No Acute Distress] : no acute distress [Normal Conjunctiva] : normal conjunctiva [Normal Venous Pressure] : normal venous pressure [No Carotid Bruit] : no carotid bruit [Normal S1, S2] : normal S1, S2 [No Murmur] : no murmur [No Rub] : no rub [No Gallop] : no gallop [Clear Lung Fields] : clear lung fields [Good Air Entry] : good air entry [No Respiratory Distress] : no respiratory distress  [Soft] : abdomen soft [Non Tender] : non-tender [No Masses/organomegaly] : no masses/organomegaly [Normal Bowel Sounds] : normal bowel sounds [Normal Gait] : normal gait [No Edema] : no edema [No Cyanosis] : no cyanosis [No Clubbing] : no clubbing [No Varicosities] : no varicosities [No Rash] : no rash [No Skin Lesions] : no skin lesions [Moves all extremities] : moves all extremities [No Focal Deficits] : no focal deficits [Normal Speech] : normal speech [Alert and Oriented] : alert and oriented [Normal memory] : normal memory [Normal] : normal S1, S2, no murmur, no rub, no gallop

## 2023-07-25 ENCOUNTER — OUTPATIENT (OUTPATIENT)
Dept: OUTPATIENT SERVICES | Facility: HOSPITAL | Age: 73
LOS: 1 days | End: 2023-07-25

## 2023-07-25 VITALS
TEMPERATURE: 98 F | SYSTOLIC BLOOD PRESSURE: 138 MMHG | RESPIRATION RATE: 16 BRPM | WEIGHT: 214.07 LBS | HEIGHT: 68 IN | HEART RATE: 77 BPM | DIASTOLIC BLOOD PRESSURE: 86 MMHG | OXYGEN SATURATION: 99 %

## 2023-07-25 DIAGNOSIS — Z95.0 PRESENCE OF CARDIAC PACEMAKER: ICD-10-CM

## 2023-07-25 DIAGNOSIS — I49.5 SICK SINUS SYNDROME: ICD-10-CM

## 2023-07-25 DIAGNOSIS — I48.91 UNSPECIFIED ATRIAL FIBRILLATION: ICD-10-CM

## 2023-07-25 DIAGNOSIS — Z95.0 PRESENCE OF CARDIAC PACEMAKER: Chronic | ICD-10-CM

## 2023-07-25 LAB
ANION GAP SERPL CALC-SCNC: 5 MMOL/L — LOW (ref 7–14)
BLD GP AB SCN SERPL QL: NEGATIVE — SIGNIFICANT CHANGE UP
BUN SERPL-MCNC: 35 MG/DL — HIGH (ref 7–23)
CALCIUM SERPL-MCNC: 9.2 MG/DL — SIGNIFICANT CHANGE UP (ref 8.4–10.5)
CHLORIDE SERPL-SCNC: 107 MMOL/L — SIGNIFICANT CHANGE UP (ref 98–107)
CO2 SERPL-SCNC: 25 MMOL/L — SIGNIFICANT CHANGE UP (ref 22–31)
CREAT SERPL-MCNC: 1.19 MG/DL — SIGNIFICANT CHANGE UP (ref 0.5–1.3)
EGFR: 66 ML/MIN/1.73M2 — SIGNIFICANT CHANGE UP
GLUCOSE SERPL-MCNC: 86 MG/DL — SIGNIFICANT CHANGE UP (ref 70–99)
HCT VFR BLD CALC: 46.4 % — SIGNIFICANT CHANGE UP (ref 39–50)
HGB BLD-MCNC: 14.8 G/DL — SIGNIFICANT CHANGE UP (ref 13–17)
MCHC RBC-ENTMCNC: 31 PG — SIGNIFICANT CHANGE UP (ref 27–34)
MCHC RBC-ENTMCNC: 31.9 GM/DL — LOW (ref 32–36)
MCV RBC AUTO: 97.1 FL — SIGNIFICANT CHANGE UP (ref 80–100)
NRBC # BLD: 0 /100 WBCS — SIGNIFICANT CHANGE UP (ref 0–0)
NRBC # FLD: 0 K/UL — SIGNIFICANT CHANGE UP (ref 0–0)
PLATELET # BLD AUTO: 239 K/UL — SIGNIFICANT CHANGE UP (ref 150–400)
POTASSIUM SERPL-MCNC: 4.7 MMOL/L — SIGNIFICANT CHANGE UP (ref 3.5–5.3)
POTASSIUM SERPL-SCNC: 4.7 MMOL/L — SIGNIFICANT CHANGE UP (ref 3.5–5.3)
RBC # BLD: 4.78 M/UL — SIGNIFICANT CHANGE UP (ref 4.2–5.8)
RBC # FLD: 13.6 % — SIGNIFICANT CHANGE UP (ref 10.3–14.5)
RH IG SCN BLD-IMP: POSITIVE — SIGNIFICANT CHANGE UP
SODIUM SERPL-SCNC: 137 MMOL/L — SIGNIFICANT CHANGE UP (ref 135–145)
WBC # BLD: 7.83 K/UL — SIGNIFICANT CHANGE UP (ref 3.8–10.5)
WBC # FLD AUTO: 7.83 K/UL — SIGNIFICANT CHANGE UP (ref 3.8–10.5)

## 2023-07-25 NOTE — H&P PST ADULT - PROBLEM SELECTOR PLAN 1
Patient tentatively scheduled for atrial fibrillation and flutter ablation w/[employer]     Pre-op instructions provided. Pt given verbal and written instructions with teach back on chlorhexidine shampoo and Pepcid. Pt verbalized understanding with return demonstration.    Patient to obtain all medication instructions as per EP    Patient was instructed hold ELIQUIS on the morning of the surgery as per EP    Patient instructed to take Metoprolol with a sip of water on the morning of procedure as per EP

## 2023-07-25 NOTE — H&P PST ADULT - NSANTHOSAYNRD_GEN_A_CORE
lost 165lbs over 2.5 yrs, as per patient he doesn't have PURVI/No. PURVI screening performed.  STOP BANG Legend: 0-2 = LOW Risk; 3-4 = INTERMEDIATE Risk; 5-8 = HIGH Risk

## 2023-07-25 NOTE — H&P PST ADULT - HISTORY OF PRESENT ILLNESS
Patient is 72 year male with preop dx of Sick sinus syndrome. Patient is scheduled for atrial fibrillation and flutter ablation w/ [employer]

## 2023-07-25 NOTE — H&P PST ADULT - LAST CARDIAC ANGIOGRAM/IMAGING
Phone call to patient. She states that she got a \"tummy ache\" after she ate breakfast today. She states she is starting to feel better but has not eaten lunch. She is taking the pantoprazole 40 mg once a day. She is concerned due to her history of an ulcer. Please advise.   April 2021--at [facility] No stents

## 2023-07-25 NOTE — H&P PST ADULT - PROBLEM SELECTOR PLAN 2
OR booking notified OR booking notified    [employer] Model #  ADDR01    Serial Number [id number]

## 2023-08-06 NOTE — HISTORY OF PRESENT ILLNESS
[FreeTextEntry1] : Eder Cruz is a 73y/o man with Hx of HTN, HLD, GERD, obesity, sinus node dysfunction s/p dual chamber PPM (Medtronic) placement, and COVID+ PNA and  atrial fibrillation, was on Amiodarone and Xarelto, who presents today for routine f/u. Back in 4/2021, he was admitted to [facility] with COVID PNA and new onset afib. During that hospitalization he was noted with 18 beats NSVT and underwent a NST on 4/1 which revealed mild anterolateral ischemia and TID.  He was not a candidate for Trx for [facility] given COVID+ status so he started on medical management. Initiated on Amiodarone for afib suppression and Xarelto. Since that time, he has been doing well. Lost 160 pounds intentionally for 2.5 years. And came off on all his medications. Followed by cardiologist  Dr Smith. Found to be in Aflutter yesterday by Dr Smith. Echo done yesterday EF 55-60%., moderate MRMatthew  Started on Eliquis 5 mg bid yesterday. He presents today for further evaluation. Denies chest pain, palpitations, SOB, syncope or near syncope.

## 2023-08-06 NOTE — DISCUSSION/SUMMARY
[EKG obtained to assist in diagnosis and management of assessed problem(s)] : EKG obtained to assist in diagnosis and management of assessed problem(s) [FreeTextEntry1] : Impression:  1. Paroxysmal afib: EKG performed today to assess for presence of afib and reveals atrial flutter. Quick check of PPM revealed evidence of recurrent afib. On Eliquis for thromboembolic prophylaxis and tolerating well. Discussed treatment options for AFib/AFL including rate control Vs antiarrhythmias vs possible ablation. Will start on metoprolol succinate 25 mg daily for rate control. Given recurrent Afib/afl, recommend undergoing possible AFIB/AFL ablation. The rationale for the procedure as well as its risks--including but not limited to bleeding, vascular injury, pericardial effusion/tamponade, heart block requiring pacemaker, stroke, and death--were reviewed in detail. After consideration of this information, the decision was made to proceed with the procedure. Hold Eliquis the day of ablation.   2. HTN: resume oral antihypertensives as prescribed. Encouraged heart healthy diet, sodium restriction, and weight loss. Continue regular f/u with Cardiologist for further HTN management.  3. HLD: resume statin therapy as prescribed and regular f/u with Cardiologist for routine lipid monitoring and management.  5. Sinus node dysfunction: s/p dual chamber PPM placement. Device checked quickly in office and reveals good battery life and recurrent AFIB/AFL. Resume routine device checks with Cardiologist.   Sincerely,  Flavio Gonzalez MD

## 2023-08-06 NOTE — CARDIOLOGY SUMMARY
[de-identified] : 4/12/2021 LHC: LM normal. LAD normal. Cx normal. mRCA 70% small vessel, medical \par management. RRA access. Resumed Xarelto. \par CORONARY VESSELS: The coronary circulation is left dominant.\par LM:   --  LM: Normal.\par LAD:   --  LAD: Angiography showed minor luminal irregularities with no\par flow limiting lesions.\par --  D1: Angiography showed minor luminal irregularities with no flow\par limiting lesions.\par CX:   --  Circumflex: Normal.\par --  OM1: Normal.\par --  OM2: Normal.\par RCA:   --  Proximal RCA: Angiography showed minor luminal irregularities\par with no flow limiting lesions.\par --  Mid RCA: There was a diffuse 70 % stenosis.\par --  Distal RCA: Angiography showed minor luminal irregularities with no\par flow limiting lesions.\par COMPLICATIONS: There were no complications. No complications occurred\par during the cath lab visit.\par DIAGNOSTIC RECOMMENDATIONS: Coronary angiogram demonstrates atherosclerosis\par in very small sized RCA. Will provide medical management of small vessel\par CAD at this time. [de-identified] : 4/8/2021: CONCLUSIONS:\par Technically difficult study.\par 1. Mitral annular calcification, otherwise normal mitral\par valve. Minimal mitral regurgitation.\par 2. Aortic valve leaflet morphology not well visualized.\par Mild aortic regurgitation.\par 3. Endocardium not well visualized; grossly normal left\par ventricular systolic function.  Endocardial visualization\par enhanced with intravenous injection of echo contrast\par (Definity).\par 4. Unable to accurately evaluate right ventricular size or\par systolic function.  A device wire is noted in the right\par heart.

## 2023-08-07 ENCOUNTER — OUTPATIENT (OUTPATIENT)
Dept: OUTPATIENT SERVICES | Facility: HOSPITAL | Age: 73
LOS: 1 days | Discharge: HOME | End: 2023-08-07
Payer: MEDICARE

## 2023-08-07 DIAGNOSIS — I49.5 SICK SINUS SYNDROME: ICD-10-CM

## 2023-08-07 DIAGNOSIS — Z95.0 PRESENCE OF CARDIAC PACEMAKER: Chronic | ICD-10-CM

## 2023-08-07 LAB — RH IG SCN BLD-IMP: POSITIVE — SIGNIFICANT CHANGE UP

## 2023-08-07 PROCEDURE — 93657 TX L/R ATRIAL FIB ADDL: CPT

## 2023-08-07 PROCEDURE — 93010 ELECTROCARDIOGRAM REPORT: CPT | Mod: 77

## 2023-08-07 PROCEDURE — 93656 COMPRE EP EVAL ABLTJ ATR FIB: CPT

## 2023-08-07 PROCEDURE — 93010 ELECTROCARDIOGRAM REPORT: CPT

## 2023-08-07 RX ORDER — APIXABAN 2.5 MG/1
1 TABLET, FILM COATED ORAL
Refills: 0 | DISCHARGE

## 2023-08-07 RX ORDER — METOPROLOL TARTRATE 50 MG
1 TABLET ORAL
Refills: 0 | DISCHARGE

## 2023-08-07 RX ORDER — PANTOPRAZOLE SODIUM 20 MG/1
1 TABLET, DELAYED RELEASE ORAL
Qty: 30 | Refills: 0
Start: 2023-08-07 | End: 2023-09-05

## 2023-08-07 NOTE — CHART NOTE - NSCHARTNOTEFT_GEN_A_CORE
Interventional Recovery Suite Pre-Procedure PA Note    In brief, this is a 72 year old male with a PMHx of sinus node dysfunction s/p PPM placement (Medtronic), atrial fibrillation/flutter on Eliquis, HTN, HLD, GERD, and mild PURVI (s/p weight loss not on CPAP) who presents for elective atrial fibrillation/flutter ablation. H&P from PST reviewed. Medication reconciliation edited/updated where appropriate. Last dose of Eliquis was 8/6/2023 at 22:00. Last PO intake was 8/6/2023 at 18:00. No dentures or loose teeth. EKG reviewed. Procedure explained in detail. Risks/benefits discussed. All questions answered. Consent obtained.

## 2023-08-08 ENCOUNTER — NON-APPOINTMENT (OUTPATIENT)
Age: 73
End: 2023-08-08

## 2023-09-08 ENCOUNTER — APPOINTMENT (OUTPATIENT)
Dept: ELECTROPHYSIOLOGY | Facility: CLINIC | Age: 73
End: 2023-09-08
Payer: MEDICARE

## 2023-09-08 ENCOUNTER — NON-APPOINTMENT (OUTPATIENT)
Age: 73
End: 2023-09-08

## 2023-09-08 VITALS
SYSTOLIC BLOOD PRESSURE: 124 MMHG | HEART RATE: 60 BPM | OXYGEN SATURATION: 99 % | HEIGHT: 68 IN | BODY MASS INDEX: 33.23 KG/M2 | WEIGHT: 219.25 LBS | DIASTOLIC BLOOD PRESSURE: 69 MMHG

## 2023-09-08 DIAGNOSIS — G47.33 OBSTRUCTIVE SLEEP APNEA (ADULT) (PEDIATRIC): ICD-10-CM

## 2023-09-08 PROBLEM — Z86.79 PERSONAL HISTORY OF OTHER DISEASES OF THE CIRCULATORY SYSTEM: Chronic | Status: ACTIVE | Noted: 2023-07-25

## 2023-09-08 PROCEDURE — 99213 OFFICE O/P EST LOW 20 MIN: CPT

## 2023-09-08 PROCEDURE — 93000 ELECTROCARDIOGRAM COMPLETE: CPT

## 2023-09-08 NOTE — DISCUSSION/SUMMARY
[EKG obtained to assist in diagnosis and management of assessed problem(s)] : EKG obtained to assist in diagnosis and management of assessed problem(s) [FreeTextEntry1] : Impression:  1. Paroxysmal afib: now s/p PVI, left atrial flutter, and CTI ablation on 8/7/2023. EKG performed today to assess for presence of recurrent afib and reveals NSR with apc. Quick check of PPM reveals no afib since ablation. Remains on Eliquis without s/s of bleeding.   2. HTN: resume oral antihypertensives as prescribed. Encouraged heart healthy diet, sodium restriction, and weight loss. Continue regular f/u with Cardiologist for further HTN management.  3. HLD: resume statin therapy as prescribed and regular f/u with Cardiologist for routine lipid monitoring and management.  3. SND: s/p dual chamber PPM placement. Resume routine device checks as scheduled.   Continue f/u with Cardiologist and RTO for f/u in 3 months.

## 2023-09-23 ENCOUNTER — RX RENEWAL (OUTPATIENT)
Age: 73
End: 2023-09-23

## 2023-12-08 ENCOUNTER — NON-APPOINTMENT (OUTPATIENT)
Age: 73
End: 2023-12-08

## 2023-12-08 ENCOUNTER — APPOINTMENT (OUTPATIENT)
Dept: ELECTROPHYSIOLOGY | Facility: CLINIC | Age: 73
End: 2023-12-08
Payer: MEDICARE

## 2023-12-08 VITALS
OXYGEN SATURATION: 96 % | SYSTOLIC BLOOD PRESSURE: 137 MMHG | HEIGHT: 68 IN | WEIGHT: 219 LBS | DIASTOLIC BLOOD PRESSURE: 81 MMHG | HEART RATE: 81 BPM | BODY MASS INDEX: 33.19 KG/M2 | TEMPERATURE: 97 F

## 2023-12-08 DIAGNOSIS — I48.0 PAROXYSMAL ATRIAL FIBRILLATION: ICD-10-CM

## 2023-12-08 PROCEDURE — 93000 ELECTROCARDIOGRAM COMPLETE: CPT | Mod: 59

## 2023-12-08 PROCEDURE — 93280 PM DEVICE PROGR EVAL DUAL: CPT

## 2023-12-08 PROCEDURE — 99213 OFFICE O/P EST LOW 20 MIN: CPT

## 2023-12-12 ENCOUNTER — NON-APPOINTMENT (OUTPATIENT)
Age: 73
End: 2023-12-12

## 2024-03-11 ENCOUNTER — APPOINTMENT (OUTPATIENT)
Dept: ELECTROPHYSIOLOGY | Facility: CLINIC | Age: 74
End: 2024-03-11

## 2024-03-11 NOTE — PHYSICAL EXAM
[Well Developed] : well developed [No Acute Distress] : no acute distress [Well Nourished] : well nourished [Normal Conjunctiva] : normal conjunctiva [Normal Venous Pressure] : normal venous pressure [No Carotid Bruit] : no carotid bruit [Normal] : normal S1, S2, no murmur, no rub, no gallop [Normal S1, S2] : normal S1, S2 [No Murmur] : no murmur [No Rub] : no rub [No Gallop] : no gallop [Clear Lung Fields] : clear lung fields [No Respiratory Distress] : no respiratory distress  [Soft] : abdomen soft [Good Air Entry] : good air entry [No Masses/organomegaly] : no masses/organomegaly [Non Tender] : non-tender [Normal Bowel Sounds] : normal bowel sounds [No Edema] : no edema [Normal Gait] : normal gait [No Clubbing] : no clubbing [No Cyanosis] : no cyanosis [No Varicosities] : no varicosities [No Rash] : no rash [No Focal Deficits] : no focal deficits [No Skin Lesions] : no skin lesions [Moves all extremities] : moves all extremities [Normal Speech] : normal speech [Alert and Oriented] : alert and oriented [Normal memory] : normal memory

## 2024-03-12 ENCOUNTER — APPOINTMENT (OUTPATIENT)
Dept: ELECTROPHYSIOLOGY | Facility: CLINIC | Age: 74
End: 2024-03-12
Payer: MEDICARE

## 2024-03-12 ENCOUNTER — NON-APPOINTMENT (OUTPATIENT)
Age: 74
End: 2024-03-12

## 2024-03-12 VITALS — SYSTOLIC BLOOD PRESSURE: 148 MMHG | DIASTOLIC BLOOD PRESSURE: 90 MMHG

## 2024-03-12 VITALS
HEIGHT: 68 IN | SYSTOLIC BLOOD PRESSURE: 162 MMHG | HEART RATE: 84 BPM | WEIGHT: 247 LBS | DIASTOLIC BLOOD PRESSURE: 93 MMHG | OXYGEN SATURATION: 97 % | BODY MASS INDEX: 37.44 KG/M2

## 2024-03-12 DIAGNOSIS — Z78.9 OTHER SPECIFIED HEALTH STATUS: ICD-10-CM

## 2024-03-12 DIAGNOSIS — I48.92 UNSPECIFIED ATRIAL FLUTTER: ICD-10-CM

## 2024-03-12 DIAGNOSIS — I48.91 UNSPECIFIED ATRIAL FIBRILLATION: ICD-10-CM

## 2024-03-12 DIAGNOSIS — I10 ESSENTIAL (PRIMARY) HYPERTENSION: ICD-10-CM

## 2024-03-12 DIAGNOSIS — E78.5 HYPERLIPIDEMIA, UNSPECIFIED: ICD-10-CM

## 2024-03-12 DIAGNOSIS — I49.5 SICK SINUS SYNDROME: ICD-10-CM

## 2024-03-12 DIAGNOSIS — Z98.890 OTHER SPECIFIED POSTPROCEDURAL STATES: ICD-10-CM

## 2024-03-12 DIAGNOSIS — Z95.0 PRESENCE OF CARDIAC PACEMAKER: ICD-10-CM

## 2024-03-12 DIAGNOSIS — Z86.79 OTHER SPECIFIED POSTPROCEDURAL STATES: ICD-10-CM

## 2024-03-12 PROCEDURE — 99213 OFFICE O/P EST LOW 20 MIN: CPT

## 2024-03-12 PROCEDURE — 93000 ELECTROCARDIOGRAM COMPLETE: CPT

## 2024-03-12 RX ORDER — METOPROLOL SUCCINATE 25 MG/1
25 TABLET, EXTENDED RELEASE ORAL
Refills: 0 | Status: DISCONTINUED | COMMUNITY
Start: 2023-06-13 | End: 2024-03-12

## 2024-03-12 NOTE — CARDIOLOGY SUMMARY
[de-identified] : 4/8/2021: CONCLUSIONS:  Technically difficult study.  1. Mitral annular calcification, otherwise normal mitral  valve. Minimal mitral regurgitation.  2. Aortic valve leaflet morphology not well visualized.  Mild aortic regurgitation.  3. Endocardium not well visualized; grossly normal left  ventricular systolic function. Endocardial visualization  enhanced with intravenous injection of echo contrast  (Definity).  4. Unable to accurately evaluate right ventricular size or  systolic function. A device wire is noted in the right  heart.   [de-identified] : 4/12/2021 LHC: LM normal. LAD normal. Cx normal. mRCA 70% small vessel, medical  management. RRA access. Resumed Xarelto.  CORONARY VESSELS: The coronary circulation is left dominant.  LM: -- LM: Normal.  LAD: -- LAD: Angiography showed minor luminal irregularities with no  flow limiting lesions.  -- D1: Angiography showed minor luminal irregularities with no flow  limiting lesions.  CX: -- Circumflex: Normal.  -- OM1: Normal.  -- OM2: Normal.  RCA: -- Proximal RCA: Angiography showed minor luminal irregularities  with no flow limiting lesions.  -- Mid RCA: There was a diffuse 70 % stenosis.  -- Distal RCA: Angiography showed minor luminal irregularities with no  flow limiting lesions.  COMPLICATIONS: There were no complications. No complications occurred  during the cath lab visit.  DIAGNOSTIC RECOMMENDATIONS: Coronary angiogram demonstrates atherosclerosis  in very small sized RCA. Will provide medical management of small vessel  CAD at this time.

## 2024-03-12 NOTE — DISCUSSION/SUMMARY
[FreeTextEntry1] : Impression:  1. Paroxysmal afib: now s/p PVI, left atrial flutter, and CTI ablation on 8/7/2023. EKG performed today to assess for presence of recurrent afib and reveals NSR. Quick check of PPM reveals no afib since ablation. He stopped metoprolol. Will remain on Eliquis for stroke prevention. He will monitor his arrhythmia on his Apple Watch. Will f/u in 3 months. If he is comfortable with his Apple Watch, then we can stop Eliquis. Remote monitoring of his pacemaker requires a wand, does not provide wireless communication and cannot be relied upon to detect AF in a timely manner. His [id number] score is 2. BMI 37.5 kg/m2. Advised lifestyle modification and do diet and exercise to control weight management, Avoid alcohol and caffeine.   2. HTN: resume oral antihypertensives as prescribed. Encouraged heart healthy diet, sodium restriction, and weight loss. Continue regular f/u with Cardiologist for further HTN management.  3. HLD: resume statin therapy as prescribed and regular f/u with Cardiologist for routine lipid monitoring and management.  3. SND: s/p dual chamber PPM placement. Resume routine device checks as scheduled.   Continue f/u with Cardiologist and RTO for f/u in 6 months. [EKG obtained to assist in diagnosis and management of assessed problem(s)] : EKG obtained to assist in diagnosis and management of assessed problem(s)

## 2024-03-12 NOTE — HISTORY OF PRESENT ILLNESS
[FreeTextEntry1] : Eder Cruz is a 72y/o man with Hx of HTN, HLD, GERD, obesity, sinus node dysfunction s/p dual chamber PPM (Medtronic) placement, and COVID+ PNA and atrial fibrillation, was on anticoagulation, and s/p PVI, left atrial flutter, and CTI ablation on 8/7/2023 who presents today for routine f/u.  Remains on Eliquis without s/s of bleeding. Was discontinued metoprolol during last visit. He has been free of significant arrhythmias. PPM interrogation today showed he has had no significant arrhythmias, just a brief non sustained episode of PAT. He would like to stop Eliquis as he is feeling well and has no recurrent AFIB.  [id number] Score 2. He gained about 28 lbs since the last visit due to holiday seasons and trying to lose weight, Feeling well now.  Denies chest pain, palpitations, shortness of breath, dyspnea on exertion, syncope, light headedness or dizziness.

## 2024-04-23 ENCOUNTER — APPOINTMENT (OUTPATIENT)
Dept: NEUROLOGY | Facility: CLINIC | Age: 74
End: 2024-04-23

## 2024-09-19 ENCOUNTER — NON-APPOINTMENT (OUTPATIENT)
Age: 74
End: 2024-09-19

## 2024-09-20 ENCOUNTER — NON-APPOINTMENT (OUTPATIENT)
Age: 74
End: 2024-09-20

## 2024-09-20 ENCOUNTER — APPOINTMENT (OUTPATIENT)
Dept: ELECTROPHYSIOLOGY | Facility: CLINIC | Age: 74
End: 2024-09-20

## 2024-09-20 VITALS
RESPIRATION RATE: 16 BRPM | WEIGHT: 260 LBS | OXYGEN SATURATION: 97 % | BODY MASS INDEX: 39.53 KG/M2 | TEMPERATURE: 97.6 F | HEART RATE: 84 BPM

## 2024-09-20 VITALS — DIASTOLIC BLOOD PRESSURE: 90 MMHG | SYSTOLIC BLOOD PRESSURE: 151 MMHG

## 2024-09-20 DIAGNOSIS — Z98.890 OTHER SPECIFIED POSTPROCEDURAL STATES: ICD-10-CM

## 2024-09-20 DIAGNOSIS — E78.5 HYPERLIPIDEMIA, UNSPECIFIED: ICD-10-CM

## 2024-09-20 DIAGNOSIS — I10 ESSENTIAL (PRIMARY) HYPERTENSION: ICD-10-CM

## 2024-09-20 DIAGNOSIS — I49.5 SICK SINUS SYNDROME: ICD-10-CM

## 2024-09-20 DIAGNOSIS — Z86.79 OTHER SPECIFIED POSTPROCEDURAL STATES: ICD-10-CM

## 2024-09-20 DIAGNOSIS — Z95.0 PRESENCE OF CARDIAC PACEMAKER: ICD-10-CM

## 2024-09-20 DIAGNOSIS — I48.0 PAROXYSMAL ATRIAL FIBRILLATION: ICD-10-CM

## 2024-09-20 PROCEDURE — 93000 ELECTROCARDIOGRAM COMPLETE: CPT | Mod: 59

## 2024-09-20 PROCEDURE — 93280 PM DEVICE PROGR EVAL DUAL: CPT

## 2024-09-20 PROCEDURE — 99214 OFFICE O/P EST MOD 30 MIN: CPT

## 2024-09-20 PROCEDURE — G2211 COMPLEX E/M VISIT ADD ON: CPT

## 2024-09-20 NOTE — DISCUSSION/SUMMARY
[FreeTextEntry1] : Impression:  1. Paroxysmal afib: now s/p PVI, left atrial flutter, and CTI ablation on 8/7/2023. EKG performed today to assess for presence of recurrent afib and reveals NSR. Quick check of PPM reveals episode of afib lasting 8 hours earlier this month, occurred during sleep. He was not wearing his Apple Iwatch as he charges it during sleep. Recommend restarting Eliquis for thromboembolic prophylaxis but patient refusing. Will start wearing his Apple Iwatch to bed and will also establish remote monitoring for continued afib surveillance. Verbalizes understanding of thromboembolic risk without use of AC.   2. HTN: BP elevated today, not on medication. States he gained 40 lbs and now actively trying to lose weight. Encouraged heart healthy diet, sodium restriction, and weight loss. Continue regular f/u with Cardiologist for further HTN management.  3. HLD: resume statin therapy as prescribed and regular f/u with Cardiologist for routine lipid monitoring and management.  Continue f/u with Cardiologist and RTO for f/u in 6 months. [EKG obtained to assist in diagnosis and management of assessed problem(s)] : EKG obtained to assist in diagnosis and management of assessed problem(s)

## 2024-09-20 NOTE — HISTORY OF PRESENT ILLNESS
[FreeTextEntry1] : Eder Cruz is a 74y/o man with Hx of HTN, HLD, GERD, obesity, sinus node dysfunction s/p dual chamber PPM (Medtronic) placement, and COVID+ PNA and atrial fibrillation, s/p PVI, left atrial flutter, and CTI ablation on 8/7/2023 who presents today for routine f/u. Has been doing well. Denies chest pain, palpitations, SOB, syncope or near syncope. Now off ELiquis. Wearing his Apple Iwatch without evidence of afib.

## 2024-09-20 NOTE — CARDIOLOGY SUMMARY
[de-identified] : 4/8/2021: CONCLUSIONS:  Technically difficult study.  1. Mitral annular calcification, otherwise normal mitral  valve. Minimal mitral regurgitation.  2. Aortic valve leaflet morphology not well visualized.  Mild aortic regurgitation.  3. Endocardium not well visualized; grossly normal left  ventricular systolic function. Endocardial visualization  enhanced with intravenous injection of echo contrast  (Definity).  4. Unable to accurately evaluate right ventricular size or  systolic function. A device wire is noted in the right  heart.   [de-identified] : 4/12/2021 LHC: LM normal. LAD normal. Cx normal. mRCA 70% small vessel, medical  management. RRA access. Resumed Xarelto.  CORONARY VESSELS: The coronary circulation is left dominant.  LM: -- LM: Normal.  LAD: -- LAD: Angiography showed minor luminal irregularities with no  flow limiting lesions.  -- D1: Angiography showed minor luminal irregularities with no flow  limiting lesions.  CX: -- Circumflex: Normal.  -- OM1: Normal.  -- OM2: Normal.  RCA: -- Proximal RCA: Angiography showed minor luminal irregularities  with no flow limiting lesions.  -- Mid RCA: There was a diffuse 70 % stenosis.  -- Distal RCA: Angiography showed minor luminal irregularities with no  flow limiting lesions.  COMPLICATIONS: There were no complications. No complications occurred  during the cath lab visit.  DIAGNOSTIC RECOMMENDATIONS: Coronary angiogram demonstrates atherosclerosis  in very small sized RCA. Will provide medical management of small vessel  CAD at this time.

## 2024-09-20 NOTE — HISTORY OF PRESENT ILLNESS
[FreeTextEntry1] : Eder Cruz is a 72y/o man with Hx of HTN, HLD, GERD, obesity, sinus node dysfunction s/p dual chamber PPM (Medtronic) placement, and COVID+ PNA and atrial fibrillation, s/p PVI, left atrial flutter, and CTI ablation on 8/7/2023 who presents today for routine f/u. Has been doing well. Denies chest pain, palpitations, SOB, syncope or near syncope. Now off ELiquis. Wearing his Apple Iwatch without evidence of afib.

## 2024-09-20 NOTE — CARDIOLOGY SUMMARY
[de-identified] : 4/8/2021: CONCLUSIONS:  Technically difficult study.  1. Mitral annular calcification, otherwise normal mitral  valve. Minimal mitral regurgitation.  2. Aortic valve leaflet morphology not well visualized.  Mild aortic regurgitation.  3. Endocardium not well visualized; grossly normal left  ventricular systolic function. Endocardial visualization  enhanced with intravenous injection of echo contrast  (Definity).  4. Unable to accurately evaluate right ventricular size or  systolic function. A device wire is noted in the right  heart.   [de-identified] : 4/12/2021 LHC: LM normal. LAD normal. Cx normal. mRCA 70% small vessel, medical  management. RRA access. Resumed Xarelto.  CORONARY VESSELS: The coronary circulation is left dominant.  LM: -- LM: Normal.  LAD: -- LAD: Angiography showed minor luminal irregularities with no  flow limiting lesions.  -- D1: Angiography showed minor luminal irregularities with no flow  limiting lesions.  CX: -- Circumflex: Normal.  -- OM1: Normal.  -- OM2: Normal.  RCA: -- Proximal RCA: Angiography showed minor luminal irregularities  with no flow limiting lesions.  -- Mid RCA: There was a diffuse 70 % stenosis.  -- Distal RCA: Angiography showed minor luminal irregularities with no  flow limiting lesions.  COMPLICATIONS: There were no complications. No complications occurred  during the cath lab visit.  DIAGNOSTIC RECOMMENDATIONS: Coronary angiogram demonstrates atherosclerosis  in very small sized RCA. Will provide medical management of small vessel  CAD at this time.

## 2024-11-13 ENCOUNTER — NON-APPOINTMENT (OUTPATIENT)
Age: 74
End: 2024-11-13

## 2024-12-04 NOTE — CONSULT NOTE ADULT - ASSESSMENT
[facility]  [facility] Neurology Progress Note    Godwin Fonseca Patient Status:  Observation    1976 MRN [id number]   Location [facility] 7NE-A Attending Jhonny Rebolledo MD   Hosp Day # 0 PCP Adrian Small MD     CC: BLE weakness    Subjective: Patient was seen and assessed for follow up visit today, sleeping in bed, easily aroused to voice and alert oriented. Pt remains with BLE weakness and intermittent spasms starting from hips and radiating down the leg. Pt rates BLE pain 7/10 at this time, able to move freely. Pt reports to have similar episode in 2018 when he was told he had cauda equina.  Denies s/s of bowel incontinence, numbness/tingling/paresthesia anywhere on the body, no reports of nausea, vomiting, chest pain or sob. No new focal neurological deficit noted.         MEDICATIONS:  No current outpatient medications on file.     Current Facility-Administered Medications   Medication Dose Route Frequency    ondansetron (Zofran) 4 MG/2ML injection 4 mg  4 mg Intravenous Q6H PRN    ondansetron (Zofran-ODT) disintegrating tab 4 mg  4 mg Oral Q6H PRN    carvedilol (Coreg) tab 6.25 mg  6.25 mg Oral BID with meals    sevelamer carbonate (Renvela) tab 2,400 mg  2,400 mg Oral TID CC    acetaminophen (Tylenol Extra Strength) tab 500 mg  500 mg Oral Q6H PRN    acetaminophen (Tylenol Extra Strength) tab 1,000 mg  1,000 mg Oral Q6H PRN    sodium chloride 0.9 % IV bolus 100 mL  100 mL Intravenous Q30 Min PRN    And    albumin human (Albumin) 25% injection 25 g  25 g Intravenous PRN Dialysis    albuterol (Ventolin HFA) 108 (90 Base) MCG/ACT inhaler 2 puff  2 puff Inhalation Q6H PRN    buPROPion ER (Wellbutrin XL) 24 hr tab 150 mg  150 mg Oral Daily    fenofibrate micronized (Lofibra) cap 134 mg  134 mg Oral Nightly    FLUoxetine (PROzac) cap 40 mg  40 mg Oral Daily    fluticasone propionate (Flonase) 50 MCG/ACT nasal suspension 1 spray  1 spray Each Nare Daily    lamoTRIgine (LaMICtal) tab 100 mg  100 mg  Oral Daily    memantine (Namenda) tab 5 mg  5 mg Oral BID    tamsulosin (Flomax) cap 0.4 mg  0.4 mg Oral Daily    heparin (Porcine) 5000 UNIT/ML injection 5,000 Units  5,000 Units Subcutaneous Q8H MARTHA    acetaminophen (Tylenol) tab 650 mg  650 mg Oral Q4H PRN    ARIPiprazole (Abilify) tab 15 mg  15 mg Oral Daily       REVIEW OF SYSTEMS:  A 10-point system was reviewed.  Pertinent positives and negatives are noted in HPI.      PHYSICAL EXAMINATION:  VITAL SIGNS: /69 (BP Location: Right arm)   Pulse 102   Temp 97.8 °F (36.6 °C) (Oral)   Resp 18   Ht 74\"   Wt 236 lb 6.4 oz (107.2 kg)   SpO2 97%   BMI 30.35 kg/m²   GENERAL:  Patient is a 48 year old male in no acute distress.  HEENT:  Normocephalic, atraumatic  ABD: Soft, non tender  SKIN: Warm, dry, no rashes    NEUROLOGICAL:   Mental status: Oriented to person, place, situation and time   Speech: Fluent, no obvious aphasia or dysarthria  Memory and comprehension: Intact   Cranial Nerves: VFF, PERRL 3mm brisk, EOMI, no nystagmus, facial sensation intact, face symmetric, tongue midline, shoulder shrug equal, remainder CN intact  Motor: No drift, no focal arm weakness bilaterally, BLE weakness and motor exam 4/5, DTRs 1/4 throughout    Sensory: Intact to light touch bilaterally  Coordination: finger-to-nose test normal  Gait: Deferred      Imaging/Diagnostics:  XR CHEST AP PORTABLE  (CPT=71045)    Result Date: 12/2/2024  CONCLUSION:  See above.   LOCATION:  [id number]      Dictated by (CST): Smith Randle MD on 12/02/2024 at 7:46 PM     Finalized by (CST): Smith Randle MD on 12/02/2024 at 7:48 PM          Labs:  Recent Labs   Lab 12/01/24  0210 12/02/24  0544 12/03/24  0558 12/04/24  0644   RBC 2.72* 3.09* 3.12* 3.80*   HGB 8.9* 9.6* 9.7* 11.9*   HCT 25.3* 28.0* 28.5* 34.9*   MCV 93.0 90.6 91.3 91.8   MCH 32.7 31.1 31.1 31.3   MCHC 35.2 34.3 34.0 34.1   RDW 13.9 13.6 13.4 13.3   NEPRELIM 4.08 3.18  --  2.23   WBC 6.8 4.6 5.3 5.4   .0 230.0 255.0 314.0          Recent Labs   Lab 12/02/24  0544 12/03/24  0558 12/04/24  0644   *  209* 102* 87   BUN 41*  41* 61* 44*   CREATSERUM 7.87*  7.87* 9.68* 8.40*   EGFRCR 8*  8* 6* 7*   CA 8.8  8.8 9.0 10.2     136 137 138   K 4.0  4.0 4.4 4.8   CL 98  98 100 94*   CO2 26.0  26.0 25.0 30.0       Pre-morbid mRS 1-2      Assessment and Plan:    Weakness - pt presented with BLE weakness, thus missed his HD, episode of nose bleed. Differential include neurogenic (eg spinal stenosis, peripheral nerve disorder etc) vs toxic/metabolic/cardiogenic.   - Considering pt hx of spinal stenosis - MRI cervical/thoracic/lumbar ordered for further eval and pending.  - If negative may consider csf analysis with LP and/or outpt emg for further evaluation.   2. ESRD  - pt on HD, Nephrology following.  3. Troponemia and Hypotension - Cardiology following pt.    4. Discussed plan of care with pt at bed side with positive verbal understanding. All questions answered, case discussed with nursing and Dr. Weiss. Will continue to follow pt.     Is this a shared or split note between Advanced Practice Provider and Physician? No     PILAR Toribio  [facility]  12/4/2024, 10:17 AM  [place] # [id number]     70yoM w/ PMHx bradycardia s/p PPM, recent admission for COVID-19 at [place] during which admission was found to have afib and SVT and was started on amiodarone and xarelto.  Patient presents during this admission with lightheadedness and underwent a [facility] for an abnormal stress test which showed mRCA 70% being medically managed.  PPM device interrogated and noted to only have afib episodes during March while admitted for COVID treatment, no episodes noted since.  Patient states he is feeling well and is ready to go home;  not currently requiring supplemental O2.     #PAF   -Continue amiodarone 200mg PO daily for 30 days post discharge  -Patient to follow up with Dr Flavio Gonzalez within 4 weeks after discharge

## 2024-12-20 ENCOUNTER — APPOINTMENT (OUTPATIENT)
Dept: ELECTROPHYSIOLOGY | Facility: CLINIC | Age: 74
End: 2024-12-20

## 2025-01-29 ENCOUNTER — APPOINTMENT (OUTPATIENT)
Dept: ELECTROPHYSIOLOGY | Facility: CLINIC | Age: 75
End: 2025-01-29
Payer: MEDICARE

## 2025-01-29 ENCOUNTER — NON-APPOINTMENT (OUTPATIENT)
Age: 75
End: 2025-01-29

## 2025-01-29 PROCEDURE — 93296 REM INTERROG EVL PM/IDS: CPT

## 2025-01-29 PROCEDURE — 93294 REM INTERROG EVL PM/LDLS PM: CPT

## 2025-03-07 ENCOUNTER — OUTPATIENT (OUTPATIENT)
Dept: OUTPATIENT SERVICES | Facility: HOSPITAL | Age: 75
LOS: 1 days | End: 2025-03-07
Payer: MEDICARE

## 2025-03-07 ENCOUNTER — RESULT REVIEW (OUTPATIENT)
Age: 75
End: 2025-03-07

## 2025-03-07 DIAGNOSIS — Z00.00 ENCOUNTER FOR GENERAL ADULT MEDICAL EXAMINATION WITHOUT ABNORMAL FINDINGS: ICD-10-CM

## 2025-03-07 DIAGNOSIS — M76.72 PERONEAL TENDINITIS, LEFT LEG: ICD-10-CM

## 2025-03-07 DIAGNOSIS — Z95.0 PRESENCE OF CARDIAC PACEMAKER: Chronic | ICD-10-CM

## 2025-03-07 PROCEDURE — 93971 EXTREMITY STUDY: CPT

## 2025-03-07 PROCEDURE — 93971 EXTREMITY STUDY: CPT | Mod: 26,LT

## 2025-03-18 ENCOUNTER — APPOINTMENT (OUTPATIENT)
Dept: ELECTROPHYSIOLOGY | Facility: CLINIC | Age: 75
End: 2025-03-18
Payer: MEDICARE

## 2025-03-18 ENCOUNTER — NON-APPOINTMENT (OUTPATIENT)
Age: 75
End: 2025-03-18

## 2025-03-18 VITALS
BODY MASS INDEX: 39.4 KG/M2 | WEIGHT: 260 LBS | HEART RATE: 86 BPM | OXYGEN SATURATION: 97 % | SYSTOLIC BLOOD PRESSURE: 166 MMHG | TEMPERATURE: 98.2 F | DIASTOLIC BLOOD PRESSURE: 104 MMHG | HEIGHT: 68 IN

## 2025-03-18 VITALS — DIASTOLIC BLOOD PRESSURE: 92 MMHG | SYSTOLIC BLOOD PRESSURE: 154 MMHG

## 2025-03-18 DIAGNOSIS — E78.5 HYPERLIPIDEMIA, UNSPECIFIED: ICD-10-CM

## 2025-03-18 DIAGNOSIS — I49.5 SICK SINUS SYNDROME: ICD-10-CM

## 2025-03-18 DIAGNOSIS — I48.91 UNSPECIFIED ATRIAL FIBRILLATION: ICD-10-CM

## 2025-03-18 DIAGNOSIS — I10 ESSENTIAL (PRIMARY) HYPERTENSION: ICD-10-CM

## 2025-03-18 DIAGNOSIS — Z95.0 PRESENCE OF CARDIAC PACEMAKER: ICD-10-CM

## 2025-03-18 PROCEDURE — 99213 OFFICE O/P EST LOW 20 MIN: CPT

## 2025-03-18 PROCEDURE — 93000 ELECTROCARDIOGRAM COMPLETE: CPT

## 2025-04-30 ENCOUNTER — NON-APPOINTMENT (OUTPATIENT)
Age: 75
End: 2025-04-30

## 2025-04-30 ENCOUNTER — APPOINTMENT (OUTPATIENT)
Dept: ELECTROPHYSIOLOGY | Facility: CLINIC | Age: 75
End: 2025-04-30
Payer: MEDICARE

## 2025-04-30 PROCEDURE — 93294 REM INTERROG EVL PM/LDLS PM: CPT

## 2025-04-30 PROCEDURE — 93296 REM INTERROG EVL PM/IDS: CPT

## 2025-07-30 ENCOUNTER — APPOINTMENT (OUTPATIENT)
Dept: ELECTROPHYSIOLOGY | Facility: CLINIC | Age: 75
End: 2025-07-30
Payer: MEDICARE

## 2025-07-30 ENCOUNTER — NON-APPOINTMENT (OUTPATIENT)
Age: 75
End: 2025-07-30

## 2025-07-30 PROCEDURE — 93296 REM INTERROG EVL PM/IDS: CPT

## 2025-07-30 PROCEDURE — 93294 REM INTERROG EVL PM/LDLS PM: CPT

## 2025-08-22 ENCOUNTER — EMERGENCY (EMERGENCY)
Facility: HOSPITAL | Age: 75
LOS: 1 days | End: 2025-08-22
Attending: EMERGENCY MEDICINE | Admitting: EMERGENCY MEDICINE
Payer: MEDICARE

## 2025-08-22 VITALS
HEIGHT: 69 IN | RESPIRATION RATE: 18 BRPM | SYSTOLIC BLOOD PRESSURE: 171 MMHG | HEART RATE: 86 BPM | DIASTOLIC BLOOD PRESSURE: 89 MMHG | TEMPERATURE: 98 F | OXYGEN SATURATION: 99 % | WEIGHT: 270.07 LBS

## 2025-08-22 DIAGNOSIS — Z95.0 PRESENCE OF CARDIAC PACEMAKER: Chronic | ICD-10-CM

## 2025-08-22 PROCEDURE — 26770 TREAT FINGER DISLOCATION: CPT | Mod: 54,F4

## 2025-08-22 PROCEDURE — 99284 EMERGENCY DEPT VISIT MOD MDM: CPT | Mod: 57

## 2025-08-22 PROCEDURE — 73140 X-RAY EXAM OF FINGER(S): CPT

## 2025-08-22 PROCEDURE — 73562 X-RAY EXAM OF KNEE 3: CPT | Mod: 26,RT

## 2025-08-22 PROCEDURE — 73140 X-RAY EXAM OF FINGER(S): CPT | Mod: 26,LT

## 2025-08-22 PROCEDURE — 73562 X-RAY EXAM OF KNEE 3: CPT

## 2025-08-22 RX ORDER — ACETAMINOPHEN 500 MG/5ML
975 LIQUID (ML) ORAL ONCE
Refills: 0 | Status: COMPLETED | OUTPATIENT
Start: 2025-08-22 | End: 2025-08-22

## 2025-08-22 RX ORDER — BACITRACIN 500 UNIT/G
1 OINTMENT (GRAM) TOPICAL ONCE
Refills: 0 | Status: COMPLETED | OUTPATIENT
Start: 2025-08-22 | End: 2025-08-22

## 2025-08-23 VITALS
SYSTOLIC BLOOD PRESSURE: 152 MMHG | HEART RATE: 82 BPM | TEMPERATURE: 99 F | RESPIRATION RATE: 18 BRPM | DIASTOLIC BLOOD PRESSURE: 85 MMHG | OXYGEN SATURATION: 99 %

## 2025-08-23 PROCEDURE — 26770 TREAT FINGER DISLOCATION: CPT | Mod: F4

## 2025-08-23 PROCEDURE — 73140 X-RAY EXAM OF FINGER(S): CPT | Mod: 26,LT

## 2025-08-23 PROCEDURE — 73140 X-RAY EXAM OF FINGER(S): CPT

## 2025-08-23 PROCEDURE — 73562 X-RAY EXAM OF KNEE 3: CPT

## 2025-08-23 PROCEDURE — 99284 EMERGENCY DEPT VISIT MOD MDM: CPT | Mod: 25

## 2025-08-23 RX ADMIN — Medication 1 APPLICATION(S): at 01:13

## 2025-08-23 RX ADMIN — Medication 975 MILLIGRAM(S): at 01:13

## 2025-09-16 ENCOUNTER — APPOINTMENT (OUTPATIENT)
Dept: ELECTROPHYSIOLOGY | Facility: CLINIC | Age: 75
End: 2025-09-16